# Patient Record
Sex: FEMALE
[De-identification: names, ages, dates, MRNs, and addresses within clinical notes are randomized per-mention and may not be internally consistent; named-entity substitution may affect disease eponyms.]

---

## 2018-04-08 ENCOUNTER — HOSPITAL ENCOUNTER (INPATIENT)
Dept: HOSPITAL 42 - ED | Age: 63
LOS: 2 days | Discharge: HOME | DRG: 738 | End: 2018-04-10
Attending: INTERNAL MEDICINE | Admitting: INTERNAL MEDICINE
Payer: MEDICARE

## 2018-04-08 DIAGNOSIS — E66.9: ICD-10-CM

## 2018-04-08 DIAGNOSIS — C56.2: Primary | ICD-10-CM

## 2018-04-08 DIAGNOSIS — E11.65: ICD-10-CM

## 2018-04-08 DIAGNOSIS — K80.20: ICD-10-CM

## 2018-04-08 DIAGNOSIS — H54.62: ICD-10-CM

## 2018-04-08 DIAGNOSIS — H81.09: ICD-10-CM

## 2018-04-08 DIAGNOSIS — Z88.0: ICD-10-CM

## 2018-04-08 DIAGNOSIS — I10: ICD-10-CM

## 2018-04-08 LAB
ALBUMIN SERPL-MCNC: 4.2 G/DL (ref 3–4.8)
ALBUMIN/GLOB SERPL: 1.1 {RATIO} (ref 1.1–1.8)
ALT SERPL-CCNC: 32 U/L (ref 7–56)
AMORPH SED URNS QL MICRO: (no result)
AMYLASE SERPL-CCNC: 60 U/L (ref 35–125)
APPEARANCE UR: CLEAR
APTT BLD: 26.9 SECONDS (ref 25.1–36.5)
AST SERPL-CCNC: 33 U/L (ref 14–36)
BACTERIA #/AREA URNS HPF: (no result) /[HPF]
BASE EXCESS BLDV CALC-SCNC: 0.6 MMOL/L (ref 0–2)
BASOPHILS # BLD AUTO: 0.02 K/MM3 (ref 0–2)
BASOPHILS NFR BLD: 0.1 % (ref 0–3)
BILIRUB UR-MCNC: NEGATIVE MG/DL
BUN SERPL-MCNC: 17 MG/DL (ref 7–21)
CALCIUM SERPL-MCNC: 10 MG/DL (ref 8.4–10.5)
COLOR UR: YELLOW
EOSINOPHIL # BLD: 0 10*3/UL (ref 0–0.7)
EOSINOPHIL NFR BLD: 0.2 % (ref 1.5–5)
ERYTHROCYTE [DISTWIDTH] IN BLOOD BY AUTOMATED COUNT: 13.3 % (ref 11.5–14.5)
GFR NON-AFRICAN AMERICAN: > 60
GLUCOSE UR STRIP-MCNC: NEGATIVE MG/DL
GRANULOCYTES # BLD: 17.48 10*3/UL (ref 1.4–6.5)
GRANULOCYTES NFR BLD: 90.9 % (ref 50–68)
HGB BLD-MCNC: 12.6 G/DL (ref 12–16)
INR PPP: 1.09 (ref 0.93–1.08)
LEUKOCYTE ESTERASE UR-ACNC: NEGATIVE LEU/UL
LIPASE SERPL-CCNC: 57 U/L (ref 23–300)
LYMPHOCYTE: 4 % (ref 22–35)
LYMPHOCYTES # BLD: 0.9 10*3/UL (ref 1.2–3.4)
LYMPHOCYTES NFR BLD AUTO: 4.5 % (ref 22–35)
MCH RBC QN AUTO: 30.7 PG (ref 25–35)
MCHC RBC AUTO-ENTMCNC: 33.9 G/DL (ref 31–37)
MCV RBC AUTO: 90.7 FL (ref 80–105)
MONOCYTE: 1 % (ref 1–6)
MONOCYTES # BLD AUTO: 0.8 10*3/UL (ref 0.1–0.6)
MONOCYTES NFR BLD: 4.3 % (ref 1–6)
NEUTROPHILS NFR BLD AUTO: 92 % (ref 50–70)
NEUTS BAND NFR BLD: 3 % (ref 0–2)
PH BLDV: 7.36 [PH] (ref 7.32–7.43)
PH UR STRIP: 5.5 [PH] (ref 4.7–8)
PLATELET # BLD EST: (no result) 10*3/UL
PLATELET # BLD: 450 10^3/UL (ref 120–450)
PMV BLD AUTO: 9.6 FL (ref 7–11)
PROT UR STRIP-MCNC: (no result) MG/DL
PROTHROMBIN TIME: 12.6 SECONDS (ref 9.4–12.5)
RBC # BLD AUTO: 4.1 10^6/UL (ref 3.5–6.1)
RBC # UR STRIP: NEGATIVE /UL
RBC #/AREA URNS HPF: (no result) /HPF (ref 0–2)
SP GR UR STRIP: >= 1.03 (ref 1–1.03)
TROPONIN I SERPL-MCNC: < 0.01 NG/ML
UROBILINOGEN UR STRIP-ACNC: 0.2 E.U./DL
VENOUS BLOOD FIO2: 21 %
VENOUS BLOOD GAS PCO2: 47 (ref 40–60)
VENOUS BLOOD GAS PO2: 23 MM/HG (ref 30–55)
WBC # BLD AUTO: 19.2 10^3/UL (ref 4.5–11)

## 2018-04-08 RX ADMIN — METRONIDAZOLE SCH MLS/HR: 500 INJECTION, SOLUTION INTRAVENOUS at 21:42

## 2018-04-08 NOTE — HP
DATE OF EXAM:  04/08/2018



HISTORY OF PRESENT ILLNESS:  Ms. Adams is a 62-year-old female presented

to the ED with left lower quadrant pain and lower abdominal pain.  She also

had nausea and vomiting.  CAT scan of the abdomen and pelvis done showed

large pelvic mass around 11 cm with cystic and solid component concerning

for ovarian cancer.  Denies any bleeding from any site.  No chest pain.  No

shortness of breath.  No weight loss.



PAST MEDICAL HISTORY:  Hypertension, Meniere disease, diabetes mellitus

type 2, history of peritonitis.



PAST SURGICAL HISTORY:  Rotator cuff surgery.



FAMILY HISTORY:  Noncontributory.



PERSONAL HISTORY:  Never smoked.  No history of alcohol abuse.



ALLERGIES:  PENICILLIN, TYLENOL, ASPIRIN, OXYCODONE.



REVIEW OF SYSTEMS:  As per HPI.  Rest of 12-point systems reviewed and

negative.



PHYSICAL EXAMINATION:

GENERAL:  Mild distress due to abdominal pain.

VITAL SIGNS:  Temperature 98, heart rate 94 per minute, respiratory rate 18

per minute, blood pressure 166/90, pulse ox is 98% on room air.

HEENT:  Normal.

NECK:  No lymphadenopathy.

CHEST:  Air entry present and equal bilateral.  No added sound.

CARDIOVASCULAR:  S1 and S2 normal.  No murmur.  No gallop.

ABDOMEN:  Soft, nontender.  No hepatosplenomegaly.

EXTREMITIES:  No edema.

ABDOMEN:  No rebound tenderness in lower abdomen.

SPINE:  Nontender.

SKIN:  No petechiae.  No rash.



LABORATORY DATA:  White count 19.2, hemoglobin 12.6, hematocrit 37.2,

platelet 450.  Sodium 138, potassium 4.1, BUN 17, creatinine 0.9, glucose

184.



ASSESSMENT:

1.  Pelvic mass.

     Concerning for ovarian cancer.

3.  Hypertension.

4.  Diabetes mellitus type 2.



PLAN:  Tumor marker C-125, CA 19-9 , CEA will be sent.  Ultrasound of the

abdomen.  If possible biopsy of the solid component for diagnosis.  If biopsy

 not possible because of  cystic components, Diagnostic ascitic tap can be

done for cytology for diagnosis.  We will consult Dr. Arya Plok.  Surgery

consultation with Dr. Mcgrath requested.  No evidence of acute abdomen on

CAT scan.  UA and urine culture pending.  Levaquin and IV Flagyl was

started.  IV fluid normal saline at 80 mL an hour.  Pain control with

morphine 1 mg q.4 hour p.r.n.

Gynec Oncology consultation will be arranged upon discharge form hospital. 





__________________________________________

Dang Matthews MD



DD:  04/08/2018 17:49:08

DT:  04/08/2018 17:54:31

[facility] # [id number]

TAHIR

## 2018-04-08 NOTE — CP.PCM.PN
Subjective





- Date & Time of Evaluation


Date of Evaluation: 04/08/18


Time of Evaluation: 21:50





- Subjective


Subjective: 


Patient was seen at bedside.


 She received morphine 2 mg IV which I ordered earlier for pain.


 She still has abdominal pain, lower abdominal pain , sharp pain, no radiation 

of pain.(21:50)


 Has no other complaints.


 Medical record was reviewed.


 This 62 year old white woman is admitted with LLQ abdominal pain of i week 

duration, nausea, vomiting, fever, chills,leukocytosis, free fluid


 and complex ovarian mass on CT scan.


 Has PMH of HTN, DM, obesity, peritonitis,left eye blindness, Meniere's disease

, right rotator cuff surgery, allergy to PCN, ASA, acetaminophen, 


 oxycodone.





Objective





- Vital Signs/Intake and Output


Vital Signs (last 24 hours): 


 











Temp Pulse Resp BP Pulse Ox


 


 98.0 F   90   18   120/83   97 


 


 04/08/18 15:04  04/08/18 16:12  04/08/18 16:12  04/08/18 16:12  04/08/18 16:12











- Medications


Medications: 


 Current Medications





Hydromorphone HCl (Dilaudid)  2 mg IVP STAT STA


   Stop: 04/08/18 21:50


Metronidazole (Flagyl)  500 mg in 100 mls @ 100 mls/hr IVPB Q8 SANDY


   PRN Reason: Protocol


   Last Admin: 04/08/18 21:42 Dose:  100 mls/hr


Sodium Chloride (Sodium Chloride 0.9%)  1,000 mls @ 80 mls/hr IV .[id number] SANDY


   Last Admin: 04/08/18 18:03 Dose:  80 mls/hr


Levofloxacin/Dextrose (Levaquin 750mg)  750 mg IVPB DAILY SANDY


   PRN Reason: Protocol


Morphine Sulfate (Morphine)  1 mg IVP Q4H PRN


   PRN Reason: Pain, moderate (4-7)


   Last Admin: 04/08/18 18:16 Dose:  1 mg











- Labs


Labs: 


 











PT  12.6 SECONDS (9.4-12.5)  H  04/08/18  12:20    


 


INR  1.09  (0.93-1.08)  H  04/08/18  12:20    


 


APTT  26.9 Seconds (25.1-36.5)   04/08/18  12:20    








 Most Recent Lab Values











WBC  19.2 10^3/ul (4.5-11.0)  H  04/08/18  12:20    


 


RBC  4.10 10^6/uL (3.5-6.1)   04/08/18  12:20    


 


Hgb  12.6 g/dL (12.0-16.0)   04/08/18  12:20    


 


Hct  37.2 % (36.0-48.0)   04/08/18  12:20    


 


MCV  90.7 fl (80.0-105.0)   04/08/18  12:20    


 


MCH  30.7 pg (25.0-35.0)   04/08/18  12:20    


 


MCHC  33.9 g/dl (31.0-37.0)   04/08/18  12:20    


 


RDW  13.3 % (11.5-14.5)   04/08/18  12:20    


 


Plt Count  450 10^3/uL (120.0-450.0)   04/08/18  12:20    


 


MPV  9.6 fl (7.0-11.0)   04/08/18  12:20    


 


Gran %  90.9 % (50.0-68.0)  H  04/08/18  12:20    


 


Lymph % (Auto)  4.5 % (22.0-35.0)  L  04/08/18  12:20    


 


Mono % (Auto)  4.3 % (1.0-6.0)   04/08/18  12:20    


 


Eos % (Auto)  0.2 % (1.5-5.0)  L  04/08/18  12:20    


 


Baso % (Auto)  0.1 % (0.0-3.0)   04/08/18  12:20    


 


Gran #  17.48  (1.4-6.5)  H  04/08/18  12:20    


 


Lymph # (Auto)  0.9  (1.2-3.4)  L  04/08/18  12:20    


 


Mono # (Auto)  0.8  (0.1-0.6)  H  04/08/18  12:20    


 


Eos # (Auto)  0.0  (0.0-0.7)   04/08/18  12:20    


 


Baso # (Auto)  0.02 K/mm3 (0.0-2.0)   04/08/18  12:20    


 


Neutrophils % (Manual)  92 % (50.0-70.0)  H  04/08/18  12:20    


 


Band Neutrophils %  3 % (0-2)  H  04/08/18  12:20    


 


Lymphocytes % (Manual)  4 % (22.0-35.0)  L  04/08/18  12:20    


 


Monocytes % (Manual)  1 % (1.0-6.0)   04/08/18  12:20    


 


Platelet Evaluation  High  (NORMAL)   04/08/18  12:20    


 


PT  12.6 SECONDS (9.4-12.5)  H  04/08/18  12:20    


 


INR  1.09  (0.93-1.08)  H  04/08/18  12:20    


 


APTT  26.9 Seconds (25.1-36.5)   04/08/18  12:20    


 


pO2  23 mm/Hg (30-55)  L  04/08/18  12:20    


 


VBG pH  7.36  (7.32-7.43)   04/08/18  12:20    


 


VBG pCO2  47.0  (40-60)   04/08/18  12:20    


 


VBG HCO3  26.6 mmol/l (21-28)   04/08/18  12:20    


 


VBG Total CO2  28.0 mmol.L (22-28)   04/08/18  12:20    


 


VBG O2 Sat (Calc)  45.1 % (40-65)   04/08/18  12:20    


 


VBG Base Excess  0.6 mmol/L (0.0-2.0)   04/08/18  12:20    


 


VBG Potassium  4.4 mmol/L (3.6-5.2)   04/08/18  12:20    


 


Sodium  135.0 mmol/L (132-148)   04/08/18  12:20    


 


Chloride  101.0 mmol/L ()   04/08/18  12:20    


 


Glucose  179 mg/dl ()  H  04/08/18  12:20    


 


Lactate  1.3 mmol/L (0.7-2.1)   04/08/18  12:20    


 


FiO2  21.0 %  04/08/18  12:20    


 


Sodium  138 mmol/L (132-148)   04/08/18  12:20    


 


Potassium  4.1 mmol/L (3.6-5.0)   04/08/18  12:20    


 


Chloride  101 mmol/L ()   04/08/18  12:20    


 


Carbon Dioxide  25 mmol/L (21-33)   04/08/18  12:20    


 


Anion Gap  16  (10-20)   04/08/18  12:20    


 


BUN  17 mg/dL (7-21)   04/08/18  12:20    


 


Creatinine  0.9 mg/dl (0.7-1.2)   04/08/18  12:20    


 


Est GFR ( Amer)  > 60   04/08/18  12:20    


 


Est GFR (Non-Af Amer)  > 60   04/08/18  12:20    


 


POC Glucose (mg/dL)  152 mg/dL ()  H  04/08/18  14:45    


 


Random Glucose  184 mg/dL ()  H  04/08/18  12:20    


 


Calcium  10.0 mg/dL (8.4-10.5)   04/08/18  12:20    


 


Phosphorus  3.4 mg/dL (2.5-4.5)   04/08/18  12:20    


 


Magnesium  1.8 mg/dL (1.7-2.2)   04/08/18  12:20    


 


Total Bilirubin  0.5 mg/dL (0.2-1.3)   04/08/18  12:20    


 


AST  33 U/L (14-36)   04/08/18  12:20    


 


ALT  32 U/L (7-56)   04/08/18  12:20    


 


Alkaline Phosphatase  85 U/L ()   04/08/18  12:20    


 


Lactate Dehydrogenase  799 U/L (333-699)  H  04/08/18  12:20    


 


Total Creatine Kinase  36 U/L ()   04/08/18  12:20    


 


Troponin I  < 0.01 ng/mL  04/08/18  12:20    


 


Total Protein  8.0 g/dL (5.8-8.3)   04/08/18  12:20    


 


Albumin  4.2 g/dL (3.0-4.8)   04/08/18  12:20    


 


Globulin  3.8 gm/dL  04/08/18  12:20    


 


Albumin/Globulin Ratio  1.1  (1.1-1.8)   04/08/18  12:20    


 


Amylase  60 U/L ()   04/08/18  12:20    


 


Lipase  57 U/L ()   04/08/18  12:20    


 


Venous Blood Potassium  4.4 mmol/L (3.6-5.2)   04/08/18  12:20    


 


Urine Color  Yellow  (YELLOW)   04/08/18  15:35    


 


Urine Appearance  Clear  (CLEAR)   04/08/18  15:35    


 


Urine pH  5.5  (4.7-8.0)   04/08/18  15:35    


 


Ur Specific Gravity  >= 1.030  (1.005-1.035)   04/08/18  15:35    


 


Urine Protein  Trace mg/dL (<30 mg/dL)  H  04/08/18  15:35    


 


Urine Glucose (UA)  Negative mg/dL (NEGATIVE)   04/08/18  15:35    


 


Urine Ketones  15 mg/dL (NEGATIVE)  H  04/08/18  15:35    


 


Urine Blood  Negative  (NEGATIVE)   04/08/18  15:35    


 


Urine Nitrate  Negative  (NEGATIVE)   04/08/18  15:35    


 


Urine Bilirubin  Negative  (NEGATIVE)   04/08/18  15:35    


 


Urine Urobilinogen  0.2 E.U./dL (<1 E.U./dL)   04/08/18  15:35    


 


Ur Leukocyte Esterase  Negative Dennis/uL (NEGATIVE)   04/08/18  15:35    


 


Urine RBC  0 - 2 /hpf (0-2)   04/08/18  15:35    


 


Urine WBC  1 - 3 /hpf (0-6)   04/08/18  15:35    


 


Ur Epithelial Cells  4 - 5 /hpf (0-5)   04/08/18  15:35    


 


Amorphous Sediment  Few   04/08/18  15:35    


 


Urine Bacteria  Many  (NEG)   04/08/18  15:35    


 


Urine Other  Uyeast   04/08/18  15:35    


 


Blood Type  AB POSITIVE   04/08/18  12:20    


 


Blood Type Confirm  AB POSITIVE   04/08/18  13:00    


 


Antibody Screen  Negative   04/08/18  12:20    


 


BBK History Checked  No verified bt   04/08/18  12:20    














- Constitutional


Appears: Well, No Acute Distress





- Head Exam


Head Exam: ATRAUMATIC, NORMAL INSPECTION, NORMOCEPHALIC





- Eye Exam


Eye Exam: Normal appearance





- ENT Exam


ENT Exam: Normal External Ear Exam





- Neck Exam


Neck Exam: Normal Inspection





- Respiratory Exam


Respiratory Exam: NORMAL BREATHING PATTERN





- Cardiovascular Exam


Cardiovascular Exam: absent: JVD





- GI/Abdominal Exam


GI & Abdominal Exam: Soft, Tenderness (Mild suprapubic and lower abdominal 

tenderness present.).  absent: Distended, Firm, Guarding, Rigid





- Rectal Exam


Rectal Exam: Deferred





-  Exam


Additional comments: 





Deferred.





- Extremities Exam


Extremities Exam: Normal Inspection





- Back Exam


Back Exam: NORMAL INSPECTION





- Neurological Exam


Neurological Exam: Alert, Awake, Oriented x3





- Psychiatric Exam


Psychiatric exam: Normal Affect, Normal Mood





- Skin


Skin Exam: Normal Color





Assessment and Plan





- Assessment and Plan (Free Text)


Assessment: 





Lower abdominal pain.


Ovarian mass.


Nausea/Vomiting.


Leukocytosis.


Hyperglycemia.


Fever/Chills.


Obesity.


DM II. 


HTN.





Plan: 





Morphine 2 mg IV given.


Dilaudid 2 mg IV given.


Continue present management.

## 2018-04-08 NOTE — CP.PCM.CON
History of Present Illness





- History of Present Illness


History of Present Illness: 





General Surgery:  Dr Mcgrath





Pt is a 62F with PMH of HTN and DM.  Pt presents to ED with 1 week of LLQ 

abdominal pain, which has acutely worsened over the past 24 hours.  Pt states 

it was a dull ache for most of the week until last night when it became a sharp 

pain, with associated nausea and vomiting.  Pt states she has been feeling 

feverish and has been having chills for the past 3 days.  Admits to having 

normal bowel movements throughout the past week.  Denies any sob, chest pain, 

hematemsis or hematochezia.  Pt has never had this pain before, though states 

30 years ago she was told she had "peritonitis".  





PMH: HTN. DM


PSH: denies


ALL: penicillins, aspirin





Review of Systems





- Review of Systems


All systems: reviewed and no additional remarkable complaints except (as per hpi

)





Past Patient History





- Past Social History


Smoking Status: Never Smoked





- CARDIAC


Hx Cardiac Disorders: Yes


Hx Hypertension: Yes





- PULMONARY


Hx Respiratory Disorders: No





- NEUROLOGICAL


Hx Neurological Disorder: No





- HEENT


Hx HEENT Problems: Yes


Hx Blind: Yes (left eye)


Other/Comment: Meniere's Disease





- RENAL


Hx Chronic Kidney Disease: No





- ENDOCRINE/METABOLIC


Hx Endocrine Disorders: Yes


Hx Diabetes Mellitus Type 2: Yes





- HEMATOLOGICAL/ONCOLOGICAL


Hx Blood Disorders: No





- INTEGUMENTARY


Hx Dermatological Problems: No





- MUSCULOSKELETAL/RHEUMATOLOGICAL


Hx Musculoskeletal Disorders: No





- GASTROINTESTINAL


Hx Gastrointestinal Disorders: Yes


Other/Comment: Peritonitis





- GENITOURINARY/GYNECOLOGICAL


Hx Genitourinary Disorders: No





- PSYCHIATRIC


Hx Psychophysiologic Disorder: No


Hx Substance Use: No





- SURGICAL HISTORY


Hx Orthopedic Surgery: Yes (right rotator cuff)





Meds


Allergies/Adverse Reactions: 


 Allergies











Allergy/AdvReac Type Severity Reaction Status Date / Time


 


Penicillins Allergy  URTICARIA Verified 04/08/18 12:44


 


acetaminophen [From Percocet] AdvReac  DIZZINESS Verified 04/08/18 12:45


 


aspirin AdvReac  PAIN Verified 04/08/18 12:45


 


oxycodone AdvReac  DIZZINESS Verified 04/08/18 12:45














Physical Exam





- Constitutional


Appears: In Acute Distress





- Head Exam


Head Exam: NORMOCEPHALIC





- Eye Exam


Eye Exam: Normal appearance





- ENT Exam


ENT Exam: Mucous Membranes Dry





- Respiratory Exam


Respiratory Exam: absent: Accessory Muscle Use, Respiratory Distress





- Cardiovascular Exam


Cardiovascular Exam: Tachycardia, REGULAR RHYTHM





- GI/Abdominal Exam


GI & Abdominal Exam: Firm, Guarding (voluntary), Tenderness (worse in LLQ).  

absent: Distended, Hernia, Mass, Pulsatile Mass, Rebound, Rigid





- Extremities Exam


Extremities exam: Negative for: pedal edema





- Neurological Exam


Neurological exam: Alert, Oriented x3





- Psychiatric Exam


Psychiatric exam: Anxious, Normal Affect





- Skin


Skin Exam: Normal Color, Warm





Results





- Vital Signs


Recent Vital Signs: 


 Last Vital Signs











Temp  98.0 F   04/08/18 14:41


 


Pulse  94 H  04/08/18 14:41


 


Resp  18   04/08/18 14:41


 


BP  166/91 H  04/08/18 14:41


 


Pulse Ox  95   04/08/18 14:41














- Labs


Result Diagrams: 


 04/08/18 12:20





 04/08/18 12:20


Labs: 


 Laboratory Results - last 24 hr











  04/08/18





  14:45


 


POC Glucose (mg/dL)  152 H














Assessment & Plan





- Assessment and Plan (Free Text)


Assessment: 





62F with 1 week of abdominal pain; CT shows free fluid and complex ovarian mass


Plan: 





symptoms likely related to ovarian pathology and not diverticulitis


would appropriately resuscitate and initiate IV abx


consider Gyn consult/work-up








will d/w Dr Mcgrath


further recs to follow





Maryam, PGY3

## 2018-04-08 NOTE — ED PDOC
Arrival/HPI





- General


Chief Complaint: GI Problem


Time Seen by Provider: 04/08/18 12:07


Historian: Patient





- History of Present Illness


Narrative History of Present Illness (Text): 


04/08/18 12:28


62 year old female, whose past medical history includes diabetes and 

hypertension, who presents to the emergency department complaining of abdominal 

pain for approximately one week. Patient states pain started gradually in left 

lower abdomen and has been constant and progressively worse. Also reports 

sensation of icnreased distension.  States she has pain with urination, but 

denies discoloration of urine, denies retension. Patient notes associated 

vomiting and nausea. Reports normal bowel movements, denies bloody or dark 

stool. States she has chills in the middle of the night for the "past few days".


04/08/18 12:54





Symptom Onset: Sudden


Symptom Course: Unchanged


Activities at Onset: Light


Context: Home





Past Medical History





- Provider Review


Nursing Documentation Reviewed: Yes





- Cardiac


Hx Cardiac Disorders: Yes


Hx Hypertension: Yes





- Pulmonary


Hx Respiratory Disorders: No





- Neurological


Hx Neurological Disorder: No





- HEENT


Hx HEENT Disorder: Yes


Hx Blind: Yes (left eye)


Other/Comment: Meniere's Disease





- Renal


Hx Renal Disorder: No





- Endocrine/Metabolic


Hx Endocrine Disorders: Yes


Hx Diabetes Mellitus Type 2: Yes





- Hematological/Oncological


Hx Blood Disorders: No





- Integumentary


Hx Dermatological Disorder: No





- Musculoskeletal/Rheumatological


Hx Musculoskeletal Disorders: No





- Gastrointestinal


Hx Gastrointestinal Disorders: Yes


Other/Comment: Peritonitis





- Genitourinary/Gynecological


Hx Genitourinary Disorders: No





- Psychiatric


Hx Psychophysiologic Disorder: No


Hx Substance Use: No





- Surgical History


Hx Orthopedic Surgery: Yes (right rotator cuff)





Family/Social History





- Physician Review


Nursing Documentation Reviewed: Yes


Family/Social History: Unknown Family HX


Smoking Status: Never Smoked


Hx Alcohol Use: No


Hx Substance Use: No





Allergies/Home Meds


Allergies/Adverse Reactions: 


Allergies





Penicillins Allergy (Verified 04/08/18 12:44)


 URTICARIA


acetaminophen [From Percocet] Adverse Reaction (Verified 04/08/18 12:45)


 DIZZINESS


aspirin Adverse Reaction (Verified 04/08/18 12:45)


 PAIN


oxycodone Adverse Reaction (Verified 04/08/18 12:45)


 DIZZINESS











Review of Systems





- Review of Systems


Constitutional: Fatigue, Fevers, Night Sweats


Eyes: absent: Vision Changes


ENT: absent: Hearing Changes, Sore Throat


Respiratory: SOB.  absent: Cough


Cardiovascular: absent: Chest Pain, Palpitations, Edema, Calf Pain, RIGGINS


Gastrointestinal: Abdominal Pain, Nausea, Vomiting, Appetite Changes, Anorexia


Genitourinary Female: Dysuria.  absent: Frequency, Vaginal Bleeding, Vaginal 

Discharge


Musculoskeletal: absent: Back Pain, Neck Pain


Skin: absent: Rash


Neurological: absent: Headache, Dizziness, Focal Weakness


Endocrine: absent: Polyuria


Hemo/Lymphatic: absent: Easy Bleeding


Psychiatric: absent: Depression





Physical Exam





- Physical Exam


Narrative Physical Exam (Text): 


04/08/18 12:32


Head:  Atraumatic.  Normocephalic. 


Eyes:  Left eye at baseline from prior disease. No pain with eye movements.


ENT:  Mucous membranes dry.  Oropharynx is clear and symmetric. 


Neck:  Supple.  Full ROM.  No JVD.  No lymphadenopathy. No meningeal signs.


Cardiovascular:  Tachycardiac.  No murmurs, rubs, or gallops.  Distal pulses 

are 2+ and symmetric.


Pulmonary/Chest:  Slightly tachypneic. Clear to auscultation bilaterally.  No 

wheezing, rales or rhonchi.


Abdominal:  Distended.  Rebound and guarding to the left lower quadrant.  No 

pulsatile masses. Strong femoral pulses. No incarcerated hernias palpated.


Back:  No CVA tenderness.


Extremities:  No edema.   No cyanosis.  No clubbing.  Full range of motion in 

all extremities.  No calf tenderness.


Skin:  Skin is warm and dry.  No petechiae.  No purpura. 


Neurological:  Alert, awake, and oriented to person, place, time, and 

situation.  Normal speech. Motor and sensory exam intact.


Psychiatric:  Good eye contact.  Normal interaction, affect, and behavior.











Vital Signs Reviewed: Yes


Vital Signs











  Temp Pulse Resp BP Pulse Ox


 


 04/08/18 16:12   90  18  120/83  97


 


 04/08/18 15:04  98.0 F  94 H  18  166/91 H 


 


 04/08/18 14:41  98.0 F  94 H  18  166/91 H  95


 


 04/08/18 13:58   83  18  144/78  99


 


 04/08/18 13:25   96 H  18  158/90 H  98


 


 04/08/18 13:18   92 H  18  167/92 H  99


 


 04/08/18 12:55   78  18  170/90 H  99


 


 04/08/18 12:40  99 F  90  18  165/92 H  99


 


 04/08/18 12:19   126 H  18  160/122 H  100











Temperature: Afebrile


Blood Pressure: Hypertensive


Pulse: Tachycardic


Respiratory Rate: Tachypneic


Appearance: Positive for: Ill-Appearing, Uncomfortable


Pain Distress: Severe


Mental Status: Positive for: Alert and Oriented X 3





Medical Decision Making


ED Course and Treatment: 


04/08/18 12:34


Impression:


62 year old female who presents to the emergency department complaining of left 

lower abdominal pain for 1 week. 





Differential Diagnosis included but are not limited to:  Diverticulitis vs. 

perforation vs. peritonitis vs. bowel obstruction vs. renal colic vs. 

pyleonephritis vs. uti vs. sepsis





Plan:


-- IV Hydration


-- CT Abdomen


-- IV Abx


-- Surgery Consult


-- Reassess and disposition





Progress Notes:


04/08/18 12:20


Case discussed with surgical resident, who is aware and agrees with plan. 





04/08/18 12:39


After lengthy discussion with patient, pt states percocet and oxycodon give her 

"stroke like symptoms". Pt also allergic to Penicillin and aspirin and tylenol. 

Due to these allergies, pt will be started on Levaquin and flagyl for suspicion 

of intraabdominal infection, and will have lengthy discussion regarding pain 

medication alternatives for patient with risks/benefits. 





Surgical resident has performed initial evaluation in the Emergency Department.


Dr. Matthews, covering for PMD, has also performed initial assessment and requests 

Dr. Mcgrath for surgical consult, who I have communicated exam and history with.


Labs and imaging studies pending.





04/08/18 13:01


Based on CT findings, consult was placed with on call gynecologist, Dr. Mary. 








04/08/18 15:08


Patient DENIES allergy to acetaminophen. Tylenol ordered. She reports allergy 

to Percocet by has taken tylenol without adverse effect.


I reviewed CT findings with patient.


CT suggests possible pelvic mass with ascites. Given leukocytosis I cannot 

exclude infectious component as well although no free air or abscess currently 

noted. She is afebrile. NOT hypotensive. Tachycardia improved.


04/08/18 15:13


Dr Matthews states she will consult gynecolgy oncolgist, requests cancelling 

current gynecology consult until further assessment.


Pain management and allergies reviewed with patient and admitting team.








- Lab Interpretations


Lab Results: 








 04/08/18 12:20 





 04/08/18 12:20 





 Lab Results





04/08/18 13:00: Blood Type Confirm AB POSITIVE


04/08/18 12:20: Blood Type AB POSITIVE, Antibody Screen Negative, BBK History 

Checked No verified bt


04/08/18 12:20: Sodium 138, Chloride 101, Potassium 4.1, Carbon Dioxide 25, 

Anion Gap 16, BUN 17, Creatinine 0.9, Est GFR (African Amer) > 60, Est GFR (Non-

Af Amer) > 60, Random Glucose 184 H, Calcium 10.0, Phosphorus 3.4, Magnesium 1.8

, Total Bilirubin 0.5, AST 33, ALT 32, Alkaline Phosphatase 85, Lactate 

Dehydrogenase 799 H, Total Creatine Kinase 36, Troponin I < 0.01, Total Protein 

8.0, Albumin 4.2, Globulin 3.8, Albumin/Globulin Ratio 1.1, Amylase 60, Lipase 

57


04/08/18 12:20: pO2 23 L, VBG pH 7.36, VBG pCO2 47.0, VBG HCO3 26.6, VBG Total 

CO2 28.0, VBG O2 Sat (Calc) 45.1, VBG Base Excess 0.6, VBG Potassium 4.4, 

Sodium 135.0, Chloride 101.0, Glucose 179 H, Lactate 1.3, FiO2 21.0, Venous 

Blood Potassium 4.4


04/08/18 12:20: PT 12.6 H, INR 1.09 H, APTT 26.9


04/08/18 12:20: WBC 19.2 H, RBC 4.10, Hgb 12.6, Hct 37.2, MCV 90.7, MCH 30.7, 

MCHC 33.9, RDW 13.3, Plt Count 450, MPV 9.6, Gran % 90.9 H, Lymph % (Auto) 4.5 L

, Mono % (Auto) 4.3, Eos % (Auto) 0.2 L, Baso % (Auto) 0.1, Gran # 17.48 H, 

Lymph # (Auto) 0.9 L, Mono # (Auto) 0.8 H, Eos # (Auto) 0.0, Baso # (Auto) 0.02

, Neutrophils % (Manual) 92 H, Band Neutrophils % 3 H, Lymphocytes % (Manual) 4 

L, Monocytes % (Manual) 1, Platelet Evaluation High











- RAD Interpretation


Radiology Orders: 








04/08/18 12:25


ABD & PELVIS W/O PO OR IV CONT [CT] Stat 





04/08/18 12:26


CHEST PORTABLE [RAD] Stat 





04/08/18 12:31


ABDOMEN PORTABLE 1 VIEW [RAD] Stat 











: Radiologist





- EKG Interpretation


Interpreted by ED Physician: Yes


Type: 12 lead EKG





- Medication Orders


Current Medication Orders: 








Metronidazole (Flagyl)  500 mg in 100 mls @ 100 mls/hr IVPB Q8 SANDY


   PRN Reason: Protocol


   Last Admin: 04/08/18 21:42  Dose: 100 mls/hr





eMAR Start Stop


 Document     04/08/18 21:42  GIOVANNA  (Rec: 04/08/18 21:43  GIOVANNA SOSAKOSTENDORFLP)


     Intravenous Solution


      Start Date                                 04/08/18


      Start Time                                 21:42


      End Date                                   04/08/18


      End time                                   22:42


      Total Infusion Time                        60





Sodium Chloride (Sodium Chloride 0.9%)  1,000 mls @ 80 mls/hr IV .[id number] [facility]


   Last Admin: 04/08/18 18:03  Dose: 80 mls/hr





eMAR Start Stop


 Document     04/08/18 18:03  SOPHIE  (Rec: 04/08/18 18:12  SOPHIE MIRANDASTENDORFLP)


     Intravenous Solution


      Start Date                                 04/08/18


      Start Time                                 18:12





Levofloxacin/Dextrose (Levaquin 750mg)  750 mg IVPB DAILY SANDY


   PRN Reason: Protocol


Morphine Sulfate (Morphine)  1 mg IVP Q4H PRN


   PRN Reason: Pain, moderate (4-7)


   Last Admin: 04/08/18 18:16  Dose: 1 mg





MAR Pain Assessment


 Document     04/08/18 18:16  SOPHIE  (Rec: 04/08/18 18:16  SOPHIE SOSAKOSTENDORFLP)


     Pain Reassessment


      Is this a pain reassessment?               Yes


     Sleep


      Is patient sleeping during reassessment?   No


     Presence of Pain


      Presence of Pain                           Yes


     Pain Scale Used


      Pain Scale Used                            Numeric


     Location


      Left, Right or Bilateral                   Left


      Upper or Lower                             Lower


      Pain Location Body Site                    Abdomen


     Description


      Description                                Intermittent


      Intensity of Pain at present               10


      Pain Behavior                              Moaning


      Aggravating Factors                        ADL's


                                                 Changing Position


      Alleviating Factors/Management             Medication


       Techniques                                


      Alleviating Factors                        Medication


IVP Administration


 Document     04/08/18 18:16  SOPHIE  (Rec: 04/08/18 18:16  SOPHIE SOSAKOSTENDORFLP)


     Charges for Administration


      # of IVP Administrations                   1


Re-Assess: MAR Pain Assessment


 Document     04/08/18 19:16  SOPHIE  (Rec: 04/08/18 19:44  SOPHIE  [id number])


     Pain Reassessment


      Is this a pain reassessment?               Yes


     Sleep


      Is patient sleeping during reassessment?   No


     Presence of Pain


      Presence of Pain                           No





Pneumococcal Polyvalent Vaccine (Pneumovax 23 Vaccine)  0.5 ml IM .ONCE ONE


   Stop: 04/08/18 23:19





Discontinued Medications





Acetaminophen (Tylenol 325mg Tab)  650 mg PO ONCE STA


   Stop: 04/08/18 14:51


   Last Admin: 04/08/18 15:26  Dose: 650 mg





MAR Pain/Vitals


 Document     04/08/18 15:26  FRANCIS  (Rec: 04/08/18 15:26  [id number]


     Pain Reassessment


      Is This A Pain ReAssessment?               No


     Sleep


      Is patient sleeping during reassessment?   No


     Presence of Pain


      Presence of Pain                           Yes





Hydromorphone HCl (Dilaudid)  0.5 mg IVP Q3H PRN


   PRN Reason: Pain, moderate (4-7)


Hydromorphone HCl (Dilaudid)  2 mg IVP STAT STA


   Stop: 04/08/18 21:50


   Last Admin: 04/08/18 22:10  Dose: 2 mg





MAR Pain Assessment


 Document     04/08/18 22:10  GIOVANNA  (Rec: 04/08/18 22:10  GIOVANNA  

[facility]


     Pain Reassessment


      Is this a pain reassessment?               Yes


     Presence of Pain


      Presence of Pain                           Yes


     Pain Scale Used


      Pain Scale Used                            Numeric


     Location


      Pain Location Body Site                    Abdomen


     Description


      Description                                Constant


      Intensity of Pain at present               8


IVP Administration


 Document     04/08/18 22:10  GIOVANNA  (Rec: 04/08/18 22:10  GIOVANNA  

[facility]


     Charges for Administration


      # of IVP Administrations                   1


Re-Assess: MAR Pain Assessment


 Document     04/08/18 23:10  GIOVANNA  (Rec: 04/08/18 23:11  GIOVANNA  [id number])


     Pain Reassessment


      Is this a pain reassessment?               Yes


     Sleep


      Is patient sleeping during reassessment?   Yes





Lactated Ringer's 3,030 ml/ IV (SUPPLIES)  3,030 mls @ 6,069.06 mls/hr IV ONCE 

ONE


   PRN Reason: 60 ML/KG/HR


   Stop: 04/08/18 12:29


   Last Admin: 04/08/18 12:59  Dose: 6,069.06 mls/hr





eMAR Start Stop


 Document     04/08/18 12:59  FRANCIS  (Rec: 04/08/18 13:00  FRANCIS  [id number])


     Intravenous Solution


      Start Date                                 04/08/18


      Start Time                                 13:00


      End Date                                   04/08/18


      End time                                   13:30


      Total Infusion Time                        30





Metronidazole (Flagyl)  500 mg in 100 mls @ 100 mls/hr IVPB STAT STA


   PRN Reason: Protocol


   Stop: 04/08/18 13:36


   Last Admin: 04/08/18 13:00  Dose: 100 mls/hr





eMAR Start Stop


 Document     04/08/18 13:00  FRANCIS  (Rec: 04/08/18 13:01  FRANCIS  [id number])


     Intravenous Solution


      Start Date                                 04/08/18


      Start Time                                 13:01


      End Date                                   04/08/18


      End time                                   13:31


      Total Infusion Time                        30





Levofloxacin/Dextrose (Levaquin 750mg)  750 mg in 150 mls @ 100 mls/hr IVPB 

STAT STA


   PRN Reason: Protocol


   Stop: 04/08/18 14:06


   Last Admin: 04/08/18 13:31  Dose: 100 mls/hr





eMAR Start Stop


 Document     04/08/18 13:31  FRANCIS  (Rec: 04/08/18 13:31  FRANCIS  [id number])


     Intravenous Solution


      Start Date                                 04/08/18


      Start Time                                 13:31


      End Date                                   04/08/18


      End time                                   15:01


      Total Infusion Time                        90





Lactated Ringer's (Lactated Ringer's)  1,000 mls @ 150 mls/hr IV .[id number] [facility]


   Last Admin: 04/08/18 15:26  Dose: 150 mls/hr





eMAR Start Stop


 Document     04/08/18 15:26  FRANCIS  (Rec: 04/08/18 15:26  FRANCIS  [id number])


     Intravenous Solution


      Start Date                                 04/08/18


      Start Time                                 15:26





Morphine Sulfate (Morphine)  2 mg IVP STAT STA


   Stop: 04/08/18 20:28


   Last Admin: 04/08/18 20:36  Dose: 2 mg





MAR Pain Assessment


 Document     04/08/18 20:36  OLIVD  (Rec: 04/08/18 20:36  OLIVD  

BMCKOSTENDORFLP)


     Pain Reassessment


      Is this a pain reassessment?               No


     Presence of Pain


      Presence of Pain                           Yes


     Pain Scale Used


      Pain Scale Used                            Numeric


     Location


      Pain Location Body Site                    Abdomen


     Description


      Description                                Constant


      Intensity of Pain at present               9


      Pain Behavior                              Moaning


                                                 Restlessness


IVP Administration


 Document     04/08/18 20:36  OLIVD  (Rec: 04/08/18 20:36  OLIVD  

BMCKOSTENDORFLP)


     Charges for Administration


      # of IVP Administrations                   1


Re-Assess: MAR Pain Assessment


 Document     04/08/18 21:36  OLIVD  (Rec: 04/08/18 21:42  OLIVD  

BMCKOSTENDORFLP)


     Pain Reassessment


      Is this a pain reassessment?               Yes


     Sleep


      Is patient sleeping during reassessment?   Yes














- Scribe Statement


The provider has reviewed the documentation as recorded by the Scribe


Karie Montoya





All medical record entries made by the Scribe were at my direction and 

personally dictated by me. I have reviewed the chart and agree that the record 

accurately reflects my personal performance of the history, physical exam, 

medical decision making, and the department course for this patient. I have 

also personally directed, reviewed, and agree with the discharge instructions 

and disposition.








Disposition/Present on Arrival





- Present on Arrival


Any Indicators Present on Arrival: No


History of DVT/PE: No


History of Uncontrolled Diabetes: No


Urinary Catheter: No


History of Decub. Ulcer: No


History Surgical Site Infection Following: None





- Disposition


Have Diagnosis and Disposition been Completed?: Yes


Diagnosis: 


 Abdominal pain, Abdominal mass





Disposition: HOSPITALIZED


Disposition Time: 14:45


Patient Plan: Admission, Telemetry


Patient Problems: 


 Current Active Problems











Problem Status Onset


 


Abdominal mass Acute  


 


Abdominal pain Acute  











Condition: SERIOUS

## 2018-04-08 NOTE — CT
PROCEDURE:  CT Abdomen and Pelvis without intravenous contrast



HISTORY:

llq abdominal pain, eval for free air



COMPARISON:

None.



TECHNIQUE:

Without contrast. 



Contrast Dose: 



Radiation dose: 



Total exam DLP = 



Total exam DLP = 1009 mGy-cm.



This CT exam was performed using one or more of the following dose 

reduction techniques: Automated exposure control, adjustment of the 

mA and/or kV according to patient size, and/or use of iterative 

reconstruction technique.



FINDINGS:



LOWER THORAX:

Unremarkable. 



LIVER:

Unremarkable. No gross lesion or ductal dilatation.  



GALLBLADDER AND BILE DUCTS:

Large gallstones 



PANCREAS:

Unremarkable. No gross lesion or ductal dilatation.



SPLEEN:

Unremarkable. 



ADRENALS:

Unremarkable. No mass. 



KIDNEYS AND URETERS:

Unremarkable. No hydronephrosis. No solid mass. 



VASCULATURE:

Unremarkable. No aortic aneurysm. 



BOWEL:

There is mild diverticulosis of the descending and sigmoid colon. 

There is no definite evidence of diverticulitis. There is no free air 



APPENDIX:

Unremarkable. Normal appendix. 



PERITONEUM:

There is a small amount of ascites around the liver and spleen and in 

the left pericolic gutter 



LYMPH NODES:

Unremarkable. No enlarged lymph nodes. 



BLADDER:

Unremarkable. 



REPRODUCTIVE:

There is a complex solid and cystic mass in the pelvis that is 

suspicious for an ovarian malignancy. This measures 11.7 cm AP x 11.3 

cm in height by 8.6 cm wide. There is also a small amount of ascites 



BONES:

No acute fracture. 



OTHER FINDINGS:

Findings were discussed with Dr. Kimbrough at 2 p.m.



IMPRESSION:

Complex solid and cystic mass in the pelvis with associated ascites. 

The findings are suspicious for an ovarian malignancy.



Large gallstones.



No evidence of abscess or free air.

## 2018-04-09 LAB
ALBUMIN SERPL-MCNC: 3.5 G/DL (ref 3–4.8)
ALBUMIN/GLOB SERPL: 1 {RATIO} (ref 1.1–1.8)
ALT SERPL-CCNC: 21 U/L (ref 7–56)
AST SERPL-CCNC: 37 U/L (ref 14–36)
BASOPHILS # BLD AUTO: 0.01 K/MM3 (ref 0–2)
BASOPHILS NFR BLD: 0.1 % (ref 0–3)
BUN SERPL-MCNC: 23 MG/DL (ref 7–21)
CALCIUM SERPL-MCNC: 9.5 MG/DL (ref 8.4–10.5)
EOSINOPHIL # BLD: 0 10*3/UL (ref 0–0.7)
EOSINOPHIL NFR BLD: 0 % (ref 1.5–5)
ERYTHROCYTE [DISTWIDTH] IN BLOOD BY AUTOMATED COUNT: 13.6 % (ref 11.5–14.5)
GFR NON-AFRICAN AMERICAN: > 60
GRANULOCYTES # BLD: 13.12 10*3/UL (ref 1.4–6.5)
GRANULOCYTES NFR BLD: 90.9 % (ref 50–68)
HGB BLD-MCNC: 10.3 G/DL (ref 12–16)
LYMPHOCYTES # BLD: 0.6 10*3/UL (ref 1.2–3.4)
LYMPHOCYTES NFR BLD AUTO: 3.8 % (ref 22–35)
MCH RBC QN AUTO: 29.4 PG (ref 25–35)
MCHC RBC AUTO-ENTMCNC: 32.3 G/DL (ref 31–37)
MCV RBC AUTO: 91.1 FL (ref 80–105)
MONOCYTES # BLD AUTO: 0.8 10*3/UL (ref 0.1–0.6)
MONOCYTES NFR BLD: 5.2 % (ref 1–6)
PLATELET # BLD: 367 10^3/UL (ref 120–450)
PMV BLD AUTO: 9.5 FL (ref 7–11)
RBC # BLD AUTO: 3.5 10^6/UL (ref 3.5–6.1)
WBC # BLD AUTO: 14.4 10^3/UL (ref 4.5–11)

## 2018-04-09 PROCEDURE — 0UB13ZX EXCISION OF LEFT OVARY, PERCUTANEOUS APPROACH, DIAGNOSTIC: ICD-10-PCS | Performed by: RADIOLOGY

## 2018-04-09 RX ADMIN — LEVOFLOXACIN SCH MG: 5 INJECTION, SOLUTION INTRAVENOUS at 10:13

## 2018-04-09 RX ADMIN — HYDROMORPHONE HYDROCHLORIDE PRN MG: 1 INJECTION, SOLUTION INTRAMUSCULAR; INTRAVENOUS; SUBCUTANEOUS at 12:02

## 2018-04-09 RX ADMIN — INSULIN HUMAN SCH: 100 INJECTION, SOLUTION PARENTERAL at 12:05

## 2018-04-09 RX ADMIN — INSULIN HUMAN SCH UNITS: 100 INJECTION, SOLUTION PARENTERAL at 21:44

## 2018-04-09 RX ADMIN — METRONIDAZOLE SCH MLS/HR: 500 INJECTION, SOLUTION INTRAVENOUS at 05:08

## 2018-04-09 RX ADMIN — INSULIN HUMAN SCH UNITS: 100 INJECTION, SOLUTION PARENTERAL at 17:42

## 2018-04-09 RX ADMIN — METRONIDAZOLE SCH MLS/HR: 500 INJECTION, SOLUTION INTRAVENOUS at 21:44

## 2018-04-09 RX ADMIN — METRONIDAZOLE SCH: 500 INJECTION, SOLUTION INTRAVENOUS at 14:00

## 2018-04-09 NOTE — RAD
HISTORY:

eval for free air  



COMPARISON:

No prior.



FINDINGS:



BOWEL:

Normal. No obstruction. No free air. 



BONES:

Normal.



OTHER FINDINGS:

None.



IMPRESSION:

No active disease.

## 2018-04-09 NOTE — CARD
--------------- APPROVED REPORT --------------





EKG Measurement

Heart [id number]

IA [id number]

[id number]

[id number]

[id number]



<Conclusion>

Sinus tachycardia

Moderate voltage criteria for LVH, may be normal variant

Borderline ECG

## 2018-04-09 NOTE — CP.PCM.PN
Subjective





- Date & Time of Evaluation


Date of Evaluation: 04/09/18


Time of Evaluation: 07:35





- Subjective


Subjective: 





Surgery: Dr. Mcgrath 





Patient complains of pain in the abdomen. She reports nausea throughout the 

night. She denies bowel movement. 





Objective





- Vital Signs/Intake and Output


Vital Signs (last 24 hours): 


 











Temp Pulse Resp BP Pulse Ox


 


 98.0 F   84   18   120/83   97 


 


 04/08/18 15:04  04/09/18 05:41  04/08/18 16:12  04/08/18 16:12  04/08/18 16:12








Intake and Output: 


 











 04/09/18 04/09/18





 06:59 18:59


 


Intake Total 1920 


 


Output Total 200 


 


Balance 1720 














- Medications


Medications: 


 Current Medications





Metronidazole (Flagyl)  500 mg in 100 mls @ 100 mls/hr IVPB Q8 SANDY


   PRN Reason: Protocol


   Last Admin: 04/09/18 05:08 Dose:  100 mls/hr


Sodium Chloride (Sodium Chloride 0.9%)  1,000 mls @ 80 mls/hr IV .[id number] [facility]


   Last Admin: 04/08/18 18:03 Dose:  80 mls/hr


Levofloxacin/Dextrose (Levaquin 750mg)  750 mg IVPB DAILY SANDY


   PRN Reason: Protocol


Morphine Sulfate (Morphine)  1 mg IVP Q4H PRN


   PRN Reason: Pain, moderate (4-7)


   Last Admin: 04/08/18 18:16 Dose:  1 mg











- Labs


Labs: 


 





 04/09/18 05:45 





 











PT  12.6 SECONDS (9.4-12.5)  H  04/08/18  12:20    


 


INR  1.09  (0.93-1.08)  H  04/08/18  12:20    


 


APTT  26.9 Seconds (25.1-36.5)   04/08/18  12:20    














- Constitutional


Appears: Non-toxic, No Acute Distress





- Head Exam


Head Exam: ATRAUMATIC, NORMOCEPHALIC





- Eye Exam


Eye Exam: EOMI, Normal appearance





- ENT Exam


ENT Exam: Mucous Membranes Moist





- Respiratory Exam


Respiratory Exam: NORMAL BREATHING PATTERN.  absent: Respiratory Distress





- Cardiovascular Exam


Cardiovascular Exam: REGULAR RHYTHM.  absent: Tachycardia





- GI/Abdominal Exam


GI & Abdominal Exam: Distended, Soft, Tenderness (Right and left lower quadrant 

), Mass.  absent: Guarding





- Extremities Exam


Extremities Exam: Normal Inspection.  absent: Calf Tenderness





- Neurological Exam


Neurological Exam: Alert, Awake





- Skin


Skin Exam: Dry, Warm





Assessment and Plan





- Assessment and Plan (Free Text)


Assessment: 





61 y/o female w/ abdominal pain most likely 2/2 pelvic mass 








Plan: 





-f/u 


-fu IR evaluation 


-pain control 


-nausea control 


-no acute general surgical intervention at this time 





AKWhite PGY3

## 2018-04-09 NOTE — CP.PCM.PN
Subjective





- Date & Time of Evaluation


Date of Evaluation: 04/09/18


Time of Evaluation: 18:00





- Subjective


Subjective: 





Complaining of abdominal pain at biopsy site. She underwent CT guided biopsy of 

pelvic mass. 


left lower quadrant abdominal pain resolved. 


c/o nausea. No fever.  slightly elevated. 





Objective





- Vital Signs/Intake and Output


Vital Signs (last 24 hours): 


 











Temp Pulse Resp BP Pulse Ox


 


 98.5 F   95 H  19   118/73   92 L


 


 04/09/18 16:01  04/09/18 16:01  04/09/18 16:01  04/09/18 16:01  04/09/18 16:01








Intake and Output: 


 











 04/09/18 04/10/18





 18:59 06:59


 


Intake Total 75 


 


Balance 75 














- Medications


Medications: 


 Current Medications





Hydromorphone HCl (Dilaudid)  1 mg IVP Q4H PRN


   PRN Reason: Pain, severe (8-10)


   Last Admin: 04/09/18 12:02 Dose:  1 mg


Sodium Chloride (Sodium Chloride 0.9%)  1,000 mls @ 80 mls/hr IV .[id number] [facility]


   Last Admin: 04/08/18 18:03 Dose:  80 mls/hr


Metronidazole (Flagyl)  500 mg in 100 mls @ 100 mls/hr IVPB Q8 SANDY


   PRN Reason: Protocol


   Last Admin: 04/09/18 21:44 Dose:  100 mls/hr


Insulin Human Regular (Humulin R Low)  0 units SC ACHS SANDY


   PRN Reason: Protocol


   Last Admin: 04/09/18 21:44 Dose:  2 units


Levofloxacin/Dextrose (Levaquin 750mg)  750 mg IVPB DAILY [facility]


   PRN Reason: Protocol


   Last Admin: 04/09/18 10:13 Dose:  750 mg


Morphine Sulfate (Morphine)  1 mg IVP Q4H PRN


   PRN Reason: Pain, moderate (4-7)


Ondansetron HCl (Zofran Inj)  4 mg IVP Q8H [facility]


   Last Admin: 04/09/18 21:45 Dose:  4 mg


Ondansetron HCl (Zofran Inj)  4 mg IVP Q8H PRN


   PRN Reason: Nausea/Vomiting











- Labs


Labs: 


 





 04/09/18 05:45 





 04/09/18 05:45 





 











PT  12.6 SECONDS (9.4-12.5)  H  04/08/18  12:20    


 


INR  1.09  (0.93-1.08)  H  04/08/18  12:20    


 


APTT  26.9 Seconds (25.1-36.5)   04/08/18  12:20    














- Constitutional


Appears: Non-toxic





- Head Exam


Head Exam: ATRAUMATIC, NORMAL INSPECTION, NORMOCEPHALIC





- Eye Exam


Eye Exam: Normal appearance


Pupil Exam: NORMAL ACCOMODATION





- Neck Exam


Neck Exam: Full ROM, Normal Inspection





- Respiratory Exam


Respiratory Exam: Clear to Ausculation Bilateral, NORMAL BREATHING PATTERN





- Cardiovascular Exam


Cardiovascular Exam: REGULAR RHYTHM, +S1, +S2





- GI/Abdominal Exam


GI & Abdominal Exam: Soft, Normal Bowel Sounds





- Extremities Exam


Extremities Exam: Normal Capillary Refill, Normal Inspection





- Back Exam


Back Exam: NORMAL INSPECTION





- Neurological Exam


Neurological Exam: Alert, Oriented x3





- Psychiatric Exam


Psychiatric exam: Normal Affect





- Skin


Skin Exam: Normal Color, Warm





Assessment and Plan





- Assessment and Plan (Free Text)


Assessment: 





1. pelvic mass : CT guided biopsy of pelvic mass done today. tolerated 

procedure well. 


 slightly elevated. 


Will await biopsy results.


Discussed at length with the patient. 


Surgery consulted. No surgical intervention required now. 


She will need gynec onc evaluation after biopsy results.  





2. Pain control with current meds. 





3. Nausea : zofran 4 mg tid, prn if needed. 





4. Poor PO intake. 


5. DM II ; uncontrolled. 





Thank you Dr. Goldsmith for allowing us to participate in her care.

## 2018-04-09 NOTE — PN
SUBJECTIVE:  The patient has no complaints of any chest pain, no shortness

of breath, no headaches.  She says she does have abdominal pain.  She says

the pain medication has helped.



PHYSICAL EXAMINATION:

VITAL SIGNS:  Temperature is 98.2, pulse of 82, blood pressure 137/79,

respirations 19.

GENERAL:  The patient is lying in bed, flat, comfortable.

HEENT:  No oral lesion.  Anicteric sclerae.  Moist mucosa.

NECK:  No JVD, adenopathy, or thyromegaly.

CARDIOVASCULAR:  S1 and S2, regular.  No murmurs, rubs, or gallops.

LUNGS:  Clear to auscultation bilaterally.  No wheeze, rales, or rhonchi.

ABDOMEN:  Bowel sounds are positive, soft.  There is tenderness in the

suprapubic area.  No rebound, no guarding.

EXTREMITIES:  No cyanosis, clubbing, or edema.



LABORATORY DATA:  White count of 14.9, hemoglobin 7.8.



ASSESSMENT:

1.  Complex mass in the pelvis measuring 11.7 x 11.3 x 8.6 cm.

2.  Gallstones.

3.  Hypertension.

4.  Diabetes type 2.

5.  Left eye blindness.



PLAN:  The patient is currently admitted to the hospital.  She is going to

be seen by Oncology with Dr. Matthews to see the patient.  The patient is on

Flagyl.  She is on morphine for pain.  She is on IV fluids.  I will place

her on Dilaudid for pain control.  She has a pelvic and transvaginal

ultrasound that is pending.  The patient is being followed by Dr. Mcgrath. 

We will also get physical therapy to evaluate the patient.  We will wait

further input from the consultants.  We will place her on fingersticks to

check her insulin.









__________________________________________

Javan Goldsmith MD





DD:  04/09/2018 8:30:15

DT:  04/09/2018 10:22:23

Job # [id number]

## 2018-04-09 NOTE — US
HISTORY:

Abdominal pain, ovarian mass suspected 



COMPARISON:

None available.



TECHNIQUE:

Negative study for primary or reactivation tuberculosis



FINDINGS:



UTERUS:

Measures 4.3 x 6 x 10.9 cm. Enlarged, heterogeneous uterus pelvic 

mass inseparable from the uterus.  This has both solid and cystic 

components measures 10.1 x 11.5 x 14.9 cm. There are cystic and solid 

areas. This is not typical for uterine fibroid.



ENDOMETRIUM:

Measures 13.0 mm in diameter. Heterogeneous echo characteristics.  

Cystic areas within the endometrium consistent with cystic 

endometrial hyperplasia. 



CERVIX:

No cervical abnormality identified.



RIGHT OVARY:

Not visualized.



LEFT OVARY:

Not visualized.



FREE FLUID:

Trace free fluid identified in the pelvis/cul de sac.



OTHER FINDINGS:

None. 



IMPRESSION:

Enlarged mass inseparable from the uterus but not typical appearance 

for uterine fibroid. Cystic neoplasm perhaps adnexal in origin likely 

in should be considered in further evaluation is recommended.



Thickened abnormal endometrium in a postmenopausal individual.  

Follow-up recommended.



Cross-sectional imaging recommendations for noninvasive follow-up: 

Pelvic MRI.

## 2018-04-09 NOTE — CT
PROCEDURE:  CT guided left pelvic biopsy.



HISTORY:

Large complex cystic left adnexal mass.  Evaluate for malignancy 



PHYSICIAN(S):  Arya Polk MD.



TECHNIQUE:

The relative risks and indications of the procedure were explained to 

the patient and consent obtained. The patient was placed supine on 

the CT scanner and preliminary images through the pelvis obtained. 

Conscious sedation and monitoring were provided throughout the 

procedure by a nurse.



There is a lobulated 7.5 x 14 cm complex cystic mass in the left 

adnexa.. A left anterior approach was selected and the area prepped 

and draped in the usual sterile fashion. 1% Xylocaine was used to 

anesthetize the skin and soft tissues. A 17-gauge guiding needle was 

advanced into the solid component of the left adnexal mass. Its 

position was confirmed with CT. Using coaxial technique, multiple 

core biopsies were obtained. The postprocedure images show no 

evidence of significant hemorrhage.



IMPRESSION:

1. CT-guided left pelvic biopsy as described above.

## 2018-04-09 NOTE — US
PROCEDURE:  Pelvic ultrasound



HISTORY:

ovarian cyst







COMPARISON:

04/08/2018..



TECHNIQUE:

Transabdominal, transvaginal. Real -time technique with 2D, duplex 

and color Doppler.



FINDINGS:

Normal adnexa not visualize.  Cystic pelvic mass with solid 

components identified, finding confirmed on recent CT scan. The mass 

is inseparable from the uterus. The adnexa are not visualized on the 

current study.



Endometrial hypertrophy, thickened irregular endometrium perhaps 

cystic endometrial hyperplasia.



IMPRESSION:

Complex pelvic mass.  Pelvic MRI recommended for further evaluation. 

In addition, pelvic MRI should be directed at the abnormal 

endometrium.

## 2018-04-09 NOTE — RAD
HISTORY:

abdominal pain, eval for free air  



COMPARISON:

No prior. 



FINDINGS:



LUNGS:

No active pulmonary disease.



PLEURA:

No significant pleural effusion identified, no pneumothorax apparent.



CARDIOVASCULAR:

Normal.



OSSEOUS STRUCTURES:

No significant abnormalities.



VISUALIZED UPPER ABDOMEN:

Normal.



OTHER FINDINGS:

None.



IMPRESSION:

No active disease.

## 2018-04-10 VITALS
DIASTOLIC BLOOD PRESSURE: 78 MMHG | HEART RATE: 88 BPM | SYSTOLIC BLOOD PRESSURE: 140 MMHG | TEMPERATURE: 97.7 F | OXYGEN SATURATION: 97 %

## 2018-04-10 VITALS — RESPIRATION RATE: 20 BRPM

## 2018-04-10 LAB
ALBUMIN SERPL-MCNC: 3.2 G/DL (ref 3–4.8)
ALBUMIN/GLOB SERPL: 1 {RATIO} (ref 1.1–1.8)
ALT SERPL-CCNC: 20 U/L (ref 7–56)
AST SERPL-CCNC: 37 U/L (ref 14–36)
BUN SERPL-MCNC: 17 MG/DL (ref 7–21)
CALCIUM SERPL-MCNC: 9.1 MG/DL (ref 8.4–10.5)
ERYTHROCYTE [DISTWIDTH] IN BLOOD BY AUTOMATED COUNT: 13.5 % (ref 11.5–14.5)
GFR NON-AFRICAN AMERICAN: > 60
HGB BLD-MCNC: 9.4 G/DL (ref 12–16)
MCH RBC QN AUTO: 29.7 PG (ref 25–35)
MCHC RBC AUTO-ENTMCNC: 32.4 G/DL (ref 31–37)
MCV RBC AUTO: 91.5 FL (ref 80–105)
PLATELET # BLD: 316 10^3/UL (ref 120–450)
PMV BLD AUTO: 9.3 FL (ref 7–11)
RBC # BLD AUTO: 3.17 10^6/UL (ref 3.5–6.1)
WBC # BLD AUTO: 10.2 10^3/UL (ref 4.5–11)

## 2018-04-10 RX ADMIN — METRONIDAZOLE SCH MLS/HR: 500 INJECTION, SOLUTION INTRAVENOUS at 05:40

## 2018-04-10 RX ADMIN — HYDROMORPHONE HYDROCHLORIDE PRN MG: 1 INJECTION, SOLUTION INTRAMUSCULAR; INTRAVENOUS; SUBCUTANEOUS at 10:24

## 2018-04-10 RX ADMIN — LEVOFLOXACIN SCH MG: 5 INJECTION, SOLUTION INTRAVENOUS at 09:59

## 2018-04-10 RX ADMIN — INSULIN HUMAN SCH UNITS: 100 INJECTION, SOLUTION PARENTERAL at 12:24

## 2018-04-10 RX ADMIN — INSULIN HUMAN SCH: 100 INJECTION, SOLUTION PARENTERAL at 18:28

## 2018-04-10 RX ADMIN — INSULIN HUMAN SCH: 100 INJECTION, SOLUTION PARENTERAL at 08:00

## 2018-04-10 RX ADMIN — METRONIDAZOLE SCH MLS/HR: 500 INJECTION, SOLUTION INTRAVENOUS at 14:04

## 2018-04-10 NOTE — CP.PCM.PN
Subjective





- Date & Time of Evaluation


Date of Evaluation: 04/10/18


Time of Evaluation: 07:10





- Subjective


Subjective: 





Surgery 





Patient seen and examined at bedside. No acute events. C/O nausea. pain 

controlled. 





Objective





- Vital Signs/Intake and Output


Vital Signs (last 24 hours): 


 











Temp Pulse Resp BP Pulse Ox


 


 98.5 F   95 H  19   118/73   92 L


 


 04/09/18 16:01  04/09/18 16:01  04/09/18 16:01  04/09/18 16:01  04/09/18 16:01











- Medications


Medications: 


 Current Medications





Hydromorphone HCl (Dilaudid)  1 mg IVP Q4H PRN


   PRN Reason: Pain, severe (8-10)


   Last Admin: 04/09/18 12:02 Dose:  1 mg


Sodium Chloride (Sodium Chloride 0.9%)  1,000 mls @ 80 mls/hr IV .[id number] [facility]


   Last Admin: 04/08/18 18:03 Dose:  80 mls/hr


Metronidazole (Flagyl)  500 mg in 100 mls @ 100 mls/hr IVPB Q8 SANDY


   PRN Reason: Protocol


   Last Admin: 04/10/18 05:40 Dose:  100 mls/hr


Insulin Human Regular (Humulin R Low)  0 units SC ACHS SANDY


   PRN Reason: Protocol


   Last Admin: 04/09/18 21:44 Dose:  2 units


Levofloxacin/Dextrose (Levaquin 750mg)  750 mg IVPB DAILY [facility]


   PRN Reason: Protocol


   Last Admin: 04/09/18 10:13 Dose:  750 mg


Morphine Sulfate (Morphine)  1 mg IVP Q4H PRN


   PRN Reason: Pain, moderate (4-7)


Ondansetron HCl (Zofran Inj)  4 mg IVP Q8H [facility]


   Last Admin: 04/10/18 05:40 Dose:  Not Given


Ondansetron HCl (Zofran Inj)  4 mg IVP Q8H PRN


   PRN Reason: Nausea/Vomiting











- Labs


Labs: 


 





 04/09/18 05:45 





 04/10/18 06:00 





 











PT  12.6 SECONDS (9.4-12.5)  H  04/08/18  12:20    


 


INR  1.09  (0.93-1.08)  H  04/08/18  12:20    


 


APTT  26.9 Seconds (25.1-36.5)   04/08/18  12:20    














- Constitutional


Appears: No Acute Distress





- Head Exam


Head Exam: ATRAUMATIC, NORMAL INSPECTION, NORMOCEPHALIC





- Eye Exam


Eye Exam: EOMI, Normal appearance, PERRL


Pupil Exam: NORMAL ACCOMODATION, PERRL





- ENT Exam


ENT Exam: Mucous Membranes Moist, Normal Exam





- Neck Exam


Neck Exam: Full ROM, Normal Inspection.  absent: Lymphadenopathy





- Respiratory Exam


Respiratory Exam: Clear to Ausculation Bilateral, NORMAL BREATHING PATTERN





- Cardiovascular Exam


Cardiovascular Exam: REGULAR RHYTHM, +S1, +S2.  absent: Murmur





- GI/Abdominal Exam


GI & Abdominal Exam: Soft, Tenderness, Normal Bowel Sounds.  absent: Distended, 

Firm, Guarding, Rigid





- Rectal Exam


Additional comments: 





Obese 





- Extremities Exam


Extremities Exam: Full ROM, Normal Capillary Refill, Normal Inspection.  absent

: Joint Swelling, Pedal Edema





- Back Exam


Back Exam: NORMAL INSPECTION





- Neurological Exam


Neurological Exam: Alert, Awake, CN II-XII Intact, Normal Gait, Oriented x3





- Psychiatric Exam


Psychiatric exam: Normal Affect, Normal Mood





- Skin


Skin Exam: Dry, Intact, Normal Color, Warm





Assessment and Plan





- Assessment and Plan (Free Text)


Assessment: 





61 y/o female w/ abdominal pain most likely 2/2 pelvic mass 


US uterine mass, adnexal complex cystic mass, 1.5 cm endometrium


 : 37





Plan: 





-pain control 


-nausea control 


-no acute general surgical intervention at this time 


-Recommend Gyn Onc 


-f/u Arnulfo Mcgrath

## 2018-04-10 NOTE — PN
DATE:



SUBJECTIVE:  The patient has no complaints of any chest pain.  She states

her abdominal pain is better with pain medications.  No headaches.  No

dizziness.



PHYSICAL EXAMINATION

VITAL SIGNS:  Temperature is 98.5, pulse of 95, blood pressure is 118/73,

respirations 19.

GENERAL:  The patient is lying in bed, flat, comfortable.

HEENT:  No oral lesion.  Anicteric sclerae.  Moist mucosa.

NECK:  No JVD, adenopathy, or thyromegaly.

CARDIOVASCULAR:  S1 and S2, regular.  No murmurs, rubs, or gallops.

LUNGS:  Clear to auscultation bilaterally.  No wheeze, rales, or rhonchi.

ABDOMEN:  Bowel sounds are positive.  Soft, nontender and nondistended.

EXTREMITIES:  No cyanosis, clubbing or edema.



LABS:  White count is 14.4.



ASSESSMENT

1.  Complex mass in the pelvis, 11.7 x 11.3 x 8.6 cm, status post biopsy.

2.  Gallstones.

3.  Hypertension.

4.  Diabetes type 2.

5.  Left eye blindness.



PLAN:  The patient is currently comfortable.  She is on Flagyl.  White

count is improving.  I do not believe that this is sepsis as blood cultures

and urine cultures have been negative.  The patient is on antibiotics and

the white count did improve.  She is also on Flagyl, we will continue.  At

this point, the patient is on Zofran as needed.  She is on a regular diet. 

She is on insulin sliding scale.  She is going to be seen by Physical

Therapy.  I will see if she qualifies for the Transitional Care Unit.





__________________________________________

Javan Goldsmith MD



DD:  04/10/2018 5:57:46

DT:  04/10/2018 6:46:19

Job # [id number]

## 2018-04-23 NOTE — PQF GENQUE
***** This form is a permanent part of the medical record*****

DR FRANKLIN,



Please verify and document the path report diagnosis in the chart so i can code 
it properly.



         

Clarification of your documentation is requested to better reflect the severity 
of illness and intensity of treatment of your patient.  



Indicators present      



[]  Specify: []

         



[]  Specify: []         

 



[]  Specify: []         





[]  Specify: []         





Location in the medical record that reflects the above clinical findings:  []

 



Treatment Provided:  []

  

PHYSICIAN'S RESPONSE





Malignant Neoplasm of Left Ovary



Based on your medical judgment of the clinical indicators outlined above please 
clarify the following:



[]  Practitioner response 



[]  If unable to determine, please check the box, sign and date.  





Present On Admission (POA) Indicator:





[] Present at the time of admission 



[] Not present at the time of admission



[] Clinically Undetermined









In responding to this query, please exercise your independent professional 
judgment.  The fact that a question is asked does not imply that any particular 
answer is desired or expected.  Thank you for your clarification on this 
documentation.



If you have any questions please call:[ ]

* Thank you,

   [X ] YASEMIN

   HIM Coder
TAHIR

## 2018-04-24 ENCOUNTER — HOSPITAL ENCOUNTER (INPATIENT)
Dept: HOSPITAL 42 - ED | Age: 63
LOS: 6 days | Discharge: HOME | DRG: 755 | End: 2018-04-30
Attending: INTERNAL MEDICINE | Admitting: INTERNAL MEDICINE
Payer: MEDICARE

## 2018-04-24 VITALS — BODY MASS INDEX: 37 KG/M2

## 2018-04-24 DIAGNOSIS — R18.8: ICD-10-CM

## 2018-04-24 DIAGNOSIS — R65.10: ICD-10-CM

## 2018-04-24 DIAGNOSIS — I10: ICD-10-CM

## 2018-04-24 DIAGNOSIS — E11.9: ICD-10-CM

## 2018-04-24 DIAGNOSIS — K59.00: ICD-10-CM

## 2018-04-24 DIAGNOSIS — G89.3: ICD-10-CM

## 2018-04-24 DIAGNOSIS — E66.9: ICD-10-CM

## 2018-04-24 DIAGNOSIS — K80.20: ICD-10-CM

## 2018-04-24 DIAGNOSIS — Z88.0: ICD-10-CM

## 2018-04-24 DIAGNOSIS — C56.2: Primary | ICD-10-CM

## 2018-04-24 DIAGNOSIS — H54.62: ICD-10-CM

## 2018-04-24 DIAGNOSIS — K56.7: ICD-10-CM

## 2018-04-24 DIAGNOSIS — Z79.84: ICD-10-CM

## 2018-04-24 DIAGNOSIS — K56.609: ICD-10-CM

## 2018-04-24 LAB
ALBUMIN SERPL-MCNC: 4.3 G/DL (ref 3–4.8)
ALBUMIN/GLOB SERPL: 1.2 {RATIO} (ref 1.1–1.8)
ALT SERPL-CCNC: 23 U/L (ref 7–56)
AMYLASE SERPL-CCNC: 55 U/L (ref 35–125)
ANISOCYTOSIS BLD QL SMEAR: (no result)
APPEARANCE UR: CLEAR
APTT BLD: 25.8 SECONDS (ref 25.1–36.5)
AST SERPL-CCNC: 34 U/L (ref 14–36)
BACTERIA #/AREA URNS HPF: (no result) /[HPF]
BASOPHILS # BLD AUTO: 0.02 K/MM3 (ref 0–2)
BASOPHILS NFR BLD: 0.1 % (ref 0–3)
BILIRUB UR-MCNC: NEGATIVE MG/DL
BUN SERPL-MCNC: 23 MG/DL (ref 7–21)
CALCIUM SERPL-MCNC: 9.6 MG/DL (ref 8.4–10.5)
COLOR UR: YELLOW
EOSINOPHIL # BLD: 0 10*3/UL (ref 0–0.7)
EOSINOPHIL NFR BLD: 0 % (ref 1.5–5)
ERYTHROCYTE [DISTWIDTH] IN BLOOD BY AUTOMATED COUNT: 13.4 % (ref 11.5–14.5)
GFR NON-AFRICAN AMERICAN: > 60
GLUCOSE UR STRIP-MCNC: 250 MG/DL
GRANULOCYTES # BLD: 19.61 10*3/UL (ref 1.4–6.5)
GRANULOCYTES NFR BLD: 94.6 % (ref 50–68)
HGB BLD-MCNC: 11.9 G/DL (ref 12–16)
HYPOCHROMIA BLD QL SMEAR: SLIGHT
INR PPP: 1.15 (ref 0.93–1.08)
LIPASE SERPL-CCNC: 47 U/L (ref 23–300)
LYMPHOCYTE: 3 % (ref 22–35)
LYMPHOCYTES # BLD: 0.6 10*3/UL (ref 1.2–3.4)
LYMPHOCYTES NFR BLD AUTO: 2.8 % (ref 22–35)
MCH RBC QN AUTO: 29.8 PG (ref 25–35)
MCHC RBC AUTO-ENTMCNC: 33.1 G/DL (ref 31–37)
MCV RBC AUTO: 90.2 FL (ref 80–105)
MONOCYTE: 1 % (ref 1–6)
MONOCYTES # BLD AUTO: 0.5 10*3/UL (ref 0.1–0.6)
MONOCYTES NFR BLD: 2.5 % (ref 1–6)
NEUTROPHILS NFR BLD AUTO: 90 % (ref 50–70)
NEUTS BAND NFR BLD: 5 % (ref 0–2)
PH UR STRIP: 5.5 [PH] (ref 4.7–8)
PLATELET # BLD EST: (no result) 10*3/UL
PLATELET # BLD: 528 10^3/UL (ref 120–450)
PMV BLD AUTO: 9.7 FL (ref 7–11)
PROT UR STRIP-MCNC: 30 MG/DL
PROTHROMBIN TIME: 13.2 SECONDS (ref 9.4–12.5)
RBC # BLD AUTO: 3.99 10^6/UL (ref 3.5–6.1)
RBC # UR STRIP: NEGATIVE /UL
RBC #/AREA URNS HPF: NEGATIVE /HPF (ref 0–2)
SP GR UR STRIP: > 1.03 (ref 1–1.03)
TOXIC GRANULES BLD QL SMEAR: (no result)
TROPONIN I SERPL-MCNC: < 0.01 NG/ML
UROBILINOGEN UR STRIP-ACNC: 0.2 E.U./DL
VARIANT LYMPHS NFR BLD MANUAL: 1 % (ref 0–0)
WBC # BLD AUTO: 20.8 10^3/UL (ref 4.5–11)

## 2018-04-24 RX ADMIN — VANCOMYCIN HYDROCHLORIDE SCH MLS/HR: 1 INJECTION, POWDER, LYOPHILIZED, FOR SOLUTION INTRAVENOUS at 23:33

## 2018-04-24 RX ADMIN — HYDROMORPHONE HYDROCHLORIDE PRN MG: 1 INJECTION, SOLUTION INTRAMUSCULAR; INTRAVENOUS; SUBCUTANEOUS at 22:02

## 2018-04-24 RX ADMIN — MORPHINE SULFATE STA MG: 4 INJECTION, SOLUTION INTRAMUSCULAR; INTRAVENOUS at 15:54

## 2018-04-24 RX ADMIN — MORPHINE SULFATE STA MG: 4 INJECTION, SOLUTION INTRAMUSCULAR; INTRAVENOUS at 15:53

## 2018-04-24 NOTE — ED PDOC
Arrival/HPI





- General


Chief Complaint: Abdominal Pain


Time Seen by Provider: 04/24/18 14:56


Historian: Patient





- History of Present Illness


Narrative History of Present Illness (Text): 





04/24/18 15:22


62 year old female, whose past medical history includes, left ovarian mass, 

diabetes and hypertension, who presents to the emergency department complaining 

of worsening of left lower abdominal pain today.  Patient stated pain is severe 

to the point she feels she could "pass out", and feeling nauseous.  Patient 

stated she was reviewing pelvic biopsy with Dr. Matthews, who told patient she has 

a malignant ovarian Ca.


Denies cp, sob, rectal bleeding, urinary symptoms, weakness, paresthesias, cms, 

ha, or fever.


Time/Duration: Other (see hpi)


Context: Home





Past Medical History





- Provider Review


Nursing Documentation Reviewed: Yes





- Cardiac


Hx Cardiac Disorders: Yes


Hx Hypertension: Yes





- Pulmonary


Hx Respiratory Disorders: No





- Neurological


Hx Neurological Disorder: No





- HEENT


Hx HEENT Disorder: Yes


Hx Blind: Yes (left eye)


Other/Comment: Meniere's Disease





- Renal


Hx Renal Disorder: No





- Endocrine/Metabolic


Hx Diabetes Mellitus Type 2: Yes





- Hematological/Oncological


Hx Blood Disorders: No





- Integumentary


Hx Dermatological Disorder: No





- Musculoskeletal/Rheumatological


Hx Musculoskeletal Disorders: No





- Gastrointestinal


Hx Gastrointestinal Disorders: Yes


Other/Comment: Peritonitis





- Genitourinary/Gynecological


Hx Genitourinary Disorders: No





- Psychiatric


Hx Psychophysiologic Disorder: No


Hx Substance Use: No





- Surgical History


Hx Orthopedic Surgery: Yes (right rotator cuff)





Family/Social History





- Physician Review


Nursing Documentation Reviewed: Yes


Family/Social History: Other (noncontributory)


Smoking Status: Never Smoked


Hx Alcohol Use: No


Hx Substance Use: No





Allergies/Home Meds


Allergies/Adverse Reactions: 


Allergies





Penicillins Allergy (Verified 04/24/18 15:11)


 URTICARIA


acetaminophen [From Percocet] Adverse Reaction (Verified 04/24/18 15:11)


 DIZZINESS


aspirin Adverse Reaction (Verified 04/24/18 15:11)


 PAIN


oxycodone Adverse Reaction (Verified 04/24/18 15:11)


 DIZZINESS








Home Medications: 


 Home Meds











 Medication  Instructions  Recorded  Confirmed


 


Ramipril [Altace] 10 mg PO DAILY 04/24/18 04/24/18














Review of Systems





- Review of Systems


Constitutional: Normal.  absent: Fatigue, Weight Change, Fevers, Night Sweats


Eyes: Normal


ENT: Normal


Respiratory: Normal.  absent: SOB, Cough, Sputum


Cardiovascular: Normal.  absent: Chest Pain, Palpitations


Gastrointestinal: Abdominal Pain, Nausea.  absent: Constipation, Diarrhea, 

Vomiting


Genitourinary Female: Normal.  absent: Dysuria, Frequency, Hematuria


Musculoskeletal: Normal


Skin: Normal.  absent: Rash, Skin Lesions


Neurological: Normal.  absent: Headache, Dizziness, Focal Weakness, Facial Droop


Endocrine: Normal


Hemo/Lymphatic: Normal


Psychiatric: Normal





Physical Exam


Vital Signs











  Temp Pulse Resp BP Pulse Ox


 


 04/24/18 22:00  98.6 F  113 H  20  150/85 


 


 04/24/18 18:15   110 H  20  119/66  96


 


 04/24/18 16:30   101 H  19  135/71  100


 


 04/24/18 15:11  98.1 F  125 H  19  123/79  99


 


 04/24/18 15:07  98.1 F  123 H  19  127/82  99











Temperature: Afebrile


Blood Pressure: Normal


Pulse: Regular


Respiratory Rate: Normal


Appearance: Positive for: Well-Appearing, Non-Toxic, Comfortable


Pain Distress: None


Mental Status: Positive for: Alert and Oriented X 3





- Systems Exam


Head: Present: Atraumatic, Normocephalic


Pupils: Present: PERRL


Extroacular Muscles: Present: EOMI


Conjunctiva: Present: Normal


Mouth: Present: Moist Mucous Membranes


Neck: Present: Normal Range of Motion


Respiratory/Chest: Present: Clear to Auscultation, Good Air Exchange.  No: 

Respiratory Distress, Accessory Muscle Use


Cardiovascular: Present: Regular Rate and Rhythm, Normal S1, S2.  No: Murmurs


Abdomen: Present: Tenderness, Normal Bowel Sounds.  No: Distention, Peritoneal 

Signs, Rebound, Guarding


Back: Present: Normal Inspection.  No: CVA Tenderness


Upper Extremity: Present: Normal Inspection, Normal ROM.  No: Cyanosis, Edema


Lower Extremity: Present: Normal Inspection, Normal ROM.  No: Edema


Neurological: Present: GCS=15, CN II-XII Intact, Speech Normal, Motor Func 

Grossly Intact, Normal Sensory Function, Normal Cerebellar Funct


Skin: Present: Warm, Dry, Normal Color.  No: Rashes


Psychiatric: Present: Alert, Oriented x 3, Normal Insight, Normal Concentration





Medical Decision Making


ED Course and Treatment: 





I SPOKE WITH DR. FRANKLIN REGARDING INTRACTABLE ABDOMINAL PAIN.  HE AGREED WITH 

OBSERVATION ADMISSION


Reassessment Condition: Re-examined, Improving,but remains with symptoms





- Lab Interpretations


Microbiology Results: 


Microbiology Results





04/24/18 15:36   Blood-Venous   Blood Culture - Preliminary


                            NO GROWTH AFTER 3 DAYS


04/24/18 16:00   Blood-Venous   Blood Culture - Preliminary


                            NO GROWTH AFTER 48 HOURS








Lab Results: 








 04/24/18 15:36 





 04/24/18 15:36 





 Lab Results





04/24/18 19:00: Urine Color Yellow, Urine Appearance Clear, Urine pH 5.5, Ur 

Specific Gravity > 1.030, Urine Protein 30 H, Urine Glucose (UA) 250 H, Urine 

Ketones Trace H, Urine Blood Negative, Urine Nitrate Negative, Urine Bilirubin 

Negative, Urine Urobilinogen 0.2, Ur Leukocyte Esterase Negative, Urine RBC 

Negative, Urine WBC 2 - 5, Ur Epithelial Cells 10 - 12, Urine Bacteria Mod


04/24/18 18:37: POC Glucose (mg/dL) 257 H


04/24/18 15:36: Sodium 134, Potassium 5.0, Chloride 97 L, Carbon Dioxide 24, 

Anion Gap 18, BUN 23 H, Creatinine 0.9, Est GFR (African Amer) > 60, Est GFR (

Non-Af Amer) > 60, Random Glucose 304 H* D, Calcium 9.6, Total Bilirubin 0.7, 

AST 34, ALT 23, Alkaline Phosphatase 95, Lactate Dehydrogenase 1206 H, Total 

Creatine Kinase 32 L, Troponin I < 0.01, Total Protein 7.8, Albumin 4.3, 

Globulin 3.5, Albumin/Globulin Ratio 1.2, Amylase 55, Lipase 47


04/24/18 15:36: PT 13.2 H, INR 1.15 H, APTT 25.8


04/24/18 15:36: WBC 20.8 H D, RBC 3.99, Hgb 11.9 L D, Hct 36.0, MCV 90.2, MCH 

29.8, MCHC 33.1, RDW 13.4, Plt Count 528 H, MPV 9.7, Gran % 94.6 H, Lymph % (

Auto) 2.8 L, Mono % (Auto) 2.5, Eos % (Auto) 0.0 L, Baso % (Auto) 0.1, Gran # 

19.61 H, Lymph # (Auto) 0.6 L, Mono # (Auto) 0.5, Eos # (Auto) 0.0, Baso # (Auto

) 0.02, Neutrophils % (Manual) 90 H, Band Neutrophils % 5 H, Lymphocytes % (

Manual) 3 L, Atypical Lymphs % 1 H, Monocytes % (Manual) 1, Toxic Granulation 1+

, Platelet Evaluation High, Hypochromasia Slight, Anisocytosis (manual) 1+








I have reviewed the lab results: Yes


Interpretation: Abnormal lab values





- RAD Interpretation


Narrative RAD Interpretations (Text): 





04/24/18 15:46








FINDINGS:





LUNGS:


No active pulmonary disease.





PLEURA:


No significant pleural effusion identified, no pneumothorax apparent.





CARDIOVASCULAR:


Normal.





OSSEOUS STRUCTURES:


No significant abnormalities.





VISUALIZED UPPER ABDOMEN:


Normal.





OTHER FINDINGS:


None.





IMPRESSION:


No active disease.











04/24/18 18:34


PROCEDURE:  CT Abdomen and Pelvis with contrast





FINDINGS:





LOWER THORAX:


Unremarkable. 





LIVER:


Unremarkable. No gross lesion or ductal dilatation. 





GALLBLADDER AND BILE DUCTS:


Multiple large gallstones are again demonstrated. There is a small amount of 

ascites with some fluid around the gallbladder. 





PANCREAS:


Unremarkable. No gross lesion or ductal dilatation.





SPLEEN:


Unremarkable. 





ADRENALS:


Unremarkable. No mass. 





KIDNEYS AND URETERS:


Unremarkable. No hydronephrosis. No solid mass. 





VASCULATURE:


Unremarkable. No aortic aneurysm. 





BOWEL:


Unremarkable. No obstruction. No gross mural thickening. 





APPENDIX:


Normal appendix. 





PERITONEUM:


Unremarkable. No free fluid. No free air. 





LYMPH NODES:


Unremarkable. No enlarged lymph nodes. 





BLADDER:


Unremarkable. 





REPRODUCTIVE:


There is a complex cystic and solid mass in the pelvis that is suspicious for 

an ovarian malignancy. This measures 11.7 cm AP by 11.3 cm height by 8.6 cm 

wide. This is unchanged from the recent exam. 





BONES:


No acute fracture. 





OTHER FINDINGS:


None.





IMPRESSION:


There is a complex cystic and solid mass in the pelvis that is suspicious for 

an ovarian malignancy. This measures 11.7 cm AP by 11.3 cm height by 8.6 cm 

wide. This is unchanged from the recent exam.





Mild ascites. 





Multiple large gallstones.








Radiology Orders: 








04/24/18 15:19


CHEST PORTABLE [RAD] Stat 





04/24/18 15:21


ABD & PELVIS IV CONTRAST ONLY [CT] Stat 














- EKG Interpretation


Interpreted by ED Physician: Yes (Sinus tachycardia @ 117 bpm.  No ST changes)


Type: 12 lead EKG


Comparison: No previous EKG avail.





- Medication Orders


Current Medication Orders: 








Fentanyl (Duragesic)  1 patch TD Q72H SANDY


   Last Admin: 04/26/18 11:00  Dose: 1 patch





Hydromorphone HCl (Dilaudid)  2 mg PO Q4 PRN


   PRN Reason: Pain, moderate (4-7)


   Last Admin: 04/25/18 21:40 Dose:  2 mg


Hydromorphone HCl (Dilaudid)  2 mg IVP Q4H PRN


   PRN Reason: Pain, severe (8-10)


Aztreonam (Azactam 1 Gm)  100 mls @ 100 mls/hr IVPB Q8 SANDY


   PRN Reason: Protocol


   Stop: 05/04/18 06:01


   Last Admin: 04/27/18 14:17  Dose: 100 mls/hr





eMAR Start Stop


 Document     04/27/18 14:17  RA  (Rec: 04/27/18 15:17  RA  [id number])


     Intravenous Solution


      Start Date                                 04/27/18


      Start Time                                 14:20


      End Date                                   04/27/18


      End time                                   15:20


      Total Infusion Time                        60





Metronidazole (Flagyl)  500 mg in 100 mls @ 100 mls/hr IVPB Q8 SANDY


   PRN Reason: Protocol


   Stop: 05/04/18 06:01


   Last Admin: 04/27/18 14:17  Dose: 100 mls/hr





eMAR Start Stop


 Document     04/27/18 14:17  RA  (Rec: 04/27/18 15:18  RA  [id number])


     Intravenous Solution


      Start Date                                 04/27/18


      Start Time                                 14:20


      End Date                                   04/27/18


      End time                                   15:20


      Total Infusion Time                        60





Vancomycin HCl (Vancomycin 1gm)  1 gm in 250 mls @ 167 mls/hr IVPB Q12H SANDY


   PRN Reason: Protocol


   Stop: 05/03/18 23:16


   Last Admin: 04/27/18 10:16  Dose: 167 mls/hr





eMAR Start Stop


 Document     04/27/18 10:16  RA  (Rec: 04/27/18 10:16  RA  [id number])


     Intravenous Solution


      Start Date                                 04/27/18


      Start Time                                 10:16


      End Date                                   04/27/18


      End time                                   11:20


      Total Infusion Time                        64





Sodium Chloride (Sodium Chloride 0.9%)  1,000 mls @ 80 mls/hr IV .[id number] SANDY


   Last Admin: 04/27/18 10:19  Dose: 80 mls/hr





eMAR Start Stop


 Document     04/27/18 10:19  RA  (Rec: 04/27/18 10:20  RA  [id number])


     Intravenous Solution


      Start Date                                 04/27/18


      Start Time                                 10:19


      End Date                                   04/27/18


      End time                                   22:20


      Total Infusion Time                        721





Insulin Human Regular (Humulin R Low)  0 units SC ACHS SANDY


   PRN Reason: Protocol


   Last Admin: 04/27/18 11:11 Dose:  Not Given


   Non-Admin Reason: NPO





Ondansetron HCl (Zofran Inj)  8 mg IVP Q6H [facility]


   Last Admin: 04/27/18 15:54 Dose:  Not Given


   Non-Admin Reason: Patient Refused





Discontinued Medications





Docusate Sodium (Colace)  100 mg PO BID [facility]


   Last Admin: 04/27/18 08:10  Dose:  





Famotidine (Pepcid)  20 mg IVP STAT STA


   Stop: 04/24/18 15:17


   Last Admin: 04/24/18 15:54  Dose: 20 mg





IVP Administration


 Document     04/24/18 15:54  [email or web address]  (Rec: 04/24/18 15:54  [email or web address]  [id number]

)


     Charges for Administration


      # of IVP Administrations                   1





Fentanyl (Duragesic)  1 patch TD Q72H [facility]


   Last Admin: 04/24/18 22:01  Dose: 1 patch





MAR Transdermal Patch Site


 Document     04/24/18 22:01  WILL  (Rec: 04/24/18 22:01  WILL  [id number])


     Transdermal Patch Site


      Transdermal Patch Site                     Left Lower Abdomen





Hydromorphone HCl (Dilaudid)  0.5 mg IVP STAT STA


   Stop: 04/24/18 17:40


   Last Admin: 04/24/18 17:52  Dose: 0.5 mg





MAR Pain Assessment


 Document     04/24/18 17:52  [email or web address]  (Rec: 04/24/18 17:52  [email or web address]  [id number]

)


     Pain Reassessment


      Is this a pain reassessment?               No


     Sleep


      Is patient sleeping during reassessment?   No


     Presence of Pain


      Presence of Pain                           Yes


     Pain Scale Used


      Pain Scale Used                            Numeric


     Location


      Upper or Lower                             Lower


      Pain Location Body Site                    Abdomen


     Description


      Description                                Constant


      Intensity of Pain at present               9


      Pain Behavior                              Facial Grimacing


      Aggravating Factors                        Changing Position


      Alleviating Factors/Management             Medication


       Techniques                                


      Alleviating Factors                        Medication


IVP Administration


 Document     04/24/18 17:52  [email or web address]  (Rec: 04/24/18 17:52  [email or web address]  [id number]

)


     Charges for Administration


      # of IVP Administrations                   1





Hydromorphone HCl (Dilaudid)  0.5 mg IVP STAT STA


   Stop: 04/24/18 17:40


   Last Admin: 04/24/18 17:52  Dose: 0.5 mg





MAR Pain Assessment


 Document     04/24/18 17:52  CASTS1  (Rec: 04/24/18 17:53  [email or web address]  [id number]

)


     Pain Reassessment


      Is this a pain reassessment?               No


     Sleep


      Is patient sleeping during reassessment?   No


     Presence of Pain


      Presence of Pain                           Yes


     Pain Scale Used


      Pain Scale Used                            Numeric


     Location


      Upper or Lower                             Lower


      Pain Location Body Site                    Abdomen


     Description


      Description                                Constant


      Intensity of Pain at present               9


      Pain Behavior                              Facial Grimacing


      Aggravating Factors                        Changing Position


      Alleviating Factors/Management             Medication


       Techniques                                


      Alleviating Factors                        Medication


IVP Administration


 Document     04/24/18 17:52  CASTS1  (Rec: 04/24/18 17:53  [email or web address]  [id number]

)


     Charges for Administration


      # of IVP Administrations                   1





Hydromorphone HCl (Dilaudid)  1 mg IVP Q4H PRN


   PRN Reason: Pain, severe (8-10)


   Last Admin: 04/26/18 11:08  Dose: 1 mg





MAR Pain Assessment


 Document     04/26/18 11:08  [facility]  (Rec: 04/26/18 11:09  BIR  [id number])


     Pain Reassessment


      Is this a pain reassessment?               No


     Sleep


      Is patient sleeping during reassessment?   No


     Presence of Pain


      Presence of Pain                           Yes


IVP Administration


 Document     04/26/18 11:08  BIR  (Rec: 04/26/18 11:09  BIR  [id number])


     Charges for Administration


      # of IVP Administrations                   1


Re-Assess: MAR Pain Assessment


 Document     04/26/18 12:08  FC  (Rec: 04/26/18 20:15  FC  [id number])


     Pain Reassessment


      Is this a pain reassessment?               No





Hydromorphone HCl (Dilaudid)  2 mg IVP Q4H PRN


   PRN Reason: Pain, severe (8-10)


Sodium Chloride (Sodium Chloride 0.9%)  1,000 mls @ 999 mls/hr IV .Q1H1M STA


   Stop: 04/24/18 16:20


   Last Admin: 04/24/18 15:52  Dose: 999 mls/hr





eMAR Start Stop


 Document     04/24/18 15:52  CASTS1  (Rec: 04/24/18 15:52  CASTS1  [id number]

)


     Intravenous Solution


      Start Date                                 04/24/18


      Start Time                                 15:52


      End Date                                   04/24/18





Piperacillin Sod/Tazobactam Sod (Zosyn 4.5 Gm In Ns 100ml)  4.5 gm in 100 mls @ 

200 mls/hr IVPB STAT STA


   PRN Reason: Protocol


   Stop: 04/24/18 19:06


   Last Admin: 04/24/18 21:45  Dose: 200 mls/hr





eMAR Start Stop


 Document     04/24/18 21:45  WILL  (Rec: 04/24/18 21:46  WILL  [id number])


     Intravenous Solution


      Start Date                                 04/24/18


      Start Time                                 21:45


      End Date                                   04/24/18


      End time                                   22:15


      Total Infusion Time                        30





Sodium Chloride (Sodium Chloride 0.9%)  1,000 mls @ 75 mls/hr IV .[id number] SAND[facility]


   Last Admin: 04/24/18 21:46  Dose: 75 mls/hr





eMAR Start Stop


 Document     04/24/18 21:46  WILL  (Rec: 04/24/18 21:46  WILL  [id number])


     Intravenous Solution


      Start Date                                 04/24/18


      Start Time                                 21:46





Metformin HCl (Glucophage)  1,000 mg PO DAILY SANDY


   Last Admin: 04/26/18 10:37  Dose: 1,000 mg





Morphine Sulfate (Morphine)  4 mg IVP STAT STA


   Stop: 04/24/18 15:17


   Last Admin: 04/24/18 15:54  Dose: 4 mg





MAR Pain Assessment


 Document     04/24/18 15:54  CASTS1  (Rec: 04/24/18 15:55  CASTS1  [id number]

)


     Pain Reassessment


      Is this a pain reassessment?               No


     Sleep


      Is patient sleeping during reassessment?   No


     Presence of Pain


      Presence of Pain                           Yes


     Pain Scale Used


      Pain Scale Used                            Numeric


     Location


      Upper or Lower                             Lower


      Pain Location Body Site                    Abdomen


     Description


      Description                                Constant


      Intensity of Pain at present               9


      Pain Behavior                              Facial Grimacing


      Aggravating Factors                        Changing Position


      Alleviating Factors/Management             Medication


       Techniques                                


      Alleviating Factors                        Medication


IVP Administration


 Document     04/24/18 15:54  CASTS1  (Rec: 04/24/18 15:55  CASTS1  [id number]

)


     Charges for Administration


      # of IVP Administrations                   1





Ondansetron HCl (Zofran Inj)  4 mg IVP STAT STA


   Stop: 04/24/18 15:17


   Last Admin: 04/24/18 16:14  Dose: 4 mg





IVP Administration


 Document     04/24/18 16:14  CASTS1  (Rec: 04/24/18 16:14  CASTS1  [id number]

)


     Charges for Administration


      # of IVP Administrations                   1





Ondansetron HCl (Zofran Inj)  4 mg IVP Q4H [facility]


   Last Admin: 04/25/18 21:55 Dose:  Not Given


   Non-Admin Reason: Patient Refused





Ondansetron HCl (Zofran Inj)  4 mg IVP Q6H PRN


   PRN Reason: Nausea/Vomiting


   Last Admin: 04/26/18 11:14  Dose: 4 mg





IVP Administration


 Document     04/26/18 11:14  BIR  (Rec: 04/26/18 11:14  [facility]  [id number])


     Charges for Administration


      # of IVP Administrations                   1





Pneumococcal Polyvalent Vaccine (Pneumovax 23 Vaccine)  0.5 ml IM .ONCE ONE


   Stop: 04/24/18 23:04











Disposition/Present on Arrival





- Present on Arrival


Any Indicators Present on Arrival: No


History of DVT/PE: No


History of Uncontrolled Diabetes: No


Urinary Catheter: No


History of Decub. Ulcer: No


History Surgical Site Infection Following: None





- Disposition


Have Diagnosis and Disposition been Completed?: Yes


Diagnosis: 


 Intractable abdominal pain





Disposition: HOSPITALIZED


Disposition Time: 18:35


Patient Plan: Observation


Patient Problems: 


 Current Active Problems











Problem Status Onset


 


Intractable abdominal pain Acute  











Condition: STABLE

## 2018-04-24 NOTE — RAD
HISTORY:

admission  



COMPARISON:

4/8/2018 



FINDINGS:



LUNGS:

No active pulmonary disease.



PLEURA:

No significant pleural effusion identified, no pneumothorax apparent.



CARDIOVASCULAR:

Normal.



OSSEOUS STRUCTURES:

No significant abnormalities.



VISUALIZED UPPER ABDOMEN:

Normal.



OTHER FINDINGS:

None.



IMPRESSION:

No active disease.

## 2018-04-24 NOTE — CT
PROCEDURE:  CT Abdomen and Pelvis with contrast



HISTORY:

LLQ abdominal pain



COMPARISON:

04/08/2018 CT



TECHNIQUE:

Contrast dose: 100 cc of Omni 350



Radiation dose:



Total exam DLP = 1079 mGy-cm.



This CT exam was performed using one or more of the following dose 

reduction techniques: Automated exposure control, adjustment of the 

mA and/or kV according to patient size, and/or use of iterative 

reconstruction technique.



FINDINGS:



LOWER THORAX:

Unremarkable. 



LIVER:

Unremarkable. No gross lesion or ductal dilatation. 



GALLBLADDER AND BILE DUCTS:

Multiple large gallstones are again demonstrated. There is a small 

amount of ascites with some fluid around the gallbladder. 



PANCREAS:

Unremarkable. No gross lesion or ductal dilatation.



SPLEEN:

Unremarkable. 



ADRENALS:

Unremarkable. No mass. 



KIDNEYS AND URETERS:

Unremarkable. No hydronephrosis. No solid mass. 



VASCULATURE:

Unremarkable. No aortic aneurysm. 



BOWEL:

Unremarkable. No obstruction. No gross mural thickening. 



APPENDIX:

Normal appendix. 



PERITONEUM:

Unremarkable. No free fluid. No free air. 



LYMPH NODES:

Unremarkable. No enlarged lymph nodes. 



BLADDER:

Unremarkable. 



REPRODUCTIVE:

There is a complex cystic and solid mass in the pelvis that is 

suspicious for an ovarian malignancy. This measures 11.7 cm AP by 

11.3 cm height by 8.6 cm wide. This is unchanged from the recent 

exam. 



BONES:

No acute fracture. 



OTHER FINDINGS:

None.



IMPRESSION:

There is a complex cystic and solid mass in the pelvis that is 

suspicious for an ovarian malignancy. This measures 11.7 cm AP by 

11.3 cm height by 8.6 cm wide. This is unchanged from the recent exam.



Mild ascites. 



Multiple large gallstones.

## 2018-04-25 LAB
ALBUMIN SERPL-MCNC: 3.4 G/DL (ref 3–4.8)
ALBUMIN/GLOB SERPL: 1.1 {RATIO} (ref 1.1–1.8)
ALT SERPL-CCNC: 25 U/L (ref 7–56)
AST SERPL-CCNC: 30 U/L (ref 14–36)
BUN SERPL-MCNC: 23 MG/DL (ref 7–21)
CALCIUM SERPL-MCNC: 8.8 MG/DL (ref 8.4–10.5)
ERYTHROCYTE [DISTWIDTH] IN BLOOD BY AUTOMATED COUNT: 13.7 % (ref 11.5–14.5)
GFR NON-AFRICAN AMERICAN: > 60
HGB BLD-MCNC: 9.7 G/DL (ref 12–16)
MCH RBC QN AUTO: 28.8 PG (ref 25–35)
MCHC RBC AUTO-ENTMCNC: 31.9 G/DL (ref 31–37)
MCV RBC AUTO: 90.2 FL (ref 80–105)
PLATELET # BLD: 458 10^3/UL (ref 120–450)
PMV BLD AUTO: 9.5 FL (ref 7–11)
RBC # BLD AUTO: 3.37 10^6/UL (ref 3.5–6.1)
WBC # BLD AUTO: 14.1 10^3/UL (ref 4.5–11)

## 2018-04-25 RX ADMIN — METRONIDAZOLE SCH MLS/HR: 500 INJECTION, SOLUTION INTRAVENOUS at 15:05

## 2018-04-25 RX ADMIN — INSULIN HUMAN SCH UNITS: 100 INJECTION, SOLUTION PARENTERAL at 08:04

## 2018-04-25 RX ADMIN — INSULIN HUMAN SCH UNITS: 100 INJECTION, SOLUTION PARENTERAL at 17:14

## 2018-04-25 RX ADMIN — METRONIDAZOLE SCH MLS/HR: 500 INJECTION, SOLUTION INTRAVENOUS at 05:10

## 2018-04-25 RX ADMIN — HYDROMORPHONE HYDROCHLORIDE PRN MG: 1 INJECTION, SOLUTION INTRAMUSCULAR; INTRAVENOUS; SUBCUTANEOUS at 05:10

## 2018-04-25 RX ADMIN — HYDROMORPHONE HYDROCHLORIDE PRN MG: 1 INJECTION, SOLUTION INTRAMUSCULAR; INTRAVENOUS; SUBCUTANEOUS at 20:04

## 2018-04-25 RX ADMIN — INSULIN HUMAN SCH: 100 INJECTION, SOLUTION PARENTERAL at 21:26

## 2018-04-25 RX ADMIN — AZTREONAM SCH MLS/HR: 1 INJECTION, SOLUTION INTRAVENOUS at 21:30

## 2018-04-25 RX ADMIN — METRONIDAZOLE SCH MLS/HR: 500 INJECTION, SOLUTION INTRAVENOUS at 21:30

## 2018-04-25 RX ADMIN — AZTREONAM SCH MLS/HR: 1 INJECTION, SOLUTION INTRAVENOUS at 05:54

## 2018-04-25 RX ADMIN — VANCOMYCIN HYDROCHLORIDE SCH MLS/HR: 1 INJECTION, POWDER, LYOPHILIZED, FOR SOLUTION INTRAVENOUS at 23:36

## 2018-04-25 RX ADMIN — INSULIN HUMAN SCH UNITS: 100 INJECTION, SOLUTION PARENTERAL at 12:00

## 2018-04-25 RX ADMIN — AZTREONAM SCH MLS/HR: 1 INJECTION, SOLUTION INTRAVENOUS at 14:00

## 2018-04-25 RX ADMIN — HYDROMORPHONE HYDROCHLORIDE PRN MG: 1 INJECTION, SOLUTION INTRAMUSCULAR; INTRAVENOUS; SUBCUTANEOUS at 10:09

## 2018-04-25 RX ADMIN — HYDROMORPHONE HYDROCHLORIDE PRN MG: 1 INJECTION, SOLUTION INTRAMUSCULAR; INTRAVENOUS; SUBCUTANEOUS at 01:19

## 2018-04-25 RX ADMIN — HYDROMORPHONE HYDROCHLORIDE PRN MG: 1 INJECTION, SOLUTION INTRAMUSCULAR; INTRAVENOUS; SUBCUTANEOUS at 16:26

## 2018-04-25 RX ADMIN — VANCOMYCIN HYDROCHLORIDE SCH MLS/HR: 1 INJECTION, POWDER, LYOPHILIZED, FOR SOLUTION INTRAVENOUS at 10:16

## 2018-04-25 NOTE — CARD
--------------- APPROVED REPORT --------------





EKG Measurement

Heart [id number]

MT [id number]

[id number]

[id number]

[id number]



<Conclusion>

Sinus tachycardia

Moderate voltage criteria for LVH, may be normal variant

## 2018-04-25 NOTE — CP.PCM.CON
History of Present Illness





- History of Present Illness


History of Present Illness: 


62 year old female with PMH of DM, HTN, obesity with BMI 37 [place] [facility] [place] 

complaining of worsening left lower abdominal pain. She was recently found to 

have a left ovarian mass and biopsy was done showing ovarian cancer. She has 

some nausea and an episode of vomiting, but no diarrhea, no cough or colds, no 

fever or chills, no chest pain, no headache or dizziness, no dysuria. She is 

noted to have leukocytosis and Infectious Diseases consult is requested to 

further evaluate and manage.





Review of Systems





- Review of Systems


All systems: reviewed and no additional remarkable complaints except (as per HPI

)





Past Patient History





- Past Social History


Smoking Status: Never Smoked





- CARDIAC


Hx Cardiac Disorders: Yes


Hx Hypertension: Yes





- PULMONARY


Hx Respiratory Disorders: No





- NEUROLOGICAL


Hx Neurological Disorder: No





- HEENT


Hx HEENT Problems: Yes


Hx Blind: Yes (left eye)


Other/Comment: Meniere's Disease





- RENAL


Hx Chronic Kidney Disease: No





- ENDOCRINE/METABOLIC


Hx Diabetes Mellitus Type 2: Yes





- HEMATOLOGICAL/ONCOLOGICAL


Hx Blood Disorders: No





- INTEGUMENTARY


Hx Dermatological Problems: No





- MUSCULOSKELETAL/RHEUMATOLOGICAL


Hx Falls: No





- GASTROINTESTINAL


Hx Gastrointestinal Disorders: Yes


Other/Comment: Peritonitis





- GENITOURINARY/GYNECOLOGICAL


Hx Genitourinary Disorders: No





- PSYCHIATRIC


Hx Psychophysiologic Disorder: No





- SURGICAL HISTORY


Hx Orthopedic Surgery: Yes (right rotator cuff)





Meds


Allergies/Adverse Reactions: 


 Allergies











Allergy/AdvReac Type Severity Reaction Status Date / Time


 


Penicillins Allergy  URTICARIA Verified 04/24/18 15:11


 


acetaminophen [From Percocet] AdvReac  DIZZINESS Verified 04/24/18 15:11


 


aspirin AdvReac  PAIN Verified 04/24/18 15:11


 


oxycodone AdvReac  DIZZINESS Verified 04/24/18 15:11














- Medications


Medications: 


 Current Medications





Docusate Sodium (Colace)  100 mg PO BID [facility]


Fentanyl (Duragesic)  1 patch TD Q72H [facility]


   Last Admin: 04/25/18 03:16 Dose:  Not Given


Hydromorphone HCl (Dilaudid)  1 mg IVP Q4H PRN


   PRN Reason: Pain, severe (8-10)


   Last Admin: 04/25/18 10:09 Dose:  1 mg


Hydromorphone HCl (Dilaudid)  2 mg PO Q4 PRN


   PRN Reason: Pain, moderate (4-7)


Aztreonam (Azactam 1 Gm)  100 mls @ 100 mls/hr IVPB Q8 S[facility]Y


   PRN Reason: Protocol


   Stop: 05/04/18 06:01


   Last Admin: 04/25/18 05:54 Dose:  100 mls/hr


Metronidazole (Flagyl)  500 mg in 100 mls @ 100 mls/hr IVPB Q8 S[facility]Y


   PRN Reason: Protocol


   Stop: 05/04/18 06:01


   Last Admin: 04/25/18 05:10 Dose:  100 mls/hr


Vancomycin HCl (Vancomycin 1gm)  1 gm in 250 mls @ 167 mls/hr IVPB Q12H [facility]


   PRN Reason: Protocol


   Stop: 05/03/18 23:16


   Last Admin: 04/25/18 10:16 Dose:  167 mls/hr


Insulin Human Regular (Humulin R Low)  0 units SC ACHS S[facility]Y


   PRN Reason: Protocol


   Last Admin: 04/25/18 08:04 Dose:  1 units


Ondansetron HCl (Zofran Inj)  4 mg IVP Q4H [facility]


   Last Admin: 04/25/18 06:50 Dose:  Not Given











Physical Exam





- Constitutional


Appears: Chronically Ill





- Head Exam


Head Exam: NORMAL INSPECTION





- Neck Exam


Neck exam: Negative for: Lymphadenopathy, Meningismus





- Respiratory Exam


Respiratory Exam: Decreased Breath Sounds





- Cardiovascular Exam


Cardiovascular Exam: +S1, +S2





- GI/Abdominal Exam


GI & Abdominal Exam: Soft.  absent: Tenderness





Results





- Vital Signs


Recent Vital Signs: 


 Last Vital Signs











Temp  98.3 F   04/25/18 00:39


 


Pulse  107 H  04/25/18 00:39


 


Resp  16   04/25/18 00:39


 


BP  120/73   04/25/18 00:39


 


Pulse Ox  98   04/25/18 00:39














- Labs


Result Diagrams: 


 04/25/18 06:50





 04/25/18 06:50


Labs: 


 Laboratory Results - last 24 hr











  04/24/18 04/25/18 04/25/18





  21:54 06:50 06:50


 


WBC   14.1 H D 


 


RBC   3.37 L 


 


Hgb   9.7 L D 


 


Hct   30.4 L 


 


MCV   90.2 


 


MCH   28.8 


 


MCHC   31.9 


 


RDW   13.7 


 


Plt Count   458 H 


 


MPV   9.5 


 


Sodium    134


 


Potassium    4.7


 


Chloride    101


 


Carbon Dioxide    26


 


Anion Gap    12


 


BUN    23 H


 


Creatinine    0.9


 


Est GFR ( Amer)    > 60


 


Est GFR (Non-Af Amer)    > 60


 


POC Glucose (mg/dL)  238 H  


 


Random Glucose    236 H


 


Calcium    8.8


 


Phosphorus    3.5


 


Magnesium    1.9


 


Total Bilirubin    0.6


 


AST    30


 


ALT    25


 


Alkaline Phosphatase    74


 


Total Protein    6.4


 


Albumin    3.4


 


Globulin    3.0


 


Albumin/Globulin Ratio    1.1














  04/25/18





  07:12


 


WBC 


 


RBC 


 


Hgb 


 


Hct 


 


MCV 


 


MCH 


 


MCHC 


 


RDW 


 


Plt Count 


 


MPV 


 


Sodium 


 


Potassium 


 


Chloride 


 


Carbon Dioxide 


 


Anion Gap 


 


BUN 


 


Creatinine 


 


Est GFR ( Amer) 


 


Est GFR (Non-Af Amer) 


 


POC Glucose (mg/dL)  222 H


 


Random Glucose 


 


Calcium 


 


Phosphorus 


 


Magnesium 


 


Total Bilirubin 


 


AST 


 


ALT 


 


Alkaline Phosphatase 


 


Total Protein 


 


Albumin 


 


Globulin 


 


Albumin/Globulin Ratio 














Assessment & Plan





- Assessment and Plan (Free Text)


Plan: 





Assessment


Systemic inflammatory response syndrome, consider due to ovarian cancer R/O 

sepsis from intra-abdominal infection


DM


HTN


obesity with BMI 37





Plan


Started the patient on Vancomycin, Azactam and Flagyl pending blood cx; 

reviewed CT A/P showing ovarian mass


will monitor clinically


overall prognosis is poor

## 2018-04-25 NOTE — CP.PCM.CON
History of Present Illness





- History of Present Illness


History of Present Illness: 





Palliative consult requested by Dr MARU Goldsmith


Reason: Goals of care and advance care planning





62 year old female with history of HTN,DM and ovarian mass who was sentt from 

her oncologists office with complaints of diffuse abdominal pain,nausea, fever 

and chills.She denies headache, chest pain, numbness,dysuria. The patient newly 

diagnosed with ovarian cancer has not discussed treatment plan with oncologist 

yet. Chest x ray negative. CT scan of abdomen confirms ovarian mass, multiple 

large galls stones and mild ascites. Labs;WBC 20.9,HGB 11.9, ,BUN 23, 

Creat 0.9,LDH 1206, glucose 304. 





PMHx: HTN, DM, blind left eye





PSH : right rotator cuff repair





Social History: Non smoker, no alcohol or drug use. , she is an 

international .





Family History: Cardiac,brother  of MI.





Advance Care Planning: The patient does not have an Advanced Directive.





Review of Systems:As per HPI, otherwise negative 12 point review.





Past Patient History





- Past Social History


Smoking Status: Never Smoked





- CARDIAC


Hx Cardiac Disorders: Yes


Hx Hypertension: Yes





- PULMONARY


Hx Respiratory Disorders: No





- NEUROLOGICAL


Hx Neurological Disorder: No





- HEENT


Hx HEENT Problems: Yes


Hx Blind: Yes (left eye)


Other/Comment: Meniere's Disease





- RENAL


Hx Chronic Kidney Disease: No





- ENDOCRINE/METABOLIC


Hx Diabetes Mellitus Type 2: Yes





- HEMATOLOGICAL/ONCOLOGICAL


Hx Blood Disorders: No





- INTEGUMENTARY


Hx Dermatological Problems: No





- MUSCULOSKELETAL/RHEUMATOLOGICAL


Hx Falls: No





- GASTROINTESTINAL


Hx Gastrointestinal Disorders: Yes


Other/Comment: Peritonitis





- GENITOURINARY/GYNECOLOGICAL


Hx Genitourinary Disorders: No





- PSYCHIATRIC


Hx Psychophysiologic Disorder: No





- SURGICAL HISTORY


Hx Orthopedic Surgery: Yes (right rotator cuff)





Meds


Allergies/Adverse Reactions: 


 Allergies











Allergy/AdvReac Type Severity Reaction Status Date / Time


 


Penicillins Allergy  URTICARIA Verified 18 15:11


 


acetaminophen [From Percocet] AdvReac  DIZZINESS Verified 18 15:11


 


aspirin AdvReac  PAIN Verified 18 15:11


 


oxycodone AdvReac  DIZZINESS Verified 18 15:11














- Medications


Medications: 


 Current Medications





Fentanyl (Duragesic)  1 patch TD Q72H [facility]


   Last Admin: 18 03:16 Dose:  Not Given


Hydromorphone HCl (Dilaudid)  1 mg IVP Q4H PRN


   PRN Reason: Pain, severe (8-10)


   Last Admin: 18 10:09 Dose:  1 mg


Hydromorphone HCl (Dilaudid)  2 mg PO Q4 PRN


   PRN Reason: Pain, moderate (4-7)


Aztreonam (Azactam 1 Gm)  100 mls @ 100 mls/hr IVPB Q8 [facility]Y


   PRN Reason: Protocol


   Stop: 18 06:01


   Last Admin: 18 05:54 Dose:  100 mls/hr


Metronidazole (Flagyl)  500 mg in 100 mls @ 100 mls/hr IVPB Q8 [facility]


   PRN Reason: Protocol


   Stop: 18 06:01


   Last Admin: 18 05:10 Dose:  100 mls/hr


Vancomycin HCl (Vancomycin 1gm)  1 gm in 250 mls @ 167 mls/hr IVPB Q12H [facility]


   PRN Reason: Protocol


   Stop: 18 23:16


   Last Admin: 18 10:16 Dose:  167 mls/hr


Insulin Human Regular (Humulin R Low)  0 units SC ACHS SANDY


   PRN Reason: Protocol


   Last Admin: 18 08:04 Dose:  1 units


Ondansetron HCl (Zofran Inj)  4 mg IVP Q4H [facility]


   Last Admin: 18 06:50 Dose:  Not Given











Physical Exam





- Head Exam


Head Exam: NORMAL INSPECTION





- ENT Exam


ENT Exam: Mucous Membranes Moist, Normal Oropharynx





- Neck Exam


Neck exam: Positive for: Normal Inspection





- Respiratory Exam


Respiratory Exam: Clear to Auscultation Bilateral, NORMAL BREATHING PATTERN





- Cardiovascular Exam


Cardiovascular Exam: Tachycardia, +S1, +S2





- GI/Abdominal Exam


GI & Abdominal Exam: Normal Bowel Sounds


Additional comments: 





diffuse abdominal tenderness





-  Exam


 Exam: NORMAL INSPECTION





- Extremities Exam


Extremities exam: Positive for: normal capillary refill, normal inspection





- Back Exam


Back exam: NORMAL INSPECTION





- Neurological Exam


Neurological exam: Alert, Oriented x3





- Skin


Skin Exam: Dry, Pallor, Warm





- Additional Findings


Additional findings: 





Palliative performance scale rating 80%





Results





- Vital Signs


Recent Vital Signs: 


 Last Vital Signs











Temp  98.3 F   18 00:39


 


Pulse  107 H  18 00:39


 


Resp  16   18 00:39


 


BP  120/73   18 00:39


 


Pulse Ox  98   18 00:39














- Labs


Result Diagrams: 


 18 06:50





 18 06:50


Labs: 


 Laboratory Results - last 24 hr











  18





  21:54 06:50 06:50


 


WBC   14.1 H D 


 


RBC   3.37 L 


 


Hgb   9.7 L D 


 


Hct   30.4 L 


 


MCV   90.2 


 


MCH   28.8 


 


MCHC   31.9 


 


RDW   13.7 


 


Plt Count   458 H 


 


MPV   9.5 


 


Sodium    134


 


Potassium    4.7


 


Chloride    101


 


Carbon Dioxide    26


 


Anion Gap    12


 


BUN    23 H


 


Creatinine    0.9


 


Est GFR ( Amer)    > 60


 


Est GFR (Non-Af Amer)    > 60


 


POC Glucose (mg/dL)  238 H  


 


Random Glucose    236 H


 


Calcium    8.8


 


Phosphorus    3.5


 


Magnesium    1.9


 


Total Bilirubin    0.6


 


AST    30


 


ALT    25


 


Alkaline Phosphatase    74


 


Total Protein    6.4


 


Albumin    3.4


 


Globulin    3.0


 


Albumin/Globulin Ratio    1.1














  18





  07:12


 


WBC 


 


RBC 


 


Hgb 


 


Hct 


 


MCV 


 


MCH 


 


MCHC 


 


RDW 


 


Plt Count 


 


MPV 


 


Sodium 


 


Potassium 


 


Chloride 


 


Carbon Dioxide 


 


Anion Gap 


 


BUN 


 


Creatinine 


 


Est GFR ( Amer) 


 


Est GFR (Non-Af Amer) 


 


POC Glucose (mg/dL)  222 H


 


Random Glucose 


 


Calcium 


 


Phosphorus 


 


Magnesium 


 


Total Bilirubin 


 


AST 


 


ALT 


 


Alkaline Phosphatase 


 


Total Protein 


 


Albumin 


 


Globulin 


 


Albumin/Globulin Ratio 














Assessment & Plan





- Assessment and Plan (Free Text)


Assessment: 





62 year old female with history of HTN, DM and ovarian cancer who is admitted 

with sepsis, abdominal pain and hyperglycemia.





The patient is alert, oriented mildly anxious. States she was just diagnosed 

with ovarian cancer and has not had the opportunity to discuss a treatment plan 

with her oncologist





Her pain is is diffuse but has more tenderness in RUQ upon examination. She 

states her pain level is 8 but is somewhat relieved with IV Dilaudid. She also 

has a Fentanyl 25 mcg patch.





When asked if she had a medical POA or advance directive she stated she did 

not. She is a  with no children. When asked about siblings she explained 

that her only brother had passed away and that she had very distant relatives 

in [place], but does not keep in touch with them. Explained that in light of 

current diagnosis it would be prudent to designate a POA and/or initiate and 

advanced directive so that her medical wishes could be honored. She states she 

hasn't had the time to think about "all of this and doesn't have anyone in 

mind. When asked about her friend Fatou, she indicated that she was just a 

colleague who works with her.  She states she is to tired and weak to think 

about this subject at this point in time. Reassured her that I would revisit 

and assist her with making plan for goals of care and advance directive. 

Psychosocial support offered. Spiritual support declined





Time spent in goal of care discussion, 20 minutes 


Plan: 





Goals of care and advance care planning





Hyperglycemia: Monitor glucose, continue Humalin R as ordered.





Pain: Fentanyl 25mcg transdermal patch. Continue Dilaudid 1mg IV for moderate 

breakthrough pain. Would discontinue Dilaudid 2 mg PO. Will start Colace, 

monitor for constipation.





Sepsis: Vancomycin,Azactam and Flagyl.Pan cultures. ID recommendations 





Nausea: Continue Zofran as ordered.

## 2018-04-25 NOTE — HP
CHIEF COMPLAINT AND HISTORY OF PRESENT ILLNESS:  This is a 62-year-old

female who had come into the hospital because of intractable abdominal

pain.  The patient was having severe pain secondary to recent diagnosis of

ovarian cancer.  She had gone to see her oncologist, Dr. Matthews, and was

sent into the ER for further evaluation.  The patient says that she is

having chills as well.  She was having so much pain, she felt as if she was

going to pass out, it was 10/10.  She was not able to walk well because of

the pain.  She has no dysuria, frequency.  No headaches, no dizziness.  The

patient does have a history of diabetes, hypertension.



REVIEW OF SYSTEMS:  All other review of symptoms are within normal limits

except what was mentioned.



ALLERGIES:  TO PENICILLIN, ACETAMINOPHEN, OXYCODONE, AND ASPIRIN.



FAMILY HISTORY:  Noncontributory.



PAST SURGICAL HISTORY:  Rotator cuff surgery.



PAST MEDICAL HISTORY:  Diabetes type 2, hypertension.



PHYSICAL EXAMINATION:

VITAL SIGNS:  The patient has a temperature of 98.3, pulse of 107, blood

pressure 120/73, respirations 16, O2 saturation 98%.

GENERAL:  The patient lying in bed, uncomfortable, and in no acute

distress.

HEENT:  Atraumatic and normocephalic.  Anicteric sclerae.  Moist mucosa. 

Pink conjunctivae.  No oral lesions.

NECK:  No JVD, anterior and posterior adenopathy, thyromegaly, or bruits.

CARDIOVASCULAR:  S1 and S2 regular.  No murmur, rubs, or gallop.

LUNGS:  Clear to auscultation bilaterally.  No wheezes, rales, or rhonchi.

ABDOMEN:  Bowel sounds are positive.  Soft, nontender and nondistended.  No

hepatosplenomegaly. No rebound and no guarding.

EXTREMITIES:  No cyanosis, clubbing, or edema.

NEUROLOGIC:  No facial asymmetry.  Tongue is midline.  No uvula deviation. 

Power is 5/5 upper extremity and lower extremity.  Sensation intact in

upper extremity and lower extremity.

PSYCHIATRIC:  She is awake, alert and oriented x3.  No anxiety or

depression.  She has normal affect.

GENITOURINARY:  No CVA tenderness.

VASCULAR:  2+ pulses in the carotid pulses and pedal pulses.

SKIN:  No erythema or nodules

SPINE:  Shows normal curvature.



LABORATORY DATA:  Shows white count of 20.8, hemoglobin is 11.9, platelet

count is 528.  Chemistry shows a sodium 134, potassium 5, creatinine 0.9. 

LDH is 1206.



CT of the abdomen and pelvis done shows a complex cyst and solid mass in

the pelvis that is suspicious for ovarian cancer, measures 11.7 x 11.3 cm. 

There are mild ascites, multiple large gallstones.



ASSESSMENT:

1.  Abdominal pain secondary to ovarian neoplasm.

2.  Hypertension.

3.  Diabetes type 2.

4.  Cholelithiasis.



PLAN:  The patient is currently on antibiotics with aztreonam.  The patient

was started on a fentanyl patch for pain.  She is on Dilaudid for pain. 

The patient is receiving Zofran.  She is on a heart-healthy diet.  The

patient had blood cultures, urine cultures done.  I will get Dr. Bishop

to evaluate the patient.  She will need advance directives as the patient

does not have any immediate family that is living in the area.  The

patient's next of kin is in [place] which is her brother.  The patient will

need advance directives.  I will also have Dr. Matthews from Oncology see the

patient.  The patient will get repeat blood work tomorrow.  She says her

pain is better.  She does not wish to use oxycodone, so I will place her on

Dilaudid for pain.







__________________________________________

Javan Goldsmith MD



DD:  04/25/2018 10:03:02

DT:  04/25/2018 10:06:42

Job # [id number]

## 2018-04-26 LAB
ALBUMIN SERPL-MCNC: 3.2 G/DL (ref 3–4.8)
ALBUMIN/GLOB SERPL: 1.1 {RATIO} (ref 1.1–1.8)
ALT SERPL-CCNC: 23 U/L (ref 7–56)
AST SERPL-CCNC: 30 U/L (ref 14–36)
BUN SERPL-MCNC: 19 MG/DL (ref 7–21)
CALCIUM SERPL-MCNC: 8.6 MG/DL (ref 8.4–10.5)
ERYTHROCYTE [DISTWIDTH] IN BLOOD BY AUTOMATED COUNT: 13.9 % (ref 11.5–14.5)
GFR NON-AFRICAN AMERICAN: > 60
HGB BLD-MCNC: 8.7 G/DL (ref 12–16)
MCH RBC QN AUTO: 28.7 PG (ref 25–35)
MCHC RBC AUTO-ENTMCNC: 31.9 G/DL (ref 31–37)
MCV RBC AUTO: 90.1 FL (ref 80–105)
PLATELET # BLD: 377 10^3/UL (ref 120–450)
PMV BLD AUTO: 9.3 FL (ref 7–11)
RBC # BLD AUTO: 3.03 10^6/UL (ref 3.5–6.1)
WBC # BLD AUTO: 14 10^3/UL (ref 4.5–11)

## 2018-04-26 RX ADMIN — INSULIN HUMAN SCH UNITS: 100 INJECTION, SOLUTION PARENTERAL at 11:54

## 2018-04-26 RX ADMIN — METRONIDAZOLE SCH MLS/HR: 500 INJECTION, SOLUTION INTRAVENOUS at 05:03

## 2018-04-26 RX ADMIN — METRONIDAZOLE SCH MLS/HR: 500 INJECTION, SOLUTION INTRAVENOUS at 13:37

## 2018-04-26 RX ADMIN — AZTREONAM SCH MLS/HR: 1 INJECTION, SOLUTION INTRAVENOUS at 05:04

## 2018-04-26 RX ADMIN — INSULIN HUMAN SCH UNITS: 100 INJECTION, SOLUTION PARENTERAL at 16:53

## 2018-04-26 RX ADMIN — HYDROMORPHONE HYDROCHLORIDE PRN MG: 1 INJECTION, SOLUTION INTRAMUSCULAR; INTRAVENOUS; SUBCUTANEOUS at 05:04

## 2018-04-26 RX ADMIN — INSULIN HUMAN SCH UNITS: 100 INJECTION, SOLUTION PARENTERAL at 21:29

## 2018-04-26 RX ADMIN — AZTREONAM SCH MLS/HR: 1 INJECTION, SOLUTION INTRAVENOUS at 21:20

## 2018-04-26 RX ADMIN — HYDROMORPHONE HYDROCHLORIDE PRN MG: 1 INJECTION, SOLUTION INTRAMUSCULAR; INTRAVENOUS; SUBCUTANEOUS at 00:51

## 2018-04-26 RX ADMIN — VANCOMYCIN HYDROCHLORIDE SCH MLS/HR: 1 INJECTION, POWDER, LYOPHILIZED, FOR SOLUTION INTRAVENOUS at 10:38

## 2018-04-26 RX ADMIN — AZTREONAM SCH MLS/HR: 1 INJECTION, SOLUTION INTRAVENOUS at 13:36

## 2018-04-26 RX ADMIN — INSULIN HUMAN SCH UNITS: 100 INJECTION, SOLUTION PARENTERAL at 09:01

## 2018-04-26 RX ADMIN — HYDROMORPHONE HYDROCHLORIDE PRN MG: 1 INJECTION, SOLUTION INTRAMUSCULAR; INTRAVENOUS; SUBCUTANEOUS at 11:08

## 2018-04-26 RX ADMIN — METRONIDAZOLE SCH MLS/HR: 500 INJECTION, SOLUTION INTRAVENOUS at 23:05

## 2018-04-26 NOTE — CP.PCM.PN
Subjective





- Date & Time of Evaluation


Date of Evaluation: 04/26/18


Time of Evaluation: 11:00





- Subjective


Subjective: 





Complaining of abdominal pain and nausea, did not vomit. No BM





Objective





- Vital Signs/Intake and Output


Vital Signs (last 24 hours): 


 











Temp Pulse Resp BP Pulse Ox


 


 98.6 F   112 H  19   143/87   90 L


 


 04/26/18 08:27  04/26/18 08:27  04/26/18 08:27  04/26/18 08:27  04/26/18 08:27








Intake and Output: 


 











 04/26/18 04/26/18





 06:59 18:59


 


Intake Total 1390 


 


Output Total 0 


 


Balance 1390 














- Medications


Medications: 


 Current Medications





Docusate Sodium (Colace)  100 mg PO BID [facility]


   Last Admin: 04/26/18 09:01 Dose:  100 mg


Fentanyl (Duragesic)  1 patch TD Q72H [facility]


   Last Admin: 04/26/18 11:00 Dose:  1 patch


Hydromorphone HCl (Dilaudid)  1 mg IVP Q4H PRN


   PRN Reason: Pain, severe (8-10)


   Last Admin: 04/26/18 11:08 Dose:  1 mg


Hydromorphone HCl (Dilaudid)  2 mg PO Q4 PRN


   PRN Reason: Pain, moderate (4-7)


   Last Admin: 04/25/18 21:40 Dose:  2 mg


Aztreonam (Azactam 1 Gm)  100 mls @ 100 mls/hr IVPB Q8 [facility]


   PRN Reason: Protocol


   Stop: 05/04/18 06:01


   Last Admin: 04/26/18 05:04 Dose:  100 mls/hr


Metronidazole (Flagyl)  500 mg in 100 mls @ 100 mls/hr IVPB Q8 [facility]


   PRN Reason: Protocol


   Stop: 05/04/18 06:01


   Last Admin: 04/26/18 05:03 Dose:  100 mls/hr


Vancomycin HCl (Vancomycin 1gm)  1 gm in 250 mls @ 167 mls/hr IVPB Q12H [facility]


   PRN Reason: Protocol


   Stop: 05/03/18 23:16


   Last Admin: 04/26/18 10:38 Dose:  167 mls/hr


Insulin Human Regular (Humulin R Low)  0 units SC ACHS SANDY


   PRN Reason: Protocol


   Last Admin: 04/26/18 11:54 Dose:  2 units


Metformin HCl (Glucophage)  1,000 mg PO DAILY [facility]


   Last Admin: 04/26/18 10:37 Dose:  1,000 mg


Ondansetron HCl (Zofran Inj)  4 mg IVP Q6H PRN


   PRN Reason: Nausea/Vomiting


   Last Admin: 04/26/18 11:14 Dose:  4 mg











- Labs


Labs: 


 





 04/26/18 06:00 





 04/26/18 06:45 





 











PT  13.2 SECONDS (9.4-12.5)  H  04/24/18  15:36    


 


INR  1.15  (0.93-1.08)  H  04/24/18  15:36    


 


APTT  25.8 Seconds (25.1-36.5)   04/24/18  15:36    














- Constitutional


Appears: Chronically Ill





- Eye Exam


Additional comments: 





right pupil equal and reactive to light, blind in left eye





- ENT Exam


ENT Exam: Mucous Membranes Moist





- Respiratory Exam


Respiratory Exam: Decreased Breath Sounds, NORMAL BREATHING PATTERN





- Cardiovascular Exam


Cardiovascular Exam: Tachycardia, +S1, +S2





- GI/Abdominal Exam


GI & Abdominal Exam: Firm, Hypoactive Bowel Sounds





- Extremities Exam


Extremities Exam: Normal Capillary Refill, Pedal Edema





- Back Exam


Back Exam: NORMAL INSPECTION





- Neurological Exam


Neurological Exam: Alert, Oriented x3





- Skin


Skin Exam: Dry, Pallor





Assessment and Plan





- Assessment and Plan (Free Text)


Assessment: 





62 year old female with history of blindness in left eye, ovarian cancer, DM, 

HTN who was admitted with sepsis, abdominal pain and nausea. 








The patient is anxious. States she is still has significant significant 

abdominal pain despite being medicated. She also complains of nausea, does nto 

want to eat, no vomiting. Has not had a BM since prior to admission, states she 

is passing gas. Refusing Ducolax suppository. Abdomen distended, slightly 

firmer than yesterday





Patient has not had a chance to think about naming a health care proxy nor 

completing and advanced directive. Not feeling up to disusing at this time. 


Plan: 





Goals of care and advance care planning





Abdominal Pain: Surgical consult regarding gall stones, r/o obstruction

## 2018-04-26 NOTE — PN
DATE:  04/26/2018



SUBJECTIVE:  Patient is in bed, in no acute distress, nontoxic.



PHYSICAL EXAMINATION:

VITAL SIGNS:  Temperature is 98, blood pressure 140/80, respiratory rate of

18, heart rate of 112.

HEENT:  Unremarkable.

NECK:  Supple.

LUNGS:  Have decreased breath sounds.

HEART:  Normal S1 and S2.

ABDOMEN:  Soft.



LABORATORY EXAMINATION:  Reveals the patient's white count of 14,000,

hemoglobin of 8.7, platelets of 377.  BUN of 19, creatinine of 0.7. 

Urinalysis is noted.  Microbiology reveals the blood culture is negative. 

Urine culture is negative.



ASSESSMENT AND PLAN:  A 62-year-old with systemic inflammatory response

syndrome, ovarian cancer, diabetes, hypertension, obesity, on vancomycin,

Azactam and Flagyl.  Review of the orders reveals the Azactam and

vancomycin to be active.





__________________________________________

Eugene Bishop MD



DD:  04/26/2018 19:29:19

DT:  04/26/2018 22:38:26

Job # [id number]

## 2018-04-26 NOTE — RAD
HISTORY:

intractable abdominal pain  



COMPARISON:

No prior.



FINDINGS:



BOWEL:

Multiple moderately dilated loops of small bowel are seen in the mid 

abdomen consistent with small bowel obstruction or ileus. The colon 

is unremarkable 



BONES:

Normal.



OTHER FINDINGS:

None.



IMPRESSION:

Multiple moderately dilated loops of small bowel are seen in the mid 

abdomen consistent with small bowel obstruction or ileus.

## 2018-04-26 NOTE — CON
DATE:



CONSULTATION REQUESTED BY:  Dr. Goldsmith.



REASON FOR CONSULTATION:  Malignant neoplasm in the abdomen.



HISTORY OF PRESENT ILLNESS:  Ms. Adams is a 62-year-old female presented

to the office yesterday with lower abdominal pain.  She was referred to the

ER for admission and pain control.  She was recently diagnosed with

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  She developed

abdominal pain for past 1 day with nausea and vomiting, vomited a few times

yesterday.  In the ED, white count was found to be elevated at 20,000. 

Concern was for sepsis.  Urine culture, blood culture have been negative. 

She is currently on Fentanyl patch and Dilaudid IV p.r.n.  Pain is better

controlled.  White count declined to 14,000.  Hemoglobin was 11.9, declined

to 9.7.



REVIEW OF SYSTEMS:  As per HPI.  Rest of 12-point systems reviewed and

negative.



ALLERGIES:  PENICILLIN, TYLENOL, CODEINE AND ASPIRIN.



FAMILY HISTORY:  Noncontributory.



PAST SURGICAL HISTORY:  Rotator cuff surgery.



PAST MEDICAL HISTORY:  Diabetes mellitus type 2, hypertension.



PHYSICAL EXAMINATION:

GENERAL:  Comfortable in bed, in no acute distress.

VITAL SIGNS:  Temperature 98.7, heart rate 80 per minute, blood pressure

110/70, respiratory rate 15 per minute, oxygen saturation 98% on room air.

HEENT:  PERRLA positive.

NECK:  No lymphadenopathy.

CHEST:  Air entry present and equal bilaterally.  No added sound.

CARDIOVASCULAR:  S1 and S2 normal.  No murmur.  No gallop.

ABDOMEN:  Soft, nontender.  No hepatosplenomegaly.

EXTREMITIES:  No edema.

NEUROLOGIC: Awake, alert and oriented x3.  No focal sensory or motor

deficit.



CAT scan of the abdomen showed ovarian mass, 11.7 cm in the pelvis,

multiple large gallstones.



ASSESSMENT:

1.  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

for malignancy, reason not certain.

2.  Hypertension.

3.  Diabetes mellitus type 2.

4.  Cholelithiasis.



PLAN:  Pain control, pain is better controlled with Fentanyl patch 25 mcg,

which will be started today and Dilaudid p.r.n.  I will consider

long-acting morphine if pain is not controlled by tomorrow, transition

Dilaudid to oral.  Senna and Colace for prophylaxis.  She is currently on

IV antibiotics aztreonam, Flagyl and vanco as per Dr. Bishop.  Urine

culture and blood culture are negative.  Zofran p.r.n.  Management of

abdominal malignancy likely ovarian.  She will need workup if we find the

primary malignancy.  We will do the PET scan.  We will schedule the PET

scan upon discharge from the hospital.  She will be referred to Gynecology

Oncology if no other focus of malignancy identified on PET scan.  I have

discussed the plan of management with the patient in the office and in the

ER.



Thank you Dr. Goldsmith for allowing us to participate in Ms. Adams's

care.





__________________________________________

Dang Matthews MD



DD:  04/25/2018 22:47:30

DT:  04/25/2018 23:49:39

Job # [id number]

## 2018-04-26 NOTE — PN
DATE:  04/26/2018



SUBJECTIVE:  The patient has no complaints of any chest pain or shortness

of breath.  She continues to have pain that is 8/10 in her belly.



PHYSICAL EXAMINATION:

VITAL SIGNS:  Temperature is 98.6, pulse of 112, blood pressure 143/87,

respirations 19.

GENERAL:  The patient is lying in bed, flat, comfortable.

HEENT:  No oral lesion.  Anicteric sclerae.  Moist mucosa.

NECK:  No JVD, adenopathy, or thyromegaly.

CARDIOVASCULAR:  S1 and S2, regular.  No murmurs, rubs, or gallops.

LUNGS:  Clear to auscultation bilaterally.  No wheeze, rales, or rhonchi.

ABDOMEN:  Bowel sounds are positive.  Soft, nontender and nondistended.

EXTREMITIES:  No cyanosis, clubbing or edema.



LABS:  White count is 14, hemoglobin 8.7, creatinine is 0.7.  Blood

cultures x2 have been negative.



ASSESSMENT:

1.  Abdominal pain secondary to ovarian neoplasm.

2.  Hypertension.

3.  Diabetes type 2.

4.  Leukocytosis.

5.  Cholelithiasis.



PLAN:  The patient is currently admitted to the hospital.  The patient's

pain is still not well controlled.  I will increase the patient's fentanyl

patch to 50 mcg.  She is on Dilaudid IV as well as p.o.  The patient does

not wish to take oxycodone or Percocet as she states that she has had

issues with these medications before.  She will need Gyn/Oncology to

evaluate further.  She is receiving IV antibiotics from Infectious Disease.

Urine cultures are still pending.  She is on aztreonam because of

herPENICILLIN ALLERGY.  She is on Colace for constipation and the patient

is on the fentanyl patch.  She is on Zofran as needed.  She is on a

heart-healthy diet.







__________________________________________

Javan Goldsmith MD





DD:  04/26/2018 9:23:55

DT:  04/26/2018 10:15:47

Job # [id number]

## 2018-04-26 NOTE — CP.PCM.PN
Subjective





- Date & Time of Evaluation


Date of Evaluation: 04/26/18


Time of Evaluation: 20:38





- Subjective


Subjective: 





General Surgery





Pt is a 62F with PMH of ovarian CA, HTN and DM.  Pt presents with LLQ abdominal 

pain, N/V. Patient had similar sx in last admission. Found to have [id number] L 

complex cystic ovarian mass. Bx was performed and showed ovarian CA. Pt was 

admitted for sepsis w WBC of 21, tachycardia. Surgery is consulted to evaluate 

for SBO.   Pt states it was a dull ache for most of the week until last night 

when it became a sharp pain, with associated nausea and vomiting. Last BM was 

before admission and hasb't had BM for 2 days. VOmiting worsen today. Started w 

gatric content then billious now. Pt had multiple episodes of vomiting today.  

Admits to having normal bowel movements throughout the past week.  Denies chest 

pain, hematemsis, dysuria,hematemesis, vaginal bleeding or hematochezia. CT of 

the abd 2 days ago shows L ovarian cystic mass, ascites. AXR today shows 

multiple loops of dilated SB. NGT was attempted. 200cc bilious fluids 

aspirated. Pt vomited while NGT placement and refused it. NGT was DCed. 

Benefits of NGT for SBO was explained prior to insertion. Pt agreed but was 

unable to tolerate it.  





PMH: HTN. DM


PSH: denies


ALL: penicillins, aspirin





Objective





- Vital Signs/Intake and Output


Vital Signs (last 24 hours): 


 











Temp Pulse Resp BP Pulse Ox


 


 98.3 F   114 H  19   166/93 H  96 


 


 04/26/18 16:00  04/26/18 16:00  04/26/18 16:00  04/26/18 16:00  04/26/18 16:00








Intake and Output: 


 











 04/26/18 04/27/18





 18:59 06:59


 


Intake Total 540 


 


Balance 540 














- Medications


Medications: 


 Current Medications





Docusate Sodium (Colace)  100 mg PO BID [facility]


   Last Admin: 04/26/18 09:01 Dose:  100 mg


Fentanyl (Duragesic)  1 patch TD Q72H [facility]


   Last Admin: 04/26/18 11:00 Dose:  1 patch


Hydromorphone HCl (Dilaudid)  2 mg PO Q4 PRN


   PRN Reason: Pain, moderate (4-7)


   Last Admin: 04/25/18 21:40 Dose:  2 mg


Hydromorphone HCl (Dilaudid)  2 mg IVP Q4H PRN


   PRN Reason: Pain, severe (8-10)


Aztreonam (Azactam 1 Gm)  100 mls @ 100 mls/hr IVPB Q8 [facility]


   PRN Reason: Protocol


   Stop: 05/04/18 06:01


   Last Admin: 04/26/18 13:36 Dose:  100 mls/hr


Metronidazole (Flagyl)  500 mg in 100 mls @ 100 mls/hr IVPB Q8 [facility]


   PRN Reason: Protocol


   Stop: 05/04/18 06:01


   Last Admin: 04/26/18 13:37 Dose:  100 mls/hr


Vancomycin HCl (Vancomycin 1gm)  1 gm in 250 mls @ 167 mls/hr IVPB Q12H [facility]


   PRN Reason: Protocol


   Stop: 05/03/18 23:16


   Last Admin: 04/26/18 10:38 Dose:  167 mls/hr


Sodium Chloride (Sodium Chloride 0.9%)  1,000 mls @ 80 mls/hr IV .[id number] [facility]


Insulin Human Regular (Humulin R Low)  0 units SC ACHS [facility]


   PRN Reason: Protocol


   Last Admin: 04/26/18 16:53 Dose:  4 units


Metformin HCl (Glucophage)  1,000 mg PO DAILY [facility]


   Last Admin: 04/26/18 10:37 Dose:  1,000 mg


Ondansetron HCl (Zofran Inj)  8 mg IVP Q6H [facility]











- Labs


Labs: 


 





 04/26/18 06:00 





 04/26/18 06:45 





 











PT  13.2 SECONDS (9.4-12.5)  H  04/24/18  15:36    


 


INR  1.15  (0.93-1.08)  H  04/24/18  15:36    


 


APTT  25.8 Seconds (25.1-36.5)   04/24/18  15:36    














- Constitutional


Appears: In Acute Distress





- Head Exam


Head Exam: ATRAUMATIC, NORMAL INSPECTION, NORMOCEPHALIC





- Eye Exam


Eye Exam: absent: EOMI, Normal appearance


Additional comments: 





Legally blind





- ENT Exam


ENT Exam: Mucous Membranes Moist, Normal Exam





- Neck Exam


Neck Exam: Full ROM, Normal Inspection.  absent: Lymphadenopathy





- Respiratory Exam


Respiratory Exam: Clear to Ausculation Bilateral, NORMAL BREATHING PATTERN





- Cardiovascular Exam


Cardiovascular Exam: Tachycardia, REGULAR RHYTHM, +S1, +S2.  absent: Murmur





- GI/Abdominal Exam


GI & Abdominal Exam: Distended, Soft, Tenderness, Normal Bowel Sounds.  absent: 

Firm, Guarding, Rigid





-  Exam


 Exam: NORMAL INSPECTION





- Extremities Exam


Extremities Exam: Full ROM, Normal Capillary Refill, Normal Inspection.  absent

: Joint Swelling, Pedal Edema





- Back Exam


Back Exam: NORMAL INSPECTION





- Neurological Exam


Neurological Exam: Alert, Awake, CN II-XII Intact, Normal Gait, Oriented x3





- Psychiatric Exam


Psychiatric exam: Normal Affect, Normal Mood





- Skin


Skin Exam: Dry, Intact, Normal Color, Warm





Assessment and Plan





- Assessment and Plan (Free Text)


Assessment: 





SBO v ileus





-NPO


-IVF


-NGT if she can tolerate


-Serial abd exam





DW Dr. Mora

## 2018-04-27 LAB
% IRON SATURATION: 15 % (ref 20–55)
BASOPHILS # BLD AUTO: 0.02 K/MM3 (ref 0–2)
BASOPHILS NFR BLD: 0.1 % (ref 0–3)
EOSINOPHIL # BLD: 0 10*3/UL (ref 0–0.7)
EOSINOPHIL NFR BLD: 0.1 % (ref 1.5–5)
ERYTHROCYTE [DISTWIDTH] IN BLOOD BY AUTOMATED COUNT: 13.9 % (ref 11.5–14.5)
FERRITIN SERPL-MCNC: 801 NG/ML
FOLATE SERPL-MCNC: 5.7 NG/ML
GRANULOCYTES # BLD: 13.13 10*3/UL (ref 1.4–6.5)
GRANULOCYTES NFR BLD: 83.7 % (ref 50–68)
HGB BLD-MCNC: 9.9 G/DL (ref 12–16)
IRON SERPL-MCNC: 29 UG/DL (ref 45–180)
LYMPHOCYTES # BLD: 1.1 10*3/UL (ref 1.2–3.4)
LYMPHOCYTES NFR BLD AUTO: 6.9 % (ref 22–35)
MCH RBC QN AUTO: 29.1 PG (ref 25–35)
MCHC RBC AUTO-ENTMCNC: 32.5 G/DL (ref 31–37)
MCV RBC AUTO: 89.7 FL (ref 80–105)
MONOCYTES # BLD AUTO: 1.4 10*3/UL (ref 0.1–0.6)
MONOCYTES NFR BLD: 9.2 % (ref 1–6)
PLATELET # BLD: 451 10^3/UL (ref 120–450)
PMV BLD AUTO: 9.6 FL (ref 7–11)
RBC # BLD AUTO: 3.4 10^6/UL (ref 3.5–6.1)
TIBC SERPL-MCNC: 190 UG/DL (ref 265–497)
VIT B12 SERPL-MCNC: 821 PG/ML (ref 239–931)
WBC # BLD AUTO: 15.7 10^3/UL (ref 4.5–11)

## 2018-04-27 RX ADMIN — INSULIN HUMAN SCH: 100 INJECTION, SOLUTION PARENTERAL at 08:09

## 2018-04-27 RX ADMIN — AZTREONAM SCH MLS/HR: 1 INJECTION, SOLUTION INTRAVENOUS at 05:06

## 2018-04-27 RX ADMIN — INSULIN HUMAN SCH: 100 INJECTION, SOLUTION PARENTERAL at 11:11

## 2018-04-27 RX ADMIN — VANCOMYCIN HYDROCHLORIDE SCH MLS/HR: 1 INJECTION, POWDER, LYOPHILIZED, FOR SOLUTION INTRAVENOUS at 10:16

## 2018-04-27 RX ADMIN — AZTREONAM SCH MLS/HR: 1 INJECTION, SOLUTION INTRAVENOUS at 14:17

## 2018-04-27 RX ADMIN — METRONIDAZOLE SCH MLS/HR: 500 INJECTION, SOLUTION INTRAVENOUS at 22:22

## 2018-04-27 RX ADMIN — METRONIDAZOLE SCH MLS/HR: 500 INJECTION, SOLUTION INTRAVENOUS at 06:10

## 2018-04-27 RX ADMIN — INSULIN HUMAN SCH UNITS: 100 INJECTION, SOLUTION PARENTERAL at 16:48

## 2018-04-27 RX ADMIN — METRONIDAZOLE SCH MLS/HR: 500 INJECTION, SOLUTION INTRAVENOUS at 14:17

## 2018-04-27 RX ADMIN — VANCOMYCIN HYDROCHLORIDE SCH MLS/HR: 1 INJECTION, POWDER, LYOPHILIZED, FOR SOLUTION INTRAVENOUS at 00:06

## 2018-04-27 RX ADMIN — AZTREONAM SCH MLS/HR: 1 INJECTION, SOLUTION INTRAVENOUS at 22:21

## 2018-04-27 NOTE — CP.PCM.PN
Subjective





- Date & Time of Evaluation


Date of Evaluation: 04/27/18


Time of Evaluation: 09:27





- Subjective


Subjective: 





Surgery: Dr. Mora





Pt seen and examined. Pt has no complaints of abd pain but she does have nausea 

and intermittent vomiting. No BM.





Objective





- Vital Signs/Intake and Output


Vital Signs (last 24 hours): 


 











Temp Pulse Resp BP Pulse Ox


 


 98.1 F   114 H  20   150/90   98 


 


 04/27/18 08:51  04/26/18 16:00  04/27/18 08:51  04/27/18 08:51  04/27/18 08:51








Intake and Output: 


 











 04/27/18 04/27/18





 06:59 18:59


 


Intake Total 960 


 


Balance 960 














- Medications


Medications: 


 Current Medications





Fentanyl (Duragesic)  1 patch TD Q72H [facility]


   Last Admin: 04/26/18 11:00 Dose:  1 patch


Hydromorphone HCl (Dilaudid)  2 mg PO Q4 PRN


   PRN Reason: Pain, moderate (4-7)


   Last Admin: 04/25/18 21:40 Dose:  2 mg


Hydromorphone HCl (Dilaudid)  2 mg IVP Q4H PRN


   PRN Reason: Pain, severe (8-10)


Aztreonam (Azactam 1 Gm)  100 mls @ 100 mls/hr IVPB Q8 [facility]


   PRN Reason: Protocol


   Stop: 05/04/18 06:01


   Last Admin: 04/27/18 05:06 Dose:  100 mls/hr


Metronidazole (Flagyl)  500 mg in 100 mls @ 100 mls/hr IVPB Q8 [facility]


   PRN Reason: Protocol


   Stop: 05/04/18 06:01


   Last Admin: 04/27/18 06:10 Dose:  100 mls/hr


Vancomycin HCl (Vancomycin 1gm)  1 gm in 250 mls @ 167 mls/hr IVPB Q12H [facility]


   PRN Reason: Protocol


   Stop: 05/03/18 23:16


   Last Admin: 04/27/18 00:06 Dose:  167 mls/hr


Sodium Chloride (Sodium Chloride 0.9%)  1,000 mls @ 80 mls/hr IV .[id number] [facility]


   Last Admin: 04/26/18 21:23 Dose:  80 mls/hr


Insulin Human Regular (Humulin R Low)  0 units SC ACHS [facility]


   PRN Reason: Protocol


   Last Admin: 04/27/18 08:09 Dose:  Not Given


Ondansetron HCl (Zofran Inj)  8 mg IVP Q6H [facility]


   Last Admin: 04/27/18 03:21 Dose:  8 mg











- Labs


Labs: 


 





 04/27/18 06:45 





 04/26/18 06:45 





 











PT  13.2 SECONDS (9.4-12.5)  H  04/24/18  15:36    


 


INR  1.15  (0.93-1.08)  H  04/24/18  15:36    


 


APTT  25.8 Seconds (25.1-36.5)   04/24/18  15:36    














- Constitutional


Appears: Non-toxic, No Acute Distress





- Head Exam


Head Exam: ATRAUMATIC, NORMOCEPHALIC





- Eye Exam


Eye Exam: EOMI





- ENT Exam


ENT Exam: Mucous Membranes Moist





- Neck Exam


Neck Exam: Full ROM





- Respiratory Exam


Respiratory Exam: NORMAL BREATHING PATTERN.  absent: Accessory Muscle Use, 

Respiratory Distress





- GI/Abdominal Exam


GI & Abdominal Exam: Soft.  absent: Distended, Firm, Guarding, Rigid, Tenderness

, Rebound





- Extremities Exam


Extremities Exam: absent: Calf Tenderness, Pedal Edema





- Neurological Exam


Neurological Exam: Alert, Awake, Oriented x3





Assessment and Plan





- Assessment and Plan (Free Text)


Assessment: 





62F w. SBO vs ileus


-keep NPO


-serial abd exams


-reattempt NGT if sxs worsen


-out pt PET and GYN f/u


-d/w attending





Zemaitis PGY3

## 2018-04-27 NOTE — PN
DATE:  04/27/2018



SUBJECTIVE:  The patient has no complaints of any chest pain.  No shortness

of breath.  She continues to have abdominal pain.  She states that the pain

is 8/10.  She states the increase in Dilaudid does help.  I also increased

her fentanyl patch yesterday.



PHYSICAL EXAMINATION:

VITAL SIGNS:  Temperature is 98.3, pulse 114, blood pressure is 166/93,

respirations 19.

GENERAL:  The patient is lying in bed, flat, comfortable.

HEENT:  No oral lesion.  Anicteric sclerae.  Moist mucosa.

NECK:  No JVD, adenopathy, or thyromegaly.

CARDIOVASCULAR:  S1 and S2, regular.  No murmurs, rubs, or gallops.

LUNGS:  Clear to auscultation bilaterally.  No wheeze, rales, or rhonchi.

ABDOMEN:  Bowel sounds are positive, soft, nontender and nondistended.

EXTREMITIES:  No cyanosis, clubbing or edema.



LABORATORY DATA:  White count of 14, hemoglobin of 8.7, creatinine 0.7.



DIAGNOSTIC DATA:  Abdominal x-ray shows multiple moderately dilated loops

of small bowel in the mid abdomen.



ASSESSMENT:

1.  Small bowel obstruction/ileus.

2.  Ovarian neoplasm.

3.  Acute on chronic pain, uncontrolled.

4.  Hypertension.

5.  Diabetes type 2.

6.  Cholelithiasis.

7.  Leukocytosis.



PLAN:  The patient had blood cultures and urine cultures that have been

negative.  She has probable ileus.  She is being followed by Surgery. 

Patient is currently n.p.o.  She is receiving IV fluids.  The patient is on

vancomycin for antibiotics.  She is on metformin for her diabetes.  I will

hold her metformin because she is not able to take p.o.  The patient is on

fingersticks.  She is on _____ for antibiotics because of her PENICILLIN

ALLERGY.  She is on Zofran as needed for nausea.  We will continue to

follow the patient closely.  She currently is a full code.





__________________________________________

Javan Goldsmith MD



DD:  04/27/2018 6:23:12

DT:  04/27/2018 8:31:10

Job # [id number]

## 2018-04-27 NOTE — CP.PCM.PN
Subjective





- Date & Time of Evaluation


Date of Evaluation: 04/27/18


Time of Evaluation: 11:30





- Subjective


Subjective: 





Abdominal pain is a little better, but still with nausea, still with no appetite

, still had vomiting yesterday.





Objective





- Vital Signs/Intake and Output


Vital Signs (last 24 hours): 


 











Temp Pulse Resp BP Pulse Ox


 


 98.1 F   114 H  20   150/90   98 


 


 04/27/18 08:51  04/26/18 16:00  04/27/18 08:51  04/27/18 08:51  04/27/18 08:51








Intake and Output: 


 











 04/27/18 04/27/18





 06:59 18:59


 


Intake Total 960 


 


Balance 960 














- Medications


Medications: 


 Current Medications





Fentanyl (Duragesic)  1 patch TD Q72H [facility]


   Last Admin: 04/26/18 11:00 Dose:  1 patch


Hydromorphone HCl (Dilaudid)  2 mg PO Q4 PRN


   PRN Reason: Pain, moderate (4-7)


   Last Admin: 04/25/18 21:40 Dose:  2 mg


Hydromorphone HCl (Dilaudid)  2 mg IVP Q4H [facility]


   PRN Reason: Pain, severe (8-10)


Aztreonam (Azactam 1 Gm)  100 mls @ 100 mls/hr IVPB Q8 [facility]


   PRN Reason: Protocol


   Stop: 05/04/18 06:01


   Last Admin: 04/27/18 05:06 Dose:  100 mls/hr


Metronidazole (Flagyl)  500 mg in 100 mls @ 100 mls/hr IVPB Q8 [facility]


   PRN Reason: Protocol


   Stop: 05/04/18 06:01


   Last Admin: 04/27/18 06:10 Dose:  100 mls/hr


Vancomycin HCl (Vancomycin 1gm)  1 gm in 250 mls @ 167 mls/hr IVPB Q12H [facility]


   PRN Reason: Protocol


   Stop: 05/03/18 23:16


   Last Admin: 04/27/18 00:06 Dose:  167 mls/hr


Sodium Chloride (Sodium Chloride 0.9%)  1,000 mls @ 80 mls/hr IV .[id number] [facility]


   Last Admin: 04/26/18 21:23 Dose:  80 mls/hr


Insulin Human Regular (Humulin R Low)  0 units SC ACHS [facility]


   PRN Reason: Protocol


   Last Admin: 04/27/18 08:09 Dose:  Not Given


Ondansetron HCl (Zofran Inj)  8 mg IVP Q6H [facility]


   Last Admin: 04/27/18 03:21 Dose:  8 mg











- Labs


Labs: 


 





 04/27/18 06:45 





 04/26/18 06:45 





 











PT  13.2 SECONDS (9.4-12.5)  H  04/24/18  15:36    


 


INR  1.15  (0.93-1.08)  H  04/24/18  15:36    


 


APTT  25.8 Seconds (25.1-36.5)   04/24/18  15:36    














- Constitutional


Appears: Chronically Ill





- Head Exam


Head Exam: NORMAL INSPECTION





- ENT Exam


ENT Exam: Mucous Membranes Moist





- Neck Exam


Neck Exam: absent: Meningismus





- Respiratory Exam


Respiratory Exam: Decreased Breath Sounds





- Cardiovascular Exam


Cardiovascular Exam: +S1, +S2





- GI/Abdominal Exam


GI & Abdominal Exam: Soft.  absent: Tenderness





Assessment and Plan





- Assessment and Plan (Free Text)


Plan: 





Assessment


Systemic inflammatory response syndrome, consider due to ovarian cancer and 

small bowel obstruction or ileus R/O sepsis from intra-abdominal infection


DM


HTN


obesity with BMI 37





Plan


continue Vancomycin, Azactam and Flagyl day 3; cultures have been negative; 

reviewed CT A/P showing ovarian mass


will continue to monitor clinically and follow up further plans of GI


overall prognosis is poor

## 2018-04-28 RX ADMIN — METRONIDAZOLE SCH MLS/HR: 500 INJECTION, SOLUTION INTRAVENOUS at 21:06

## 2018-04-28 RX ADMIN — METRONIDAZOLE SCH MLS/HR: 500 INJECTION, SOLUTION INTRAVENOUS at 05:03

## 2018-04-28 RX ADMIN — VANCOMYCIN HYDROCHLORIDE SCH MLS/HR: 1 INJECTION, POWDER, LYOPHILIZED, FOR SOLUTION INTRAVENOUS at 01:55

## 2018-04-28 RX ADMIN — INSULIN HUMAN SCH: 100 INJECTION, SOLUTION PARENTERAL at 16:08

## 2018-04-28 RX ADMIN — AZTREONAM SCH MLS/HR: 1 INJECTION, SOLUTION INTRAVENOUS at 05:03

## 2018-04-28 RX ADMIN — METRONIDAZOLE SCH MLS/HR: 500 INJECTION, SOLUTION INTRAVENOUS at 15:03

## 2018-04-28 RX ADMIN — AZTREONAM SCH MLS/HR: 1 INJECTION, SOLUTION INTRAVENOUS at 21:05

## 2018-04-28 RX ADMIN — VANCOMYCIN HYDROCHLORIDE SCH MLS/HR: 1 INJECTION, POWDER, LYOPHILIZED, FOR SOLUTION INTRAVENOUS at 23:14

## 2018-04-28 RX ADMIN — VANCOMYCIN HYDROCHLORIDE SCH MLS/HR: 1 INJECTION, POWDER, LYOPHILIZED, FOR SOLUTION INTRAVENOUS at 10:45

## 2018-04-28 RX ADMIN — ENOXAPARIN SODIUM SCH MG: 40 INJECTION SUBCUTANEOUS at 10:42

## 2018-04-28 RX ADMIN — INSULIN HUMAN SCH: 100 INJECTION, SOLUTION PARENTERAL at 23:16

## 2018-04-28 RX ADMIN — INSULIN HUMAN SCH UNITS: 100 INJECTION, SOLUTION PARENTERAL at 08:39

## 2018-04-28 RX ADMIN — INSULIN HUMAN SCH: 100 INJECTION, SOLUTION PARENTERAL at 00:58

## 2018-04-28 RX ADMIN — AZTREONAM SCH MLS/HR: 1 INJECTION, SOLUTION INTRAVENOUS at 13:43

## 2018-04-28 RX ADMIN — INSULIN HUMAN SCH: 100 INJECTION, SOLUTION PARENTERAL at 12:35

## 2018-04-28 NOTE — CP.PCM.PN
Subjective





- Date & Time of Evaluation


Date of Evaluation: 04/28/18


Time of Evaluation: 11:30





- Subjective


Subjective: 





Abdominal pain is improving, but still no appetite, had soft BM today, no 

fevers.





Objective





- Vital Signs/Intake and Output


Vital Signs (last 24 hours): 


 











Temp Pulse Resp BP Pulse Ox


 


 98.5 F   91 H  19   153/89 H  96 


 


 04/27/18 16:00  04/27/18 16:00  04/27/18 16:00  04/27/18 16:00  04/27/18 16:00








Intake and Output: 


 











 04/28/18 04/28/18





 06:59 18:59


 


Intake Total 100 


 


Balance 100 














- Medications


Medications: 


 Current Medications





Enoxaparin Sodium (Lovenox)  40 mg SC DAILY [facility]


   PRN Reason: Protocol


   Last Admin: 04/28/18 10:42 Dose:  40 mg


Fentanyl (Duragesic)  1 patch TD Q72H [facility]


   Last Admin: 04/26/18 11:00 Dose:  1 patch


Hydromorphone HCl (Dilaudid)  2 mg PO Q4 PRN


   PRN Reason: Pain, moderate (4-7)


   Last Admin: 04/25/18 21:40 Dose:  2 mg


Hydromorphone HCl (Dilaudid)  2 mg IVP Q4H [facility]


   PRN Reason: Pain, severe (8-10)


Aztreonam (Azactam 1 Gm)  100 mls @ 100 mls/hr IVPB Q8 [facility]


   PRN Reason: Protocol


   Stop: 05/04/18 06:01


   Last Admin: 04/28/18 05:03 Dose:  100 mls/hr


Metronidazole (Flagyl)  500 mg in 100 mls @ 100 mls/hr IVPB Q8 [facility]


   PRN Reason: Protocol


   Stop: 05/04/18 06:01


   Last Admin: 04/28/18 05:03 Dose:  100 mls/hr


Vancomycin HCl (Vancomycin 1gm)  1 gm in 250 mls @ 167 mls/hr IVPB Q12H [facility]


   PRN Reason: Protocol


   Stop: 05/03/18 23:16


   Last Admin: 04/28/18 10:45 Dose:  167 mls/hr


Sodium Chloride (Sodium Chloride 0.9%)  1,000 mls @ 80 mls/hr IV .[id number] [facility]


   Last Admin: 04/27/18 10:19 Dose:  80 mls/hr


Insulin Human Regular (Humulin R Low)  0 units SC ACHS [facility]


   PRN Reason: Protocol


   Last Admin: 04/28/18 08:39 Dose:  1 units


Ondansetron HCl (Zofran Inj)  8 mg IVP Q6H [facility]


   Last Admin: 04/28/18 10:43 Dose:  8 mg











- Labs


Labs: 


 





 04/27/18 06:45 





 04/26/18 06:45 





 











PT  13.2 SECONDS (9.4-12.5)  H  04/24/18  15:36    


 


INR  1.15  (0.93-1.08)  H  04/24/18  15:36    


 


APTT  25.8 Seconds (25.1-36.5)   04/24/18  15:36    














- Constitutional


Appears: Chronically Ill





- Head Exam


Head Exam: NORMAL INSPECTION





- Neck Exam


Neck Exam: absent: Meningismus





- Respiratory Exam


Respiratory Exam: Decreased Breath Sounds





- Cardiovascular Exam


Cardiovascular Exam: +S1, +S2





- GI/Abdominal Exam


GI & Abdominal Exam: Soft.  absent: Tenderness





Assessment and Plan





- Assessment and Plan (Free Text)


Plan: 





Assessment


Systemic inflammatory response syndrome, consider due to ovarian cancer and 

small bowel obstruction or ileus R/O sepsis from intra-abdominal infection


DM


HTN


obesity with BMI 37





Plan


continue Vancomycin, Azactam and Flagyl day 4; cultures have been negative; 

reviewed CT A/P showing ovarian mass and possible SBO or ileus


will continue to monitor clinically and follow up further plans of GI and plans 

for advancement of her diet


overall prognosis is poor

## 2018-04-28 NOTE — CP.PCM.PN
Subjective





- Date & Time of Evaluation


Date of Evaluation: 04/28/18


Time of Evaluation: 08:03





- Subjective


Subjective: 





Surgery: Dr. Mora





Pt seen and examined. She has no complaints of abd pain. She states that she 

feels like she needs to have BM but has been unable to. Appetite remains poor. 

She states that she would like to try [facility].





Objective





- Vital Signs/Intake and Output


Vital Signs (last 24 hours): 


 











Temp Pulse Resp BP Pulse Ox


 


 98.5 F   91 H  19   153/89 H  96 


 


 04/27/18 16:00  04/27/18 16:00  04/27/18 16:00  04/27/18 16:00  04/27/18 16:00








Intake and Output: 


 











 04/28/18 04/28/18





 06:59 18:59


 


Intake Total 100 


 


Balance 100 














- Medications


Medications: 


 Current Medications





Enoxaparin Sodium (Lovenox)  40 mg SC DAILY [facility]


   PRN Reason: Protocol


Fentanyl (Duragesic)  1 patch TD Q72H [facility]


   Last Admin: 04/26/18 11:00 Dose:  1 patch


Hydromorphone HCl (Dilaudid)  2 mg PO Q4 PRN


   PRN Reason: Pain, moderate (4-7)


   Last Admin: 04/25/18 21:40 Dose:  2 mg


Hydromorphone HCl (Dilaudid)  2 mg IVP Q4H [facility]


   PRN Reason: Pain, severe (8-10)


Aztreonam (Azactam 1 Gm)  100 mls @ 100 mls/hr IVPB Q8 [facility]


   PRN Reason: Protocol


   Stop: 05/04/18 06:01


   Last Admin: 04/28/18 05:03 Dose:  100 mls/hr


Metronidazole (Flagyl)  500 mg in 100 mls @ 100 mls/hr IVPB Q8 [facility]


   PRN Reason: Protocol


   Stop: 05/04/18 06:01


   Last Admin: 04/28/18 05:03 Dose:  100 mls/hr


Vancomycin HCl (Vancomycin 1gm)  1 gm in 250 mls @ 167 mls/hr IVPB Q12H [facility]


   PRN Reason: Protocol


   Stop: 05/03/18 23:16


   Last Admin: 04/28/18 01:55 Dose:  167 mls/hr


Sodium Chloride (Sodium Chloride 0.9%)  1,000 mls @ 80 mls/hr IV .[id number] [facility]


   Last Admin: 04/27/18 10:19 Dose:  80 mls/hr


Insulin Human Regular (Humulin R Low)  0 units SC ACHS [facility]


   PRN Reason: Protocol


   Last Admin: 04/28/18 00:58 Dose:  Not Given


Ondansetron HCl (Zofran Inj)  8 mg IVP Q6H [facility]


   Last Admin: 04/28/18 05:54 Dose:  8 mg











- Labs


Labs: 


 





 04/27/18 06:45 





 04/26/18 06:45 





 











PT  13.2 SECONDS (9.4-12.5)  H  04/24/18  15:36    


 


INR  1.15  (0.93-1.08)  H  04/24/18  15:36    


 


APTT  25.8 Seconds (25.1-36.5)   04/24/18  15:36    














- Constitutional


Appears: Non-toxic, No Acute Distress





- Head Exam


Head Exam: ATRAUMATIC, NORMOCEPHALIC





- Eye Exam


Eye Exam: EOMI





- ENT Exam


ENT Exam: Mucous Membranes Moist





- Neck Exam


Neck Exam: Full ROM





- Respiratory Exam


Respiratory Exam: NORMAL BREATHING PATTERN.  absent: Accessory Muscle Use, 

Respiratory Distress





- GI/Abdominal Exam


GI & Abdominal Exam: Distended, Soft.  absent: Firm, Guarding, Rigid, Tenderness

, Rebound





- Neurological Exam


Neurological Exam: Alert, Awake, Oriented x3





Assessment and Plan





- Assessment and Plan (Free Text)


Assessment: 





62F w. SBO vs ileus


-will start CLD


-glycerin suppository


-serial abd exams, monitor for bowel fxn


-will make npo if sxs worsen


-d/w attending





[facility] PGY3

## 2018-04-28 NOTE — PN
DATE:  04/28/2018



SUBJECTIVE:  The patient has no complaints of any chest pain.  No shortness

of breath.  She states her pain is better controlled.



PHYSICAL EXAMINATION:

VITAL SIGNS:  Temperature is 98.5, pulse of 91, blood pressure is 153/89,

respirations 19.

GENERAL:  The patient is lying in bed, flat, comfortable.

HEENT:  No oral lesion.  Anicteric sclerae.  Moist mucosa.

NECK:  No JVD, adenopathy, or thyromegaly.

CARDIOVASCULAR:  S1 and S2, regular.  No murmurs, rubs, or gallops.

LUNGS:  Clear to auscultation bilaterally.  No wheeze, rales, or rhonchi.

ABDOMEN:  Bowel sounds are positive.  Soft, nontender and nondistended.

EXTREMITIES:  No cyanosis, clubbing or edema.



ASSESSMENT:

1.  Small bowel obstruction/ileus.

2.  Ovarian neoplasm.

3.  Acute-on-chronic pain, better controlled.

4.  Hypertension.

5.  Diabetes type 2.

6.  Cholelithiasis.

7.  Leukocytosis.

8.  PENICILLIN ALLERGY.



The patient had blood cultures and urine cultures that were negative.  She

is having better control of her pain with the increased dose of the

fentanyl patch.  She is also on Dilaudid IV as well as p.o. for

breakthrough pain.  The patient is on IV fluids, receiving vancomycin and

aztreonam for antibiotics.  She currently is n.p.o.  We will continue to

follow closely.  The patient was seen by Physical Therapy.  She was advised

to go to subacute rehab.







__________________________________________

Javan Goldsmith MD



DD:  04/28/2018 6:06:35

DT:  04/28/2018 6:58:35

Job # [id number]

## 2018-04-29 VITALS — HEART RATE: 93 BPM | RESPIRATION RATE: 20 BRPM

## 2018-04-29 RX ADMIN — AZTREONAM SCH MLS/HR: 1 INJECTION, SOLUTION INTRAVENOUS at 21:24

## 2018-04-29 RX ADMIN — METRONIDAZOLE SCH MLS/HR: 500 INJECTION, SOLUTION INTRAVENOUS at 14:03

## 2018-04-29 RX ADMIN — INSULIN HUMAN SCH UNITS: 100 INJECTION, SOLUTION PARENTERAL at 11:50

## 2018-04-29 RX ADMIN — INSULIN HUMAN SCH: 100 INJECTION, SOLUTION PARENTERAL at 21:46

## 2018-04-29 RX ADMIN — INSULIN HUMAN SCH UNITS: 100 INJECTION, SOLUTION PARENTERAL at 17:07

## 2018-04-29 RX ADMIN — ENOXAPARIN SODIUM SCH MG: 40 INJECTION SUBCUTANEOUS at 09:06

## 2018-04-29 RX ADMIN — VANCOMYCIN HYDROCHLORIDE SCH MLS/HR: 1 INJECTION, POWDER, LYOPHILIZED, FOR SOLUTION INTRAVENOUS at 10:44

## 2018-04-29 RX ADMIN — METRONIDAZOLE SCH MLS/HR: 500 INJECTION, SOLUTION INTRAVENOUS at 05:21

## 2018-04-29 RX ADMIN — AZTREONAM SCH MLS/HR: 1 INJECTION, SOLUTION INTRAVENOUS at 05:20

## 2018-04-29 RX ADMIN — METRONIDAZOLE SCH MLS/HR: 500 INJECTION, SOLUTION INTRAVENOUS at 21:25

## 2018-04-29 RX ADMIN — AZTREONAM SCH MLS/HR: 1 INJECTION, SOLUTION INTRAVENOUS at 14:03

## 2018-04-29 RX ADMIN — INSULIN HUMAN SCH: 100 INJECTION, SOLUTION PARENTERAL at 09:05

## 2018-04-29 NOTE — CP.PCM.PN
Subjective





- Date & Time of Evaluation


Date of Evaluation: 04/29/18


Time of Evaluation: 06:55





- Subjective


Subjective: 





General Surgery:  Dr Mora





Pt S&E.  YVETTE.  Tolerating CLD.  States no nausea or vomiting.  Denies f/c.  

Had a BM after enema yesterday.    





Objective





- Vital Signs/Intake and Output


Vital Signs (last 24 hours): 


 











Temp Pulse Resp BP Pulse Ox


 


 98.7 F   79   19   152/78 H  95 


 


 04/29/18 06:00  04/29/18 06:00  04/29/18 06:00  04/29/18 06:00  04/29/18 06:00








Intake and Output: 


 











 04/29/18 04/29/18





 06:59 18:59


 


Intake Total 1200 800


 


Balance 1200 800














- Medications


Medications: 


 Current Medications





Enoxaparin Sodium (Lovenox)  40 mg SC DAILY [facility]


   PRN Reason: Protocol


   Last Admin: 04/29/18 09:06 Dose:  40 mg


Fentanyl (Duragesic)  1 patch TD Q72H [facility]


   Last Admin: 04/29/18 09:04 Dose:  1 patch


Hydromorphone HCl (Dilaudid)  2 mg PO Q4 PRN


   PRN Reason: Pain, moderate (4-7)


   Last Admin: 04/25/18 21:40 Dose:  2 mg


Hydromorphone HCl (Dilaudid)  2 mg IVP Q4H PRN


   PRN Reason: Pain, severe (8-10)


Aztreonam (Azactam 1 Gm)  100 mls @ 100 mls/hr IVPB Q8 [facility]


   PRN Reason: Protocol


   Stop: 05/04/18 06:01


   Last Admin: 04/29/18 14:03 Dose:  100 mls/hr


Metronidazole (Flagyl)  500 mg in 100 mls @ 100 mls/hr IVPB Q8 [facility]


   PRN Reason: Protocol


   Stop: 05/04/18 06:01


   Last Admin: 04/29/18 14:03 Dose:  100 mls/hr


Vancomycin HCl (Vancomycin 1gm)  1 gm in 250 mls @ 167 mls/hr IVPB Q12H [facility]


   PRN Reason: Protocol


   Stop: 05/03/18 23:16


   Last Admin: 04/29/18 10:44 Dose:  167 mls/hr


Insulin Human Regular (Humulin R Low)  0 units SC ACHS [facility]


   PRN Reason: Protocol


   Last Admin: 04/29/18 11:50 Dose:  2 units


Ondansetron HCl (Zofran Inj)  8 mg IVP Q6H [facility]


   Last Admin: 04/29/18 10:45 Dose:  Not Given











- Labs


Labs: 


 





 04/27/18 06:45 





 04/26/18 06:45 





 











PT  13.2 SECONDS (9.4-12.5)  H  04/24/18  15:36    


 


INR  1.15  (0.93-1.08)  H  04/24/18  15:36    


 


APTT  25.8 Seconds (25.1-36.5)   04/24/18  15:36    














- Constitutional


Appears: Non-toxic, No Acute Distress





- ENT Exam


ENT Exam: Normal Exam





- Respiratory Exam


Respiratory Exam: absent: Accessory Muscle Use, Respiratory Distress





- Cardiovascular Exam


Cardiovascular Exam: REGULAR RHYTHM.  absent: Tachycardia





- GI/Abdominal Exam


GI & Abdominal Exam: Soft.  absent: Distended, Firm, Guarding, Tenderness





- Neurological Exam


Neurological Exam: Alert, Awake, Oriented x3





- Psychiatric Exam


Psychiatric exam: Normal Affect, Normal Mood





Assessment and Plan





- Assessment and Plan (Free Text)


Assessment: 





62F with ovarian mass and chronic intermittent ileus


Plan: 





ok to adv to [facility]


will cont to monitor 


no plans for surgical intervention





ayaan Francisco, PGY3

## 2018-04-29 NOTE — RAD
HISTORY:

ileus / SBO  



COMPARISON:

04/26/2018



FINDINGS:



BOWEL:

Single frontal view of the abdomen reveals persistent small bowel 

dilatation. Small bowel loops are mildly increased in overall size 

from prior study. Findings suggest persistent bowel obstruction or 

increased ileus.



BONES:

No change.



OTHER FINDINGS:

Free air cannot be excluded on this single supine radiograph.



IMPRESSION:

Mild increase in small bowel dilatation.

## 2018-04-29 NOTE — CP.PCM.PN
Subjective





- Date & Time of Evaluation


Date of Evaluation: 04/29/18


Time of Evaluation: 12:55





- Subjective


Subjective: 





Patient's diet is now advanced to liquid, no fevers, still some nausea but no 

vomiting, no abdominal pain.





Objective





- Vital Signs/Intake and Output


Vital Signs (last 24 hours): 


 











Temp Pulse Resp BP Pulse Ox


 


 98.7 F   79   19   152/78 H  95 


 


 04/29/18 06:00  04/29/18 06:00  04/29/18 06:00  04/29/18 06:00  04/29/18 06:00








Intake and Output: 


 











 04/29/18 04/29/18





 06:59 18:59


 


Intake Total 1200 


 


Balance 1200 














- Medications


Medications: 


 Current Medications





Enoxaparin Sodium (Lovenox)  40 mg SC DAILY [facility]


   PRN Reason: Protocol


   Last Admin: 04/29/18 09:06 Dose:  40 mg


Fentanyl (Duragesic)  1 patch TD Q72H [facility]


   Last Admin: 04/29/18 09:04 Dose:  1 patch


Hydromorphone HCl (Dilaudid)  2 mg PO Q4 PRN


   PRN Reason: Pain, moderate (4-7)


   Last Admin: 04/25/18 21:40 Dose:  2 mg


Hydromorphone HCl (Dilaudid)  2 mg IVP Q4H PRN


   PRN Reason: Pain, severe (8-10)


Aztreonam (Azactam 1 Gm)  100 mls @ 100 mls/hr IVPB Q8 [facility]


   PRN Reason: Protocol


   Stop: 05/04/18 06:01


   Last Admin: 04/29/18 05:20 Dose:  100 mls/hr


Metronidazole (Flagyl)  500 mg in 100 mls @ 100 mls/hr IVPB Q8 [facility]


   PRN Reason: Protocol


   Stop: 05/04/18 06:01


   Last Admin: 04/29/18 05:21 Dose:  100 mls/hr


Vancomycin HCl (Vancomycin 1gm)  1 gm in 250 mls @ 167 mls/hr IVPB Q12H [facility]


   PRN Reason: Protocol


   Stop: 05/03/18 23:16


   Last Admin: 04/28/18 23:14 Dose:  167 mls/hr


Insulin Human Regular (Humulin R Low)  0 units SC ACHS [facility]


   PRN Reason: Protocol


   Last Admin: 04/29/18 09:05 Dose:  Not Given


Ondansetron HCl (Zofran Inj)  8 mg IVP Q6H [facility]


   Last Admin: 04/29/18 02:00 Dose:  Not Given











- Labs


Labs: 


 





 04/27/18 06:45 





 04/26/18 06:45 





 











PT  13.2 SECONDS (9.4-12.5)  H  04/24/18  15:36    


 


INR  1.15  (0.93-1.08)  H  04/24/18  15:36    


 


APTT  25.8 Seconds (25.1-36.5)   04/24/18  15:36    














- Constitutional


Appears: Chronically Ill





- Head Exam


Head Exam: NORMAL INSPECTION





- ENT Exam


ENT Exam: Mucous Membranes Moist





- Neck Exam


Neck Exam: absent: Meningismus





- Respiratory Exam


Respiratory Exam: Decreased Breath Sounds





- Cardiovascular Exam


Cardiovascular Exam: +S1, +S2





- GI/Abdominal Exam


GI & Abdominal Exam: Soft.  absent: Tenderness





Assessment and Plan





- Assessment and Plan (Free Text)


Plan: 





Assessment


Systemic inflammatory response syndrome, consider due to ovarian cancer and 

small bowel obstruction or ileus R/O sepsis from intra-abdominal infection


DM


HTN


obesity with BMI 37





Plan


continue Vancomycin, Azactam and Flagyl day 5; cultures have been negative; 

reviewed CT A/P showing ovarian mass and possible SBO or ileus


will continue to monitor clinically and follow up further advancement of her 

diet


overall prognosis is poor

## 2018-04-29 NOTE — PN
DATE:  04/29/2018



SUBJECTIVE:  The patient has no complaints of any chest pain.  No shortness

of breath.  She states her abdominal pain is better.  Her pain is better

controlled.



PHYSICAL EXAMINATION:

VITAL SIGNS:  Temperature is 98.3, pulse of 95, blood pressure is 143/84,

respirations 19.

GENERAL:  The patient is lying in bed, flat, comfortable.

HEENT:  No oral lesion.  Anicteric sclerae.  Moist mucosa.

NECK:  No JVD, adenopathy, or thyromegaly.

CARDIOVASCULAR:  S1 and S2, regular.  No murmurs, rubs, or gallops.

LUNGS:  Clear to auscultation bilaterally.  No wheeze, rales, or rhonchi.

ABDOMEN:  Bowel sounds are positive, soft, nontender and nondistended.

EXTREMITIES:  No cyanosis, clubbing or edema.



LABORATORY DATA:  White count of 15.7, hemoglobin 9.9.  Creatinine is 0.7.



ASSESSMENT:

1.  Small bowel obstruction/ileus, improved.

2.  Ovarian neoplasm.

3.  Acute on chronic pain, improving.

4.  Hypertension.

5.  Diabetes type 2.

6.  Cholelithiasis.

7.  Leukocytosis.

8.  Penicillin allergy.



PLAN:  The patient has blood cultures, urine cultures that have been

negative.  She is on aztreonam for her antibiotic, this will be continue. 

She is going to continue with Dilaudid for pain.  She is on a fentanyl

patch that has helped her pain control.  The patient is receiving Flagyl. 

She is also receiving vancomycin for her antibiotics.  She is on Lovenox

for DVT prophylaxis.  She is on insulin for coverage.  The patient is on a

liquid diet.  Her sugars have been elevated.  We will see if the patient

qualifies for the Transitional Care Unit.





__________________________________________

Javan Goldsmith MD





DD:  04/29/2018 6:21:59

DT:  04/29/2018 8:24:06

Job # [id number]

## 2018-04-29 NOTE — CP.PCM.PN
Subjective





- Date & Time of Evaluation


Date of Evaluation: 04/26/18


Time of Evaluation: 18:00





- Subjective


Subjective: 





Complaining of abdominal pain. It has slightly improved since admission. 


XRay abdomen showed dilated bowel loops consistent with SBO. 


Nausea, vomiting. 


She is NPO. No chest pain. No bleeding. 





Objective





- Vital Signs/Intake and Output


Vital Signs (last 24 hours): 


 











Temp Pulse Resp BP Pulse Ox


 


 98.3 F   95 H  19   143/84   98 


 


 04/28/18 16:50  04/28/18 16:50  04/28/18 16:50  04/28/18 16:50  04/28/18 16:50








Intake and Output: 


 











 04/28/18 04/29/18





 18:59 06:59


 


Intake Total  0


 


Balance  0














- Medications


Medications: 


 Current Medications





Enoxaparin Sodium (Lovenox)  40 mg SC DAILY [facility]


   PRN Reason: Protocol


   Last Admin: 04/28/18 10:42 Dose:  40 mg


Fentanyl (Duragesic)  1 patch TD Q72H [facility]


   Last Admin: 04/26/18 11:00 Dose:  1 patch


Hydromorphone HCl (Dilaudid)  2 mg PO Q4 PRN


   PRN Reason: Pain, moderate (4-7)


   Last Admin: 04/25/18 21:40 Dose:  2 mg


Hydromorphone HCl (Dilaudid)  2 mg IVP Q4H PRN


   PRN Reason: Pain, severe (8-10)


Aztreonam (Azactam 1 Gm)  100 mls @ 100 mls/hr IVPB Q8 [facility]


   PRN Reason: Protocol


   Stop: 05/04/18 06:01


   Last Admin: 04/28/18 21:05 Dose:  100 mls/hr


Metronidazole (Flagyl)  500 mg in 100 mls @ 100 mls/hr IVPB Q8 [facility]


   PRN Reason: Protocol


   Stop: 05/04/18 06:01


   Last Admin: 04/28/18 21:06 Dose:  100 mls/hr


Vancomycin HCl (Vancomycin 1gm)  1 gm in 250 mls @ 167 mls/hr IVPB Q12H [facility]


   PRN Reason: Protocol


   Stop: 05/03/18 23:16


   Last Admin: 04/28/18 23:14 Dose:  167 mls/hr


Sodium Chloride (Sodium Chloride 0.9%)  1,000 mls @ 80 mls/hr IV .[id number] [facility]


   Last Admin: 04/27/18 10:19 Dose:  80 mls/hr


Insulin Human Regular (Humulin R Low)  0 units SC ACHS [facility]


   PRN Reason: Protocol


   Last Admin: 04/28/18 23:16 Dose:  Not Given


Ondansetron HCl (Zofran Inj)  8 mg IVP Q6H [facility]


   Last Admin: 04/28/18 20:15 Dose:  Not Given











- Labs


Labs: 


 





 04/27/18 06:45 





 04/26/18 06:45 





 











PT  13.2 SECONDS (9.4-12.5)  H  04/24/18  15:36    


 


INR  1.15  (0.93-1.08)  H  04/24/18  15:36    


 


APTT  25.8 Seconds (25.1-36.5)   04/24/18  15:36    














- Constitutional


Appears: Chronically Ill





- Head Exam


Head Exam: ATRAUMATIC, NORMAL INSPECTION, NORMOCEPHALIC





- Eye Exam


Pupil Exam: NORMAL ACCOMODATION





- Neck Exam


Neck Exam: Normal Inspection





- Respiratory Exam


Respiratory Exam: NORMAL BREATHING PATTERN





- GI/Abdominal Exam


GI & Abdominal Exam: Soft, Normal Bowel Sounds





- Extremities Exam


Extremities Exam: Normal Inspection





- Back Exam


Back Exam: NORMAL INSPECTION





- Neurological Exam


Neurological Exam: Alert, Oriented x3





- Psychiatric Exam


Psychiatric exam: Normal Affect





- Skin


Skin Exam: Normal Color, Warm





Assessment and Plan





- Assessment and Plan (Free Text)


Assessment: 





1. Malignant mass in abdomen :  likely ovarian.  Gynec Onc eval upon discharge 

at [facility]. . CT PET , if no metastatic disease she will be 

a candidate for debulking surgery. 





2. Nausea/vomiting : IV zofran 8 mg IV Q 8hr scheduled. She is NPO. 


SBO , likley due to mass, or illeus 





3. Pain : fentanyl patch increased to 50. On IV dilaudid prn. 





4. Renal : stable. 





5. Mild ascites. 





Thank you Dr. Goldsmith for allowing us to participate in her care.

## 2018-04-30 VITALS — SYSTOLIC BLOOD PRESSURE: 150 MMHG | OXYGEN SATURATION: 94 % | TEMPERATURE: 98.5 F | DIASTOLIC BLOOD PRESSURE: 76 MMHG

## 2018-04-30 LAB
ALBUMIN SERPL-MCNC: 2.8 G/DL (ref 3–4.8)
ALBUMIN/GLOB SERPL: 1 {RATIO} (ref 1.1–1.8)
ALT SERPL-CCNC: 25 U/L (ref 7–56)
AST SERPL-CCNC: 20 U/L (ref 14–36)
BASOPHILS # BLD AUTO: 0.01 K/MM3 (ref 0–2)
BASOPHILS NFR BLD: 0.1 % (ref 0–3)
BUN SERPL-MCNC: 12 MG/DL (ref 7–21)
CALCIUM SERPL-MCNC: 8 MG/DL (ref 8.4–10.5)
EOSINOPHIL # BLD: 0.3 10*3/UL (ref 0–0.7)
EOSINOPHIL NFR BLD: 3.2 % (ref 1.5–5)
ERYTHROCYTE [DISTWIDTH] IN BLOOD BY AUTOMATED COUNT: 13.9 % (ref 11.5–14.5)
GFR NON-AFRICAN AMERICAN: > 60
GRANULOCYTES # BLD: 6.73 10*3/UL (ref 1.4–6.5)
GRANULOCYTES NFR BLD: 74.4 % (ref 50–68)
HGB BLD-MCNC: 8.7 G/DL (ref 12–16)
LYMPHOCYTES # BLD: 1 10*3/UL (ref 1.2–3.4)
LYMPHOCYTES NFR BLD AUTO: 11.3 % (ref 22–35)
MCH RBC QN AUTO: 28.9 PG (ref 25–35)
MCHC RBC AUTO-ENTMCNC: 31.9 G/DL (ref 31–37)
MCV RBC AUTO: 90.7 FL (ref 80–105)
MONOCYTES # BLD AUTO: 1 10*3/UL (ref 0.1–0.6)
MONOCYTES NFR BLD: 11 % (ref 1–6)
PLATELET # BLD: 307 10^3/UL (ref 120–450)
PMV BLD AUTO: 8.3 FL (ref 7–11)
RBC # BLD AUTO: 3.01 10^6/UL (ref 3.5–6.1)
WBC # BLD AUTO: 9 10^3/UL (ref 4.5–11)

## 2018-04-30 RX ADMIN — VANCOMYCIN HYDROCHLORIDE SCH MLS/HR: 1 INJECTION, POWDER, LYOPHILIZED, FOR SOLUTION INTRAVENOUS at 12:28

## 2018-04-30 RX ADMIN — ENOXAPARIN SODIUM SCH MG: 40 INJECTION SUBCUTANEOUS at 10:31

## 2018-04-30 RX ADMIN — AZTREONAM SCH MLS/HR: 1 INJECTION, SOLUTION INTRAVENOUS at 05:03

## 2018-04-30 RX ADMIN — INSULIN HUMAN SCH UNITS: 100 INJECTION, SOLUTION PARENTERAL at 12:27

## 2018-04-30 RX ADMIN — METRONIDAZOLE SCH MLS/HR: 500 INJECTION, SOLUTION INTRAVENOUS at 05:03

## 2018-04-30 RX ADMIN — VANCOMYCIN HYDROCHLORIDE SCH MLS/HR: 1 INJECTION, POWDER, LYOPHILIZED, FOR SOLUTION INTRAVENOUS at 00:00

## 2018-04-30 RX ADMIN — INSULIN HUMAN SCH: 100 INJECTION, SOLUTION PARENTERAL at 08:13

## 2018-04-30 NOTE — RAD
HISTORY:

reeval ileus  



COMPARISON:

04/29/2018



FINDINGS:



BOWEL:

Moderately dilated small bowel loops are seen in the mid abdomen 

consistent with ileus or partial obstruction. The findings have shown 

slight improvement 



BONES:

Normal.



OTHER FINDINGS:

None.



IMPRESSION:

Moderately dilated small bowel loops are seen in the mid abdomen 

consistent with ileus or partial obstruction. The findings have shown 

slight improvement

## 2018-04-30 NOTE — CP.PCM.PN
Subjective





- Date & Time of Evaluation


Date of Evaluation: 04/30/18


Time of Evaluation: 08:51





- Subjective


Subjective: 





Patient seen and examined at bedside. Per nursing no acute events occurred 

overnight. Patient is tolerating diet with no complaints. Patient denies any 

fevers, chills, nausea, changes in vision, headache, abdominal pain, or any 

other complaints.





Objective





- Vital Signs/Intake and Output


Vital Signs (last 24 hours): 


 











Temp Pulse Resp BP Pulse Ox


 


 98.5 F   93 H  20   150/76   94 L


 


 04/30/18 06:00  04/30/18 06:00  04/30/18 06:00  04/30/18 06:00  04/30/18 06:00








Intake and Output: 


 











 04/30/18 04/30/18





 06:59 18:59


 


Intake Total 1190 


 


Balance 1190 














- Medications


Medications: 


 Current Medications





Enoxaparin Sodium (Lovenox)  40 mg SC DAILY [facility]


   PRN Reason: Protocol


   Last Admin: 04/29/18 09:06 Dose:  40 mg


Fentanyl (Duragesic)  1 patch TD Q72H [facility]


   Last Admin: 04/29/18 09:04 Dose:  1 patch


Hydromorphone HCl (Dilaudid)  2 mg PO Q4 PRN


   PRN Reason: Pain, moderate (4-7)


   Last Admin: 04/25/18 21:40 Dose:  2 mg


Hydromorphone HCl (Dilaudid)  2 mg IVP Q4H PRN


   PRN Reason: Pain, severe (8-10)


   Last Admin: 04/30/18 08:04 Dose:  2 mg


Aztreonam (Azactam 1 Gm)  100 mls @ 100 mls/hr IVPB Q8 [facility]


   PRN Reason: Protocol


   Stop: 05/04/18 06:01


   Last Admin: 04/30/18 05:03 Dose:  100 mls/hr


Metronidazole (Flagyl)  500 mg in 100 mls @ 100 mls/hr IVPB Q8 [facility]


   PRN Reason: Protocol


   Stop: 05/04/18 06:01


   Last Admin: 04/30/18 05:03 Dose:  100 mls/hr


Vancomycin HCl (Vancomycin 1gm)  1 gm in 250 mls @ 167 mls/hr IVPB Q12H [facility]


   PRN Reason: Protocol


   Stop: 05/03/18 23:16


   Last Admin: 04/30/18 00:00 Dose:  167 mls/hr


Insulin Human Regular (Humulin R Low)  0 units SC ACHS [facility]


   PRN Reason: Protocol


   Last Admin: 04/30/18 08:13 Dose:  Not Given


Ondansetron HCl (Zofran Inj)  8 mg IVP Q6H [facility]


   Last Admin: 04/30/18 08:15 Dose:  Not Given











- Labs


Labs: 


 





 04/27/18 06:45 





 04/26/18 06:45 





 











PT  13.2 SECONDS (9.4-12.5)  H  04/24/18  15:36    


 


INR  1.15  (0.93-1.08)  H  04/24/18  15:36    


 


APTT  25.8 Seconds (25.1-36.5)   04/24/18  15:36    














- Head Exam


Head Exam: ATRAUMATIC, NORMAL INSPECTION, NORMOCEPHALIC





- Eye Exam


Eye Exam: EOMI





- ENT Exam


ENT Exam: Mucous Membranes Moist





- Respiratory Exam


Respiratory Exam: NORMAL BREATHING PATTERN





- Cardiovascular Exam


Cardiovascular Exam: REGULAR RHYTHM





- GI/Abdominal Exam


GI & Abdominal Exam: Normal Bowel Sounds





- Extremities Exam


Extremities Exam: Full ROM





- Back Exam


Back Exam: NORMAL INSPECTION





- Neurological Exam


Neurological Exam: Alert, Awake





- Psychiatric Exam


Psychiatric exam: Normal Affect, Normal Mood





- Skin


Skin Exam: Dry





Assessment and Plan





- Assessment and Plan (Free Text)


Assessment: 





62F with ovarian mass and chronic intermittent ileus


Plan: 








-will continue to advance diet as tolerated.


-will continue to monitor 


no plans for surgical intervention





Will discuss with Dr Mora

## 2018-04-30 NOTE — CP.PCM.PN
Subjective





- Date & Time of Evaluation


Date of Evaluation: 04/30/18


Time of Evaluation: 11:30





- Subjective


Subjective: 





No abdominal pain, feeling better, eating better, no diarrhea, no fevers.





Objective





- Vital Signs/Intake and Output


Vital Signs (last 24 hours): 


 











Temp Pulse Resp BP Pulse Ox


 


 98.5 F   93 H  20   150/76   94 L


 


 04/30/18 06:00  04/30/18 06:00  04/30/18 06:00  04/30/18 06:00  04/30/18 06:00








Intake and Output: 


 











 04/30/18 04/30/18





 06:59 18:59


 


Intake Total 1190 


 


Balance 1190 














- Medications


Medications: 


 Current Medications





Enoxaparin Sodium (Lovenox)  40 mg SC DAILY [facility]


   PRN Reason: Protocol


   Last Admin: 04/29/18 09:06 Dose:  40 mg


Fentanyl (Duragesic)  1 patch TD Q72H [facility]


   Last Admin: 04/29/18 09:04 Dose:  1 patch


Hydromorphone HCl (Dilaudid)  2 mg PO Q4 PRN


   PRN Reason: Pain, moderate (4-7)


   Last Admin: 04/25/18 21:40 Dose:  2 mg


Hydromorphone HCl (Dilaudid)  2 mg IVP Q4H PRN


   PRN Reason: Pain, severe (8-10)


   Last Admin: 04/30/18 08:04 Dose:  2 mg


Aztreonam (Azactam 1 Gm)  100 mls @ 100 mls/hr IVPB Q8 [facility]


   PRN Reason: Protocol


   Stop: 05/04/18 06:01


   Last Admin: 04/30/18 05:03 Dose:  100 mls/hr


Metronidazole (Flagyl)  500 mg in 100 mls @ 100 mls/hr IVPB Q8 [facility]


   PRN Reason: Protocol


   Stop: 05/04/18 06:01


   Last Admin: 04/30/18 05:03 Dose:  100 mls/hr


Vancomycin HCl (Vancomycin 1gm)  1 gm in 250 mls @ 167 mls/hr IVPB Q12H [facility]


   PRN Reason: Protocol


   Stop: 05/03/18 23:16


   Last Admin: 04/30/18 00:00 Dose:  167 mls/hr


Insulin Human Regular (Humulin R Low)  0 units SC ACHS [facility]


   PRN Reason: Protocol


   Last Admin: 04/30/18 08:13 Dose:  Not Given


Ondansetron HCl (Zofran Inj)  8 mg IVP Q6H [facility]


   Last Admin: 04/30/18 08:15 Dose:  Not Given











- Labs


Labs: 


 





 04/27/18 06:45 





 04/26/18 06:45 





 











PT  13.2 SECONDS (9.4-12.5)  H  04/24/18  15:36    


 


INR  1.15  (0.93-1.08)  H  04/24/18  15:36    


 


APTT  25.8 Seconds (25.1-36.5)   04/24/18  15:36    














- Constitutional


Appears: Chronically Ill





- Head Exam


Head Exam: NORMAL INSPECTION





- ENT Exam


ENT Exam: Mucous Membranes Moist





- Neck Exam


Neck Exam: absent: Meningismus





- Respiratory Exam


Respiratory Exam: Decreased Breath Sounds





- Cardiovascular Exam


Cardiovascular Exam: +S1, +S2





- GI/Abdominal Exam


GI & Abdominal Exam: Soft.  absent: Tenderness





Assessment and Plan





- Assessment and Plan (Free Text)


Plan: 





Assessment


Systemic inflammatory response syndrome, consider due to ovarian cancer and 

small bowel obstruction or ileus R/O sepsis from intra-abdominal infection


DM


HTN


obesity with BMI 37





Plan


on Vancomycin, Azactam and Flagyl day 6; cultures have been negative; reviewed 

CT A/P showing ovarian mass and possible SBO or ileus


will continue antibiotics until she can she can eat a regular diet


overall prognosis is poor

## 2018-05-01 NOTE — DS
HISTORY OF PRESENT ILLNESS:  This is a 62-year-old female who came into the

hospital complaining of abdominal pain.  The patient was found to have

uncontrolled pain secondary to her ovarian neoplasm.  During the hospital

stay, she developed an ileus.  This has improved as well.  She has her pain

medications adjusted.  Currently her fentanyl patch of 50 mcg is working

and Dilaudid that she gets for breakthrough pain helps.  She has no

complaints of any headaches.  She is not able to ambulate.



PHYSICAL EXAMINATION:

VITAL SIGNS:  Temperature is 98.5, pulse of 93, blood pressure 150/76,

respirations 20.

GENERAL:  The patient is lying in bed, flat, comfortable.

HEENT:  No oral lesion.  Anicteric sclerae.  Moist mucosa.

NECK:  No JVD, adenopathy, or thyromegaly.

CARDIOVASCULAR:  S1 and S2, regular.  No murmurs, rubs, or gallops.

LUNGS:  Clear to auscultation bilaterally.  No wheeze, rales, or rhonchi.

ABDOMEN:  Bowel sounds are positive, soft, nontender and nondistended.

EXTREMITIES:  No cyanosis, clubbing or edema.



LABORATORY DATA:  White count of 15.7, hemoglobin 9.9. creatinine 0.7.



DIAGNOSTIC DATA:  Abdominal x-ray done shows the small bowel dilatation

_____.  The patient has been tolerating her diet.



ASSESSMENT:

1.  Abdominal pain secondary to ovarian neoplasm.

2.  Small bowel obstruction/ileus.

3.  Ovarian neoplasm.

4.  Acute on chronic pain.

5.  Hypertension.

6.  Diabetes type 2.

7.  Cholelithiasis.

8.  PENICILLIN ALLERGY.



PLAN:  The patient will have the diet advanced.  If she is not able to

tolerate the diet then she will need to stay in the hospital.  There are no

plans for surgical intervention.  Patient is on Dilaudid for pain.  We will

renew her pain medications.  She is on Lovenox for DVT prophylaxis.  She is

on Zofran.  She is on a heart-healthy diet.







__________________________________________

Javan Goldsmith MD



DD:  04/30/2018 9:22:00

DT:  04/30/2018 12:35:41

Job # [id number]

## 2018-06-21 ENCOUNTER — HOSPITAL ENCOUNTER (OUTPATIENT)
Dept: HOSPITAL 42 - ED | Age: 63
Setting detail: OBSERVATION
LOS: 1 days | Discharge: NURSING, REHAB OR LONG-TERM CARE | End: 2018-06-22
Attending: INTERNAL MEDICINE | Admitting: INTERNAL MEDICINE
Payer: MEDICARE

## 2018-06-21 VITALS — SYSTOLIC BLOOD PRESSURE: 123 MMHG | TEMPERATURE: 98.9 F | DIASTOLIC BLOOD PRESSURE: 65 MMHG

## 2018-06-21 VITALS — OXYGEN SATURATION: 97 %

## 2018-06-21 VITALS — BODY MASS INDEX: 37 KG/M2

## 2018-06-21 VITALS — RESPIRATION RATE: 20 BRPM

## 2018-06-21 DIAGNOSIS — R18.8: ICD-10-CM

## 2018-06-21 DIAGNOSIS — H54.62: ICD-10-CM

## 2018-06-21 DIAGNOSIS — D64.81: ICD-10-CM

## 2018-06-21 DIAGNOSIS — C56.9: Primary | ICD-10-CM
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
## 2018-06-21 DIAGNOSIS — E83.42: ICD-10-CM

## 2018-06-21 DIAGNOSIS — D63.0: ICD-10-CM

## 2018-06-21 DIAGNOSIS — T45.1X5A: ICD-10-CM

## 2018-06-21 DIAGNOSIS — D70.1: ICD-10-CM

## 2018-06-21 DIAGNOSIS — H81.09: ICD-10-CM

## 2018-06-21 DIAGNOSIS — Z88.6: ICD-10-CM

## 2018-06-21 DIAGNOSIS — Z88.5: ICD-10-CM

## 2018-06-21 DIAGNOSIS — J90: ICD-10-CM

## 2018-06-21 DIAGNOSIS — I10: ICD-10-CM

## 2018-06-21 DIAGNOSIS — Z88.0: ICD-10-CM

## 2018-06-21 DIAGNOSIS — T82.594A: ICD-10-CM

## 2018-06-21 DIAGNOSIS — E11.9: ICD-10-CM

## 2018-06-21 DIAGNOSIS — Z90.710: ICD-10-CM

## 2018-06-21 LAB
% IRON SATURATION: 26 % (ref 20–55)
ALBUMIN SERPL-MCNC: 3.3 G/DL (ref 3–4.8)
ALBUMIN/GLOB SERPL: 1.1 {RATIO} (ref 1.1–1.8)
ALT SERPL-CCNC: 27 U/L (ref 7–56)
AST SERPL-CCNC: 40 U/L (ref 14–36)
BASOPHILS # BLD AUTO: 0.04 K/MM3 (ref 0–2)
BASOPHILS NFR BLD: 1.1 % (ref 0–3)
BASOPHILS NFR BLD: 3 % (ref 0–1)
BUN SERPL-MCNC: 17 MG/DL (ref 7–21)
CALCIUM SERPL-MCNC: 8.3 MG/DL (ref 8.4–10.5)
EOSINOPHIL # BLD: 0.1 10*3/UL (ref 0–0.7)
EOSINOPHIL NFR BLD: 2.8 % (ref 1.5–5)
EOSINOPHIL NFR BLD: 3 % (ref 0–3)
ERYTHROCYTE [DISTWIDTH] IN BLOOD BY AUTOMATED COUNT: 15.5 % (ref 11.5–14.5)
GFR NON-AFRICAN AMERICAN: > 60
GRANULOCYTES # BLD: 1.55 10*3/UL (ref 1.4–6.5)
GRANULOCYTES NFR BLD: 42.9 % (ref 50–68)
HGB BLD-MCNC: 7.5 G/DL (ref 12–16)
HYPOCHROMIA BLD QL SMEAR: (no result)
IRON SERPL-MCNC: 64 UG/DL (ref 45–180)
LYMPHOCYTE: 26 % (ref 22–35)
LYMPHOCYTES # BLD: 1 10*3/UL (ref 1.2–3.4)
LYMPHOCYTES NFR BLD AUTO: 26.6 % (ref 22–35)
MCH RBC QN AUTO: 28 PG (ref 25–35)
MCHC RBC AUTO-ENTMCNC: 31.6 G/DL (ref 31–37)
MCV RBC AUTO: 88.4 FL (ref 80–105)
MICROCYTES BLD QL SMEAR: SLIGHT
MONOCYTE: 19 % (ref 1–6)
MONOCYTES # BLD AUTO: 1 10*3/UL (ref 0.1–0.6)
MONOCYTES NFR BLD: 26.6 % (ref 1–6)
NEUTROPHILS NFR BLD AUTO: 48 % (ref 50–70)
NEUTS BAND NFR BLD: 1 % (ref 0–2)
NRBC BLD AUTO-RTO: 1 %
PLATELET # BLD EST: NORMAL 10*3/UL
PLATELET # BLD: 390 10^3/UL (ref 120–450)
PMV BLD AUTO: 8.9 FL (ref 7–11)
RBC # BLD AUTO: 2.68 10^6/UL (ref 3.5–6.1)
TIBC SERPL-MCNC: 242 UG/DL (ref 265–497)
WBC # BLD AUTO: 3.6 10^3/UL (ref 4.5–11)

## 2018-06-21 PROCEDURE — 86920 COMPATIBILITY TEST SPIN: CPT

## 2018-06-21 PROCEDURE — 93005 ELECTROCARDIOGRAM TRACING: CPT

## 2018-06-21 PROCEDURE — 86900 BLOOD TYPING SEROLOGIC ABO: CPT

## 2018-06-21 PROCEDURE — 85025 COMPLETE CBC W/AUTO DIFF WBC: CPT

## 2018-06-21 PROCEDURE — 71045 X-RAY EXAM CHEST 1 VIEW: CPT

## 2018-06-21 PROCEDURE — 36415 COLL VENOUS BLD VENIPUNCTURE: CPT

## 2018-06-21 PROCEDURE — 99282 EMERGENCY DEPT VISIT SF MDM: CPT

## 2018-06-21 PROCEDURE — 83735 ASSAY OF MAGNESIUM: CPT

## 2018-06-21 PROCEDURE — 82948 REAGENT STRIP/BLOOD GLUCOSE: CPT

## 2018-06-21 PROCEDURE — 96375 TX/PRO/DX INJ NEW DRUG ADDON: CPT

## 2018-06-21 PROCEDURE — 84466 ASSAY OF TRANSFERRIN: CPT

## 2018-06-21 PROCEDURE — 96365 THER/PROPH/DIAG IV INF INIT: CPT

## 2018-06-21 PROCEDURE — 96361 HYDRATE IV INFUSION ADD-ON: CPT

## 2018-06-21 PROCEDURE — 85027 COMPLETE CBC AUTOMATED: CPT

## 2018-06-21 PROCEDURE — 80053 COMPREHEN METABOLIC PANEL: CPT

## 2018-06-21 PROCEDURE — 86850 RBC ANTIBODY SCREEN: CPT

## 2018-06-21 PROCEDURE — 36430 TRANSFUSION BLD/BLD COMPNT: CPT

## 2018-06-21 RX ADMIN — INSULIN LISPRO SCH: 100 INJECTION, SOLUTION INTRAVENOUS; SUBCUTANEOUS at 22:30

## 2018-06-21 RX ADMIN — INSULIN LISPRO SCH: 100 INJECTION, SOLUTION INTRAVENOUS; SUBCUTANEOUS at 16:19

## 2018-06-21 RX ADMIN — NYSTATIN SCH ML: 100000 SUSPENSION ORAL at 22:19

## 2018-06-21 NOTE — CON
DATE:  06/21/2018



REQUESTING PHYSICIAN:  Dr. Goldsmith.



HISTORY OF PRESENT ILLNESS:  Ms. Adams is a 62-year-old female who was

advised to go to the ER for blood transfusion.  Hemoglobin was 7.5 in the

office this week.  She has metastatic ovarian cancer, status post debulking

surgery at [facility].  She had complicated surgical course

requiring Intensive Care Unit admission for 2 weeks.  She developed

ascites, respiratory distress, and bilateral pleural effusion that were

drained.  She has recovered remarkably well.  She was started on weekly

carboplatin/Taxol.  Received second week of chemo this week

carboplatin/Taxotere, tolerated chemo very well.  She is feeling weak. 

Shortness of breath has improved.  Ascites has improved.



PAST MEDICAL HISTORY:  Hypertension, blindness in left eye, Meniere

disease, diabetes mellitus type 2, history of peritonitis, and history of

small bowel obstruction.



PAST SURGICAL HISTORY:  Rotator cuff surgery on the right side.



FAMILY HISTORY:  Noncontributory.



PERSONAL HISTORY:  Never smoked.  No history of alcohol abuse.



SOCIAL HISTORY:  Currently in subacute rehab.



ALLERGIES:  PENICILLIN, PERCOCET, ASPIRIN, OXYCODONE.



HOME MEDICATIONS:  Tylenol 650 every 6 hours p.r.n., albuterol p.r.n.,

Lovenox 40 subcu daily, Dilaudid p.r.n., metformin, and simethicone.



REVIEW OF SYSTEMS:  As per HPI, rest of 12-point review of systems is

reviewed and negative.



PHYSICAL EXAMINATION:

GENERAL:  Comfortable in bed, in no acute distress.

VITAL SIGNS:  Temperature 98.7, heart rate 80 per minute, blood pressure

120/70.

HEENT:  Oral mucosa pale.

NECK:  No lymphadenopathy.

CHEST:  Air entry present and equal bilateral.  No added sounds.

CARDIOVASCULAR:  S1 and S2 normal.  No murmur, no gallop.

ABDOMEN:  Soft, nontender.  No hepatosplenomegaly.  Surgical scar _____.

EXTREMITIES:  Bilateral edema.



LABORATORY DATA AND IMAGING:  White count 3.6, hemoglobin 7.5, hematocrit

23.7, platelet count 320.  Sodium 135, potassium 4.1, BUN 17, creatinine

0.6, glucose 128.



Chest x-ray, no infiltrate.  She has PICC line, needed to be flushed.  Labs

ordered for tomorrow morning.  If stable hemoglobin and hematocrit, she can

be discharged to subacute rehab.



ASSESSMENT AND PLAN:

1.  Stage IV ovarian cancer, status post debulking surgery.  Started on

chemotherapy.  Received second dose of chemotherapy on Monday this week

with carboplatin/Taxotere.  We will continue weekly chemotherapy 2 week on,

1 week off.

2.  Chemotherapy-induced anemia.  Two units of blood transfusion today,

Aranesp 100 mcg subcu 1 time dose to be given today.

3.  Chemotherapy-induced leukopenia, white count 3.6.  We will give 1 dose

of Granix 480 mcg subcu daily.  Pain control with current medications.

4.  Hypomagnesemia, magnesium 1.6.  Two grams of magnesium IV are ordered.



Thank you, Dr. Goldsmith, for allowing us to participate in Ms. Adams's

care.







__________________________________________

Dang Matthews MD



DD:  06/21/2018 17:54:24

DT:  06/21/2018 19:09:36

Job # [id number]

## 2018-06-21 NOTE — ED PDOC
Arrival/HPI





- General


Time Seen by Provider: 06/21/18 11:35


Historian: Patient, EMS





- History of Present Illness


Narrative History of Present Illness (Text): 





06/21/18 11:49


61 y/o female, pmh including htn/ovarian cancer/dm/cholelithiasis/anemia with 

blood transfusion/left eye blindness, allergic to percocet/penicillin/aspirin, 

biba for repeat lab work for hgb check.  Pt. stated that she has ovarian cancer

, had hysterectomy 3 weeks ago and resulted anemia which she had blood 

transfusion, told that the hgb is going down again and here for the repeat hgb.

  Pt. is under the chemotherapy with picc line, no fever or chills, no chest 

pain or shortness of breath, no coughing, no numbness or tingling, no black 

color stool, no bleeding from the stool, no abdominal pain, admits fatigue but 

no coughing or URI, no rash, no palpitation, no numbness or tingling, no 

palpitation, no other medical or psychological complaints. 








Past Medical History





- Provider Review


Nursing Documentation Reviewed: Yes





- Cardiac


Hx Cardiac Disorders: Yes


Hx Hypertension: Yes





- Pulmonary


Hx Respiratory Disorders: No





- Neurological


Hx Neurological Disorder: No





- HEENT


Hx HEENT Disorder: Yes


Hx Blind: Yes (left eye)


Other/Comment: Meniere's Disease





- Renal


Hx Renal Disorder: No





- Endocrine/Metabolic


Hx Diabetes Mellitus Type 2: Yes





- Hematological/Oncological


Hx Blood Disorders: No





- Integumentary


Hx Dermatological Disorder: No





- Musculoskeletal/Rheumatological


Hx Musculoskeletal Disorders: No





- Gastrointestinal


Hx Gastrointestinal Disorders: Yes


Other/Comment: Peritonitis





- Genitourinary/Gynecological


Hx Genitourinary Disorders: No





- Psychiatric


Hx Psychophysiologic Disorder: No


Hx Substance Use: No





- Surgical History


Hx Orthopedic Surgery: Yes (right rotator cuff)





Family/Social History





- Physician Review


Nursing Documentation Reviewed: Yes


Family/Social History: Unknown Family HX


Smoking Status: Never Smoked


Hx Alcohol Use: No


Hx Substance Use: No





Allergies/Home Meds


Allergies/Adverse Reactions: 


Allergies





Penicillins Allergy (Verified 06/21/18 11:43)


 URTICARIA


acetaminophen [From Percocet] Adverse Reaction (Verified 06/21/18 11:43)


 DIZZINESS


aspirin Adverse Reaction (Verified 06/21/18 11:43)


 PAIN


oxycodone Adverse Reaction (Verified 06/21/18 11:43)


 DIZZINESS








Home Medications: 


 Home Meds











 Medication  Instructions  Recorded  Confirmed


 


Ramipril [Altace] 10 mg PO DAILY 04/24/18 06/21/18


 


Acetaminophen [Tylenol 160mg/5ml 640 mg PO Q6 PRN 06/21/18 06/21/18





elixir (120ml)]   


 


Albuterol HFA [Ventolin HFA 90 2 puff NEB Q6 PRN 06/21/18 06/21/18





mcg/actuation (8 g)]   


 


Enoxaparin [Lovenox] 40 mg SC DAILY 06/21/18 06/21/18


 


Famotidine [Pepcid] 20 mg PO BID 06/21/18 06/21/18


 


HYDROmorphone [Dilaudid 2 mg Tab] 2 mg PO Q4 PRN 06/21/18 06/21/18


 


Insulin Aspart, Recombinant See Protocol SC ACHS 06/21/18 06/21/18





[Novolog]   


 


MetFORMIN [glucoPHAGE] 500 mg PO BID 06/21/18 06/21/18


 


Ondansetron ODT [Zofran ODT] 4 mg PO Q4 PRN 06/21/18 06/21/18


 


Simethicone [Mylicon Chew Tab] 80 mg PO QID 06/21/18 06/21/18














Review of Systems





- Review of Systems


Constitutional: Fatigue.  absent: Fevers


Eyes: absent: Vision Changes


ENT: absent: Hearing Changes


Respiratory: absent: SOB, Cough


Cardiovascular: absent: Chest Pain


Gastrointestinal: absent: Abdominal Pain, Nausea, Vomiting


Skin: absent: Rash, Pruritis


Neurological: absent: Headache, Dizziness


Psychiatric: absent: Anxiety, Depression, Suicidal Ideation





Physical Exam


Vital Signs Reviewed: Yes


Vital Signs











  Temp Pulse Resp BP Pulse Ox


 


 06/21/18 13:38   112 H  16  132/71  99


 


 06/21/18 11:47  97.1 F L  119 H  16  144/79  98











Temperature: Afebrile


Blood Pressure: Normal


Pulse: Tachycardic


Respiratory Rate: Normal


Appearance: Positive for: Non-Toxic, Comfortable, Ill-Appearing


Pain Distress: None


Mental Status: Positive for: Alert and Oriented X 3





- Systems Exam


Head: Present: Atraumatic, Normocephalic


Pupils: Present: Other (left eye blindness noted)


Extroacular Muscles: Present: EOMI


Conjunctiva: Present: Normal


Ears: Present: NORMAL TM, Normal Canal.  No: Erythema


Mouth: Present: Moist Mucous Membranes


Neck: Present: Normal Range of Motion


Respiratory/Chest: Present: Clear to Auscultation, Good Air Exchange.  No: 

Respiratory Distress, Accessory Muscle Use


Cardiovascular: Present: Regular Rate and Rhythm, Normal S1, S2.  No: Murmurs


Abdomen: No: Tenderness, Distention, Peritoneal Signs


Rectal: Present: Normal Rectal Tone, Other (Female Chaperone: ER staff Karie mendoza)).  No: Occult Blood, Rectal Tenderness, Gross Blood, Melena, 

Hemorrhoids, Fissures, Nodule/Mass/Lesions


Back: Present: Normal Inspection


Upper Extremity: Present: Normal Inspection.  No: Cyanosis, Edema


Lower Extremity: Present: Normal Inspection.  No: Edema


Neurological: Present: GCS=15, CN II-XII Intact, Speech Normal, Motor Func 

Grossly Intact, Gait Normal, Memory Normal


Skin: Present: Warm, Dry, Normal Color.  No: Rashes


Psychiatric: Present: Alert, Oriented x 3, Normal Insight, Normal Concentration





Medical Decision Making


ED Course and Treatment: 





06/21/18 11:59


-labs/type and screen/ua


-ekg (tachycardia, admission)


-cxr


-cardiac monitor


-IVF


-observe and reassess





06/21/18 13:30


-EKG: Sinus Tachycardia @ 114 BPM, no ST elevation or depression, no T wave 

inversion


-Chest xray: no active disease


-Labs are non-significant except hgb 7.5 from 8.7 (2 units PRBC/transferrin/TIBC

/Iron/benadryl ordered for transfusion with consent obtained), Mg 1.6 (magsu 

1gm ordered)


-Guaiac is negative


-UA ordered and pending


-Pt. will need admission for blood transfusion,paging the pmd Dr. Jara and 

Dr. Matthews


-I spoke to Dr. Jara about the case/labs/radiology/treatment, agreed that 

the patient needs admission and blood transfusion, request Dr. Matthews on the 

case as well for routine consult.


-I spoke to Dr. Matthews about the case/labs/radiology result/treatment, agreed on 

the transfusion and will be on the consult about this case. 


-I discussed with DR. Rollins about the case, agreed on the treatment and 

admission, he will put in the admission order. 








- Critical Care


Critical Care Minutes: 30 minutes


Narrative Critical Care (Text): 





06/21/18 13:16


hgb 7.5/tachycardia/ill appearing, needs blood transfusion. 





- Lab Interpretations


Lab Results: 








 06/21/18 12:00 





 06/21/18 12:00 





 Lab Results





06/21/18 12:30: Blood Type AB POSITIVE, Antibody Screen Negative, Crossmatch 

See Detail, BBK History Checked Patient has bt


06/21/18 12:00: WBC 3.6 L D, RBC 2.68 L, Hgb 7.5 L, Hct 23.7 L, MCV 88.4, MCH 

28.0, MCHC 31.6, RDW 15.5 H, Plt Count 390, MPV 8.9, Gran % 42.9 L, Lymph % (

Auto) 26.6, Mono % (Auto) 26.6 H, Eos % (Auto) 2.8, Baso % (Auto) 1.1, Gran # 

1.55, Lymph # (Auto) 1.0 L, Mono # (Auto) 1.0 H, Eos # (Auto) 0.1, Baso # (Auto

) 0.04, Neutrophils % (Manual) 48 L, Band Neutrophils % 1, Lymphocytes % (Manual

) 26, Monocytes % (Manual) 19 H, Eosinophils % (Manual) 3, Basophils % (Manual) 

3 H, Nucleated RBC % 1, Platelet Evaluation Normal, Hypochromasia 1+, 

Microcytosis (manual) Slight


06/21/18 12:00: Sodium 135, Potassium 4.1, Chloride 98, Carbon Dioxide 27, 

Anion Gap 14, BUN 17, Creatinine 0.6 L, Est GFR ( Amer) > 60, Est GFR (

Non-Af Amer) > 60, Random Glucose 128 H, Calcium 8.3 L, Magnesium 1.6 L, Total 

Bilirubin 0.4, AST 40 H D, ALT 27, Alkaline Phosphatase 90, Total Protein 6.4, 

Albumin 3.3, Globulin 3.1, Albumin/Globulin Ratio 1.1











- RAD Interpretation


Radiology Orders: 








06/21/18 12:00


CHEST PORTABLE [RAD] Stat 








HISTORY:


medical clearance  





COMPARISON:


4/24/2018 





FINDINGS:





LUNGS:


No active pulmonary disease.





PLEURA:


No significant pleural effusion identified, no pneumothorax apparent.





CARDIOVASCULAR:


New right PICC catheter terminates in the region of the superior vena cava.





OSSEOUS STRUCTURES:


No significant abnormalities.





VISUALIZED UPPER ABDOMEN:


Normal.





OTHER FINDINGS:


None.





IMPRESSION:


No infiltrate.  New right PICC catheter as above.





: Radiologist





- EKG Interpretation


EKG Interpretation (Text): 





06/21/18 12:22


Sinus Tachycardia @ 114 BPM, no ST elevation or depression, no T wave inversion


Interpreted by ED Physician: Yes


Type: 12 lead EKG





- Medication Orders


Current Medication Orders: 








Magnesium Sulfate/Dextrose (Magnesium Sulfate 1 Gm/100 Ml D5w)  1 gm in 100 mls 

@ 100 mls/hr IVPB ONCE ONE


   Stop: 06/21/18 14:20


   Last Admin: 06/21/18 13:31  Dose: 100 mls/hr





eMAR Start Stop


 Document     06/21/18 13:31  RE  (Rec: 06/21/18 13:32  RE  [id number])


     Intravenous Solution


      Start Date                                 06/21/18


      Start Time                                 13:31


      End Date                                   06/21/18


      End time                                   14:31


      Total Infusion Time                        60








Discontinued Medications





Diphenhydramine HCl (Benadryl)  25 mg PO ONCE ONE


   Stop: 06/21/18 12:56


Sodium Chloride (Sodium Chloride 0.9%)  1,000 mls @ 999 mls/hr IV .Q1H1M STA


   Stop: 06/21/18 13:00


   Last Admin: 06/21/18 12:16  Dose: 999 mls/hr





eMAR Start Stop


 Document     06/21/18 12:16  RE  (Rec: 06/21/18 12:17  RE  [id number])


     Intravenous Solution


      Start Date                                 06/21/18


      Start Time                                 12:17


      End Date                                   06/21/18


      End time                                   13:17


      Total Infusion Time                        60














- PA / NP / Resident Statement


MD/DO has reviewed & agrees with the documentation as recorded.





Disposition/Present on Arrival





- Present on Arrival


Any Indicators Present on Arrival: No


History of DVT/PE: No


History of Uncontrolled Diabetes: No


Urinary Catheter: No


History of Decub. Ulcer: No


History Surgical Site Infection Following: None





- Disposition


Have Diagnosis and Disposition been Completed?: Yes


Diagnosis: 


 Anemia, Hypomagnesemia





Disposition: HOSPITALIZED


Disposition Time: 13:01


Patient Plan: Admission, Telemetry


Patient Problems: 


 Current Active Problems











Problem Status Onset


 


Anemia Acute  


 


Hypomagnesemia Acute  











Condition: STABLE

## 2018-06-21 NOTE — RAD
HISTORY:

medical clearance  



COMPARISON:

4/24/2018 



FINDINGS:



LUNGS:

No active pulmonary disease.



PLEURA:

No significant pleural effusion identified, no pneumothorax apparent.



CARDIOVASCULAR:

New right PICC catheter terminates in the region of the superior vena 

cava.



OSSEOUS STRUCTURES:

No significant abnormalities.



VISUALIZED UPPER ABDOMEN:

Normal.



OTHER FINDINGS:

None.



IMPRESSION:

No infiltrate.  New right PICC catheter as above.

## 2018-06-22 VITALS — HEART RATE: 105 BPM

## 2018-06-22 LAB
ERYTHROCYTE [DISTWIDTH] IN BLOOD BY AUTOMATED COUNT: 16.1 % (ref 11.5–14.5)
HGB BLD-MCNC: 9.4 G/DL (ref 12–16)
MCH RBC QN AUTO: 28.8 PG (ref 25–35)
MCHC RBC AUTO-ENTMCNC: 33 G/DL (ref 31–37)
MCV RBC AUTO: 87.4 FL (ref 80–105)
PLATELET # BLD: 332 10^3/UL (ref 120–450)
PMV BLD AUTO: 8.7 FL (ref 7–11)
RBC # BLD AUTO: 3.26 10^6/UL (ref 3.5–6.1)
WBC # BLD AUTO: 12.1 10^3/UL (ref 4.5–11)

## 2018-06-22 RX ADMIN — INSULIN LISPRO SCH: 100 INJECTION, SOLUTION INTRAVENOUS; SUBCUTANEOUS at 13:06

## 2018-06-22 RX ADMIN — NYSTATIN SCH ML: 100000 SUSPENSION ORAL at 10:04

## 2018-06-22 RX ADMIN — INSULIN LISPRO SCH: 100 INJECTION, SOLUTION INTRAVENOUS; SUBCUTANEOUS at 08:04

## 2018-06-22 RX ADMIN — NYSTATIN SCH: 100000 SUSPENSION ORAL at 13:57

## 2018-06-22 NOTE — CP.PCM.PN
Subjective





- Date & Time of Evaluation


Date of Evaluation: 06/22/18


Time of Evaluation: 04:48





- Subjective


Subjective: 





S:A cathflow order was requested for clogged PICC line.


   Asymptomatic.





O:


Last Vital Signs





 3





Temp  98.9 F   06/21/18 22:00


 


Pulse  114 H  06/21/18 22:00


 


Resp  20   06/21/18 22:00


 


BP  123/65   06/21/18 22:00


 


Pulse Ox  97   06/21/18 18:00








   Asleep.


   Not in distress.


   LUNGS:Normal breathing pattern.








A:Clogged PICC line.





P:Cathflow as ordered.





Objective





- Vital Signs/Intake and Output


Vital Signs (last 24 hours): 


 











Temp Pulse Resp BP Pulse Ox


 


 98.9 F   114 H  20   123/65   97 


 


 06/21/18 22:00  06/21/18 22:00  06/21/18 22:00  06/21/18 22:00  06/21/18 18:00








Intake and Output: 


 











 06/21/18 06/22/18





 18:59 06:59


 


Intake Total 0 375


 


Output Total  250


 


Balance 0 125














- Medications


Medications: 


 Current Medications





Acetaminophen (Tylenol 325mg Tab)  650 mg PO Q6H PRN


   PRN Reason: Pain, moderate (4-7)


Docusate Sodium (Colace)  100 mg PO DAILY [facility]


Famotidine (Pepcid)  20 mg PO 1000,2200 [facility]


   Last Admin: 06/21/18 22:20 Dose:  20 mg


Fentanyl (Duragesic)  1 patch TD Q72H [facility]


   Last Admin: 06/21/18 16:29 Dose:  1 patch


Hydromorphone HCl (Dilaudid)  2 mg PO Q4 PRN


   PRN Reason: Pain, severe (8-10)


Insulin Human Lispro (Humalog Low)  0 units SC ACHS [facility]


   PRN Reason: Protocol


   Last Admin: 06/21/18 16:19 Dose:  Not Given


Magnesium Oxide (Mag-Ox)  400 mg PO BID [facility]


Nystatin (Nystatin Oral Susp)  5 ml PO QID [facility]


   Last Admin: 06/21/18 22:19 Dose:  5 ml


Ondansetron HCl (Zofran Tab)  4 mg PO Q8H PRN


   PRN Reason: Nausea/Vomiting


Ramipril (Altace)  10 mg PO DAILY [facility]


Simethicone (Mylicon Chew Tab)  80 mg PO PCHS PRN


   PRN Reason: GI distress

## 2018-06-22 NOTE — CARD
--------------- APPROVED REPORT --------------





EKG Measurement

Heart [id number]

VA [id number]

[id number]

[id number]

[id number]



<Conclusion>

Sinus tachycardia

Minimal voltage criteria for LVH, may be normal variant

Possible Anterior infarct, age undetermined

Abnormal ECG

## 2018-06-22 NOTE — HP
CHIEF COMPLAINT AND HISTORY OF PRESENT ILLNESS:  This is a 62-year-old

female who is coming into the hospital because of anemia.  She had gone to

see Dr. Matthews who is her oncologist and was found to be significantly

anemic.  The patient has a history of ovarian cancer and has been getting

treatment.  She is currently at a rehab facility.  The patient had a

hysterectomy about 3 weeks ago and required transfusions postop.  The

patient has a PICC line and has been getting chemotherapy as an outpatient.

She has no complaints of any chest pain, no shortness of breath, no

headaches or dizziness, no nausea, no vomiting.  She had debulking surgery

at [facility].  She has been improving.  The patient was staged

having stage IV ovarian cancer.  The patient was given 2 units of blood

transfusions yesterday and was also given Granix.



REVIEW OF SYSTEMS:  The patient says she is walking better in physical

therapy.  She has no headaches or dizziness.  No nausea, no abdominal pain,

no dysuria.  All other review of symptoms are within normal limits except

what was mentioned.



ALLERGIES:  PENICILLIN, ACETAMINOPHEN, ASPIRIN, OXYCODONE.



FAMILY HISTORY:  Noncontributory.



PAST SURGICAL HISTORY:  Rotator cuff surgery.



PAST MEDICAL HISTORY:  Diabetes type 2, hypertension.



MEDICATIONS:  Medications have been reviewed on the MAR.



PHYSICAL EXAMINATION

VITAL SIGNS:  Temperature is 98.9, pulse of 116, blood pressure is 123/65,

respirations 20.  Height is 5 feet 5 inches, weight is 213 pounds, BMI is

35.4.

GENERAL:  The patient lying in bed, uncomfortable, and in no acute

distress.

HEENT:  Atraumatic and normocephalic.  Anicteric sclerae.  Moist mucosa.

Pink conjunctivae.  No oral lesions.

NECK:  No JVD, anterior and posterior adenopathy, thyromegaly, or bruits.

CARDIOVASCULAR:  S1 and S2 regular.  No murmur, rubs, or gallop.

LUNGS:  Clear to auscultation bilaterally.  No wheezes, rales, or rhonchi.

ABDOMEN:  Bowel sounds are positive.  Soft, nontender and nondistended.  No

hepatosplenomegaly. No rebound and no guarding

EXTREMITIES:  No cyanosis, clubbing, or edema.

NEUROLOGIC:  No facial asymmetry.  Tongue is midline.  No uvula deviation. 

Power is 5/5 upper extremity and lower extremity.  Sensation intact in

upper extremity and lower extremity.

PSYCHIATRIC:  She is awake, alert and oriented x3.  No anxiety or

depression.  She has normal affect.

GENITOURINARY:  No CVA tenderness.

VASCULAR:  2+ pulses in the carotid pulses and pedal pulses.

SKIN:  No erythema or nodules

SPINE:  Shows normal curvature.



ASSESSMENT:

1.  Acute anemia secondary to chemotherapy and cancer.

2.  Hypertension.

3.  Diabetes type 2.

4.  Hypomagnesemia.



PLAN:  The patient is currently comfortable.  The patient's hemoglobin has

improved from 7.5-9.4 after 2 units of transfusion.  The patient's anemia

is multifactorial.  She is on fentanyl for pain.  She is on _____ Dilaudid

for pain.  She was placed on magnesium replacement and also given IV

magnesium.  The patient was given IV fluids.  She is on a heart-healthy

diet.  She is going to continue with Altace for her hypertension.  She is

going to be discharged back to her facility.





__________________________________________

Javan Goldsmith MD



DD:  06/22/2018 8:29:27

DT:  06/22/2018 8:30:49

Job # [id number]

## 2018-06-30 ENCOUNTER — HOSPITAL ENCOUNTER (INPATIENT)
Dept: HOSPITAL 42 - ED | Age: 63
LOS: 3 days | Discharge: HOSPICE | DRG: 872 | End: 2018-07-03
Attending: INTERNAL MEDICINE | Admitting: INTERNAL MEDICINE
Payer: MEDICARE

## 2018-06-30 VITALS — BODY MASS INDEX: 36.4 KG/M2

## 2018-06-30 DIAGNOSIS — R18.8: ICD-10-CM

## 2018-06-30 DIAGNOSIS — E87.2: ICD-10-CM

## 2018-06-30 DIAGNOSIS — R65.20: ICD-10-CM

## 2018-06-30 DIAGNOSIS — I25.10: ICD-10-CM

## 2018-06-30 DIAGNOSIS — Z51.5: ICD-10-CM

## 2018-06-30 DIAGNOSIS — C56.9: ICD-10-CM

## 2018-06-30 DIAGNOSIS — I13.0: ICD-10-CM

## 2018-06-30 DIAGNOSIS — Z78.1: ICD-10-CM

## 2018-06-30 DIAGNOSIS — Z66: ICD-10-CM

## 2018-06-30 DIAGNOSIS — C78.6: ICD-10-CM

## 2018-06-30 DIAGNOSIS — A41.9: Primary | ICD-10-CM

## 2018-06-30 DIAGNOSIS — E11.9: ICD-10-CM

## 2018-06-30 DIAGNOSIS — I50.9: ICD-10-CM

## 2018-06-30 DIAGNOSIS — N39.0: ICD-10-CM

## 2018-06-30 DIAGNOSIS — N17.9: ICD-10-CM

## 2018-06-30 DIAGNOSIS — T45.1X5A: ICD-10-CM

## 2018-06-30 DIAGNOSIS — K56.609: ICD-10-CM

## 2018-06-30 DIAGNOSIS — D64.81: ICD-10-CM

## 2018-06-30 DIAGNOSIS — Z90.710: ICD-10-CM

## 2018-06-30 DIAGNOSIS — N18.9: ICD-10-CM

## 2018-06-30 DIAGNOSIS — C78.00: ICD-10-CM

## 2018-06-30 DIAGNOSIS — E87.5: ICD-10-CM

## 2018-06-30 DIAGNOSIS — H81.09: ICD-10-CM

## 2018-06-30 DIAGNOSIS — E66.9: ICD-10-CM

## 2018-06-30 DIAGNOSIS — Z88.0: ICD-10-CM

## 2018-06-30 DIAGNOSIS — H54.62: ICD-10-CM

## 2018-06-30 LAB
ALBUMIN SERPL-MCNC: 3.7 G/DL (ref 3–4.8)
ALBUMIN/GLOB SERPL: 1.1 {RATIO} (ref 1.1–1.8)
ALT SERPL-CCNC: 25 U/L (ref 7–56)
APTT BLD: 24.1 SECONDS (ref 25.1–36.5)
AST SERPL-CCNC: 26 U/L (ref 14–36)
BASE EXCESS BLDV CALC-SCNC: -6.7 MMOL/L (ref 0–2)
BASOPHILS # BLD AUTO: 0.08 K/MM3 (ref 0–2)
BASOPHILS NFR BLD: 0.4 % (ref 0–3)
BUN SERPL-MCNC: 36 MG/DL (ref 7–21)
CALCIUM SERPL-MCNC: 9.4 MG/DL (ref 8.4–10.5)
EOSINOPHIL # BLD: 0 10*3/UL (ref 0–0.7)
EOSINOPHIL NFR BLD: 0 % (ref 1.5–5)
ERYTHROCYTE [DISTWIDTH] IN BLOOD BY AUTOMATED COUNT: 16.9 % (ref 11.5–14.5)
GFR NON-AFRICAN AMERICAN: 24
GRANULOCYTES # BLD: 17.92 10*3/UL (ref 1.4–6.5)
GRANULOCYTES NFR BLD: 80.3 % (ref 50–68)
HGB BLD-MCNC: 9.3 G/DL (ref 12–16)
INR PPP: 1.1 (ref 0.93–1.08)
LYMPHOCYTES # BLD: 2.4 10*3/UL (ref 1.2–3.4)
LYMPHOCYTES NFR BLD AUTO: 10.8 % (ref 22–35)
MCH RBC QN AUTO: 28.6 PG (ref 25–35)
MCHC RBC AUTO-ENTMCNC: 32.3 G/DL (ref 31–37)
MCV RBC AUTO: 88.6 FL (ref 80–105)
MONOCYTES # BLD AUTO: 1.9 10*3/UL (ref 0.1–0.6)
MONOCYTES NFR BLD: 8.5 % (ref 1–6)
PH BLDV: 7.3 [PH] (ref 7.32–7.43)
PLATELET # BLD: 359 10^3/UL (ref 120–450)
PMV BLD AUTO: 9.4 FL (ref 7–11)
PROTHROMBIN TIME: 12.6 SECONDS (ref 9.4–12.5)
RBC # BLD AUTO: 3.25 10^6/UL (ref 3.5–6.1)
TROPONIN I SERPL-MCNC: < 0.01 NG/ML
VENOUS BLOOD FIO2: 21 %
VENOUS BLOOD GAS PCO2: 39 (ref 40–60)
VENOUS BLOOD GAS PO2: 61 MM/HG (ref 30–55)
WBC # BLD AUTO: 22.3 10^3/UL (ref 4.5–11)

## 2018-06-30 NOTE — ED PDOC
Arrival/HPI





- General


Chief Complaint: GI Problem


Time Seen by Provider: 06/30/18 20:12


Historian: Patient





- History of Present Illness


Narrative History of Present Illness (Text): 


06/30/18 20:36


62 year old female, whose past medical history includes hypertension, diabetes, 

anemia, and ovarian cancer (last chemo was 2 weeks ago), who presents to the 

emergency department complaining of general malaise. Patient notes associated 

with nausea, vomiting, and abdominal discomfort. Patient also notes she is 

"sweating a lot". Patient denies any fever, chills, chest pain, shortness of 

breath, diarrhea, back pain, neck pain, headache, dizziness, or any other 

complaints. 


Time/Duration: 24 hours


Symptom Onset: Gradual


Symptom Course: Unchanged


Activities at Onset: Light


Context: Home





Past Medical History





- Provider Review


Nursing Documentation Reviewed: Yes





- Infectious Disease


Hx of Infectious Diseases: None





- Cardiac


Hx Cardiac Disorders: Yes


Hx Hypertension: Yes





- Pulmonary


Hx Respiratory Disorders: No





- Neurological


Hx Neurological Disorder: No





- HEENT


Hx HEENT Disorder: Yes


Hx Blind: Yes (left eye)


Other/Comment: Meniere's Disease





- Renal


Hx Renal Disorder: No





- Endocrine/Metabolic


Hx Diabetes Mellitus Type 2: Yes





- Hematological/Oncological


Hx Blood Disorders: No





- Integumentary


Hx Dermatological Disorder: No





- Musculoskeletal/Rheumatological


Hx Falls: No





- Gastrointestinal


Hx Gastrointestinal Disorders: Yes


Other/Comment: Peritonitis





- Genitourinary/Gynecological


Hx Genitourinary Disorders: No





- Psychiatric


Hx Psychophysiologic Disorder: No


Hx Substance Use: No





- Surgical History


Hx Orthopedic Surgery: Yes (right rotator cuff)


Other/Comment: pelvic mass removed lap.





- Anesthesia


Hx Anesthesia: Yes


Hx Anesthesia Reactions: No


Hx Malignant Hyperthermia: No





Family/Social History





- Physician Review


Nursing Documentation Reviewed: Yes


Family/Social History: Unknown Family HX


Smoking Status: Never Smoked


Hx Alcohol Use: No


Hx Substance Use: No





Allergies/Home Meds


Allergies/Adverse Reactions: 


Allergies





Penicillins Allergy (Verified 06/21/18 11:43)


 URTICARIA


acetaminophen [From Percocet] Adverse Reaction (Verified 06/21/18 11:43)


 DIZZINESS


aspirin Adverse Reaction (Verified 06/21/18 11:43)


 PAIN


oxycodone Adverse Reaction (Verified 06/21/18 11:43)


 DIZZINESS








Home Medications: 


 Home Meds











 Medication  Instructions  Recorded  Confirmed


 


Ramipril [Altace] 10 mg PO DAILY 04/24/18 06/30/18


 


Acetaminophen [Tylenol 160mg/5ml 640 mg PO Q6 PRN 06/21/18 06/30/18





elixir (120ml)]   


 


Albuterol HFA [Ventolin HFA 90 2 puff NEB Q6 PRN 06/21/18 06/30/18





mcg/actuation (8 g)]   


 


Famotidine [Pepcid] 20 mg PO BID 06/21/18 06/30/18


 


HYDROmorphone [Dilaudid] 2 mg PO Q4 PRN 06/21/18 06/30/18


 


Insulin Aspart, Recombinant See Protocol SC ACHS 06/21/18 06/30/18





[Novolog]   


 


Ondansetron ODT [Zofran ODT] 4 mg PO Q4 PRN 06/21/18 06/30/18


 


Simethicone [Mylicon Chew Tab] 80 mg PO QID 06/21/18 06/30/18














Review of Systems





- Physician Review


All systems were reviewed & negative as marked: Yes





- Review of Systems


Constitutional: Normal


Eyes: Normal


ENT: Normal


Respiratory: Normal.  absent: SOB, Cough


Cardiovascular: Normal.  absent: Chest Pain


Gastrointestinal: Abdominal Pain, Nausea, Vomiting.  absent: Diarrhea


Genitourinary Female: Normal.  absent: Dysuria, Frequency, Hematuria


Musculoskeletal: Normal


Skin: Normal.  absent: Rash


Neurological: Normal.  absent: Headache, Dizziness


Endocrine: Normal


Hemo/Lymphatic: Normal


Psychiatric: Normal





Physical Exam


Vital Signs Reviewed: Yes


Vital Signs











  Temp Pulse Resp BP Pulse Ox


 


 07/01/18 03:22  97.8 F  126 H  18  146/80  100


 


 07/01/18 00:55  98.1 F  122 H  18  126/74  100


 


 06/30/18 20:23  97.5 F L  128 H  18  163/94 H  100











Temperature: Afebrile


Blood Pressure: Hypertensive


Pulse: Tachycardic


Respiratory Rate: Normal


Appearance: Positive for: Well-Appearing, Non-Toxic, Comfortable


Pain Distress: None


Mental Status: Positive for: Alert and Oriented X 3





- Systems Exam


Head: Present: Atraumatic, Normocephalic


Pupils: Present: PERRL


Extroacular Muscles: Present: EOMI


Conjunctiva: Present: Normal


Mouth: Present: Moist Mucous Membranes


Neck: Present: Normal Range of Motion.  No: Meningeal Signs, MIDLINE TENDERNESS

, Paraspinal Tenderness


Respiratory/Chest: Present: Clear to Auscultation, Good Air Exchange.  No: 

Respiratory Distress, Accessory Muscle Use


Cardiovascular: Present: Regular Rate and Rhythm, Normal S1, S2.  No: Murmurs


Abdomen: Present: Tenderness (mild diffuse tenderness), Distention (mild 

abdominal distention).  No: Peritoneal Signs


Back: Present: Normal Inspection.  No: CVA Tenderness, Midline Tenderness, 

Paraspinal Tenderness


Upper Extremity: Present: Normal Inspection.  No: Cyanosis, Edema


Lower Extremity: Present: Normal Inspection.  No: Edema, CALF TENDERNESS


Neurological: Present: GCS=15, CN II-XII Intact, Speech Normal


Skin: Present: Warm, Dry, Pale.  No: Rashes


Psychiatric: Present: Alert, Oriented x 3, Normal Insight, Normal Concentration





Medical Decision Making


ED Course and Treatment: 


06/30/18 20:42


Impression:


62 year old female presents to the emergency department complaining of  general 

malaise, abdominal discomfort, nausea, and vomiting. 





Plan:





-- VBG


-- CT Abd/Pelvis


-- EKG


-- Labs


-- CXR


-- Morphine


-- Pepcid


-- Sodium Chloride


-- Zofran


-- Blood Culture


-- Urine Culture


-- UA


-- Reassess and disposition





Progress Notes:


06/30/18 21:03


EKG reviewed, shows Sinus tachycardia at 127 bpm. Non-specific ST/T changes.





06/30/18 23:22


CODE SEPSIS ACTIVATED.





06/30/18 23:56


Chest X-ray reviewed, shows no acute processes.





07/01/18 00:57


CT of Abdomen/Pelvis reviewed by radiologist, shows:


IMPRESSION: Marked progression of disease, 3 new pulmonary nodules suspicious 

for metastases;


interval development of peritoneal carcinomatosis; multiple complex masses in 

the abdomen and


pelvis consistent with metastatic deposits; loculated fluid collection with 

fluid fluid level suggesting


layering blood products; probable anemia; possible early/developing small bowel 

obstruction; mild


bilateral ureterectasis; gallstones.





07/01/18 00:50


Discussed case with surgical resident, who is aware and agrees with Emergency 

department management plan. 





07/01/18 01:01


Discussed case with Dr. Mitchell, covering for Dr. Goldsmith, who is aware and 

agrees with Emergency department management plan, accepts patient under Dr. Goldsmith's service. Requests Dr. Mcgrath, Dr. Bishop and Dr. Matthews on 

consult.





07/01/18 01:06


Discussed case with Dr. Yates, who is aware and agrees with plan, will evaluate 

patient for ICU admission. 





07/01/18 01:29


Dr. Yates evaluated patient at bedside. 





07/01/18 03:00


Consult for possible ICU admit was requested.Pt. was evaluated by the 

intensivist and medical resident.Determined that patient is stable for 

Telemetry and not an ICU candidate at this time.








- Lab Interpretations


Lab Results: 








 06/30/18 21:32 





 06/30/18 21:32 





 Lab Results





07/01/18 02:05: POC Glucose (mg/dL) 187 H


07/01/18 01:10: pO2 121 H, VBG pH 7.30 L, VBG pCO2 41.0, VBG HCO3 20.2 L, VBG 

Total CO2 21.5 L, VBG O2 Sat (Calc) 99.3 H, VBG Base Excess -5.9 L, VBG 

Potassium 5.0, Sodium 133.0, Chloride 103.0, Glucose 192 H, Lactate 1.9, FiO2 

21.0, Venous Blood Potassium 5.0


07/01/18 00:24: Urine Color Yellow, Urine Appearance Sl cloudy, Urine pH 6.0, 

Ur Specific Gravity >= 1.030, Urine Protein 100 H, Urine Glucose (UA) Negative, 

Urine Ketones 15 H, Urine Blood Trace-intact H, Urine Nitrate Negative, Urine 

Bilirubin Small H, Urine Urobilinogen 1.0 H, Ur Leukocyte Esterase Trace H, 

Urine RBC 1 - 3, Urine WBC 2 - 5, Ur Epithelial Cells 1 - 3, Urine Bacteria Mod


06/30/18 21:32: Sodium 132, Chloride 97 L, Potassium 5.1 H, Carbon Dioxide 17 L

, Anion Gap 23 H, BUN 36 H, Creatinine 2.1 H, Est GFR ( Amer) 29, Est 

GFR (Non-Af Amer) 24, Random Glucose 252 H, Calcium 9.4, Total Bilirubin 0.7, 

AST 26, ALT 25, Alkaline Phosphatase 115, Lactate Dehydrogenase 831 H, Total 

Creatine Kinase 38, Troponin I < 0.01, Total Protein 6.9, Albumin 3.7, Globulin 

3.3, Albumin/Globulin Ratio 1.1


06/30/18 21:32: pO2 61 H, VBG pH 7.30 L, VBG pCO2 39.0 L, VBG HCO3 19.2 L, VBG 

Total CO2 20.4 L, VBG O2 Sat (Calc) 93.7 H, VBG Base Excess -6.7 L, VBG 

Potassium 5.1, Sodium 131.0 L, Chloride 100.0, Glucose 271 H, Lactate 3.1 H, 

FiO2 21.0, Venous Blood Potassium 5.1


06/30/18 21:32: PT 12.6 H, INR 1.10 H, APTT 24.1 L


06/30/18 21:32: WBC 22.3 H D, RBC 3.25 L, Hgb 9.3 L, Hct 28.8 L, MCV 88.6, MCH 

28.6, MCHC 32.3, RDW 16.9 H, Plt Count 359, MPV 9.4, Gran % 80.3 H, Lymph % (

Auto) 10.8 L, Mono % (Auto) 8.5 H, Eos % (Auto) 0.0 L, Baso % (Auto) 0.4, Gran 

# 17.92 H, Lymph # (Auto) 2.4, Mono # (Auto) 1.9 H, Eos # (Auto) 0.0, Baso # (

Auto) 0.08











- RAD Interpretation


Radiology Orders: 








06/30/18 20:31


CHEST PORTABLE [RAD] Stat 





06/30/18 20:33


ABD & PELVIS W/O PO OR IV CONT [CT] Stat 











: ED Physician, Radiologist





- Medication Orders


Current Medication Orders: 








Heparin Sodium (Porcine) (Heparin)  5,000 units SC Q8 SANDY


   PRN Reason: Protocol


Metronidazole (Flagyl)  500 mg in 100 mls @ 100 mls/hr IVPB Q8 SANDY


   PRN Reason: Protocol


Insulin Human Regular (Humulin R Med)  0 units SC Q6 SANDY


   PRN Reason: Protocol


Ipratropium Bromide (Atrovent)  0.5 mg IH Q3 PRN


   PRN Reason: Shortness of Breath


Levalbuterol HCl (Xopenex)  1.25 mg IH Q3 PRN


   PRN Reason: Shortness of Breath


Ondansetron HCl (Zofran Inj)  4 mg IVP Q6H PRN


   PRN Reason: Nausea/Vomiting





Discontinued Medications





Famotidine (Pepcid)  20 mg IVP STAT STA


   Stop: 06/30/18 20:33


   Last Admin: 06/30/18 21:29  Dose: 20 mg





IVP Administration


 Document     06/30/18 21:29  RG  (Rec: 06/30/18 21:48  [id number]


     Charges for Administration


      # of IVP Administrations                   1





Sodium Chloride (Sodium Chloride 0.9%)  1,000 mls @ 2,000 mls/hr IV .Q30M ONE


   Stop: 06/30/18 21:00


   Last Admin: 06/30/18 21:39  Dose: 2,000 mls/hr





eMAR Start Stop


 Document     06/30/18 21:39  RG  (Rec: 06/30/18 21:47  [facility] [id number])


     Intravenous Solution


      Start Date                                 06/30/18


      Start Time                                 21:39





Aztreonam (Azactam 1 Gm)  100 mls @ 100 mls/hr IVPB STAT STA


   PRN Reason: Protocol


   Stop: 07/01/18 00:30


   Last Admin: 07/01/18 00:35  Dose: 100 mls/hr





eMAR Start Stop


 Document     07/01/18 00:35  RG  (Rec: 07/01/18 00:48  [facility] [id number])


     Intravenous Solution


      Start Date                                 07/01/18


      Start Time                                 00:35





Metronidazole (Flagyl)  500 mg in 100 mls @ 100 mls/hr IV STAT STA


   PRN Reason: Protocol


   Stop: 07/01/18 00:31


   Last Admin: 06/30/18 23:40  Dose: 100 mls/hr





eMAR Start Stop


 Document     06/30/18 23:40  RG  (Rec: 06/30/18 23:40  [facility] [id number])


     Intravenous Solution


      Start Date                                 06/30/18


      Start Time                                 23:40





Sodium Chloride (Sodium Chloride 0.9%)  1,000 mls @ 999 mls/hr IV .Q1H1M STA


   Stop: 07/01/18 00:32


   Last Admin: 06/30/18 23:40  Dose: 999 mls/hr





eMAR Start Stop


 Document     06/30/18 23:40  RG  (Rec: 06/30/18 23:40  [facility] [id number])


     Intravenous Solution


      Start Date                                 06/30/18


      Start Time                                 23:40





Morphine Sulfate (Morphine)  2 mg IVP STAT STA


   Stop: 06/30/18 20:33


   Last Admin: 06/30/18 21:29  Dose: 2 mg





MAR Pain Assessment


 Document     06/30/18 21:29  RG  (Rec: 06/30/18 21:39  [facility] [id number])


     Pain Reassessment


      Is this a pain reassessment?               Yes


     Sleep


      Is patient sleeping during reassessment?   No


     Presence of Pain


      Presence of Pain                           Yes


     Location


      Upper or Lower                             Upper


      Pain Location Body Site                    Abdomen


IVP Administration


 Document     06/30/18 21:29  RG  (Rec: 06/30/18 21:39  [facility] [id number])


     Charges for Administration


      # of IVP Administrations                   1


Re-Assess: MAR Pain Assessment


 Document     06/30/18 22:29    (Rec: 07/01/18 03:46  [facility] [id number])


     Pain Reassessment


      Is this a pain reassessment?               Yes


     Sleep


      Is patient sleeping during reassessment?   Yes





Ondansetron HCl (Zofran Inj)  4 mg IVP ONCE ONE


   Stop: 06/30/18 20:33


   Last Admin: 06/30/18 21:30  Dose: 4 mg





IVP Administration


 Document     06/30/18 21:30  RG  (Rec: 06/30/18 21:47  [facility] [id number])


     Charges for Administration


      # of IVP Administrations                   1





Ondansetron HCl (Zofran Inj)  4 mg IVP ONCE ONE


   Stop: 07/01/18 00:03


   Last Admin: 07/01/18 00:05  Dose: 4 mg





IVP Administration


 Document     07/01/18 00:05    (Rec: 07/01/18 03:21  [facility] [id number])


     Charges for Administration


      # of IVP Administrations                   1














- Scribe Statement


The provider has reviewed the documentation as recorded by the Scribe


Karie Montoya





All medical record entries made by the Scribe were at my direction and 

personally dictated by me. I have reviewed the chart and agree that the record 

accurately reflects my personal performance of the history, physical exam, 

medical decision making, and the department course for this patient. I have 

also personally directed, reviewed, and agree with the discharge instructions 

and disposition.











Disposition/Present on Arrival





- Present on Arrival


Any Indicators Present on Arrival: No


History of DVT/PE: No


History of Uncontrolled Diabetes: No


Urinary Catheter: No


History of Decub. Ulcer: No


History Surgical Site Infection Following: None





- Disposition


Have Diagnosis and Disposition been Completed?: Yes


Diagnosis: 


 Abdominal pain, Abdominal mass, Sepsis





Disposition: HOSPITALIZED


Disposition Time: 03:50


Patient Plan: Admission


Patient Problems: 


 Current Active Problems











Problem Status Onset


 


Abdominal mass Acute  


 


Abdominal pain Acute  


 


Sepsis Acute  











Condition: GUARDED


Discharge Instructions (ExitCare):  Sepsis (ED)


Referrals: 


Javan Goldsmith MD [Primary Care Provider] - Follow up with primary


Forms:  [employer] (English)

## 2018-07-01 LAB
ALBUMIN SERPL-MCNC: 3.5 G/DL (ref 3–4.8)
ALBUMIN SERPL-MCNC: 3.5 G/DL (ref 3–4.8)
ALBUMIN/GLOB SERPL: 1.1 {RATIO} (ref 1.1–1.8)
ALBUMIN/GLOB SERPL: 1.1 {RATIO} (ref 1.1–1.8)
ALT SERPL-CCNC: 35 U/L (ref 7–56)
ALT SERPL-CCNC: 65 U/L (ref 7–56)
APPEARANCE UR: (no result)
ARTERIAL BLOOD GAS O2 SAT: 99.2 % (ref 95–98)
ARTERIAL BLOOD GAS PCO2: 26 MM/HG (ref 35–45)
ARTERIAL BLOOD GAS TCO2: 16.2 MMOL.L (ref 22–28)
AST SERPL-CCNC: 132 U/L (ref 14–36)
AST SERPL-CCNC: 68 U/L (ref 14–36)
BACTERIA #/AREA URNS HPF: (no result) /[HPF]
BASE EXCESS BLDV CALC-SCNC: -5.9 MMOL/L (ref 0–2)
BASE EXCESS BLDV CALC-SCNC: -9.2 MMOL/L (ref 0–2)
BASE EXCESS BLDV CALC-SCNC: -9.2 MMOL/L (ref 0–2)
BASE EXCESS BLDV CALC-SCNC: -9.4 MMOL/L (ref 0–2)
BILIRUB UR-MCNC: (no result) MG/DL
BNP SERPL-MCNC: 204 PG/ML (ref 0–450)
BUN SERPL-MCNC: 36 MG/DL (ref 7–21)
BUN SERPL-MCNC: 38 MG/DL (ref 7–21)
BUN SERPL-MCNC: 47 MG/DL (ref 7–21)
CALCIUM SERPL-MCNC: 8.4 MG/DL (ref 8.4–10.5)
CALCIUM SERPL-MCNC: 8.7 MG/DL (ref 8.4–10.5)
CALCIUM SERPL-MCNC: 8.8 MG/DL (ref 8.4–10.5)
COLOR UR: YELLOW
ERYTHROCYTE [DISTWIDTH] IN BLOOD BY AUTOMATED COUNT: 17.2 % (ref 11.5–14.5)
ERYTHROCYTE [DISTWIDTH] IN BLOOD BY AUTOMATED COUNT: 17.5 % (ref 11.5–14.5)
GFR NON-AFRICAN AMERICAN: 16
GFR NON-AFRICAN AMERICAN: 21
GFR NON-AFRICAN AMERICAN: 24
GLUCOSE UR STRIP-MCNC: NEGATIVE MG/DL
HCO3 BLDA-SCNC: 15.4 MMOL/L (ref 21–28)
HGB BLD-MCNC: 8.2 G/DL (ref 12–16)
HGB BLD-MCNC: 8.6 G/DL (ref 12–16)
INHALED O2 CONCENTRATION: 28 %
LEUKOCYTE ESTERASE UR-ACNC: (no result) LEU/UL
MCH RBC QN AUTO: 28.9 PG (ref 25–35)
MCH RBC QN AUTO: 28.9 PG (ref 25–35)
MCHC RBC AUTO-ENTMCNC: 32.6 G/DL (ref 31–37)
MCHC RBC AUTO-ENTMCNC: 32.7 G/DL (ref 31–37)
MCV RBC AUTO: 88.4 FL (ref 80–105)
MCV RBC AUTO: 88.6 FL (ref 80–105)
PH BLDA: 7.38 [PH] (ref 7.35–7.45)
PH BLDV: 7.28 [PH] (ref 7.32–7.43)
PH BLDV: 7.3 [PH] (ref 7.32–7.43)
PH UR STRIP: 6 [PH] (ref 4.7–8)
PLATELET # BLD: 283 10^3/UL (ref 120–450)
PLATELET # BLD: 361 10^3/UL (ref 120–450)
PMV BLD AUTO: 9.1 FL (ref 7–11)
PMV BLD AUTO: 9.2 FL (ref 7–11)
PO2 BLDA: 116 MM/HG (ref 80–100)
PROT UR STRIP-MCNC: 100 MG/DL
RBC # BLD AUTO: 2.84 10^6/UL (ref 3.5–6.1)
RBC # BLD AUTO: 2.98 10^6/UL (ref 3.5–6.1)
RBC # UR STRIP: (no result) /UL
SP GR UR STRIP: >= 1.03 (ref 1–1.03)
TROPONIN I SERPL-MCNC: < 0.01 NG/ML
TROPONIN I SERPL-MCNC: < 0.01 NG/ML
UROBILINOGEN UR STRIP-ACNC: 1 E.U./DL
VENOUS BLOOD FIO2: 21 %
VENOUS BLOOD GAS PCO2: 33 (ref 40–60)
VENOUS BLOOD GAS PCO2: 33 (ref 40–60)
VENOUS BLOOD GAS PCO2: 35 (ref 40–60)
VENOUS BLOOD GAS PCO2: 41 (ref 40–60)
VENOUS BLOOD GAS PO2: 121 MM/HG (ref 30–55)
VENOUS BLOOD GAS PO2: 32 MM/HG (ref 30–55)
VENOUS BLOOD GAS PO2: 35 MM/HG (ref 30–55)
VENOUS BLOOD GAS PO2: 52 MM/HG (ref 30–55)
WBC # BLD AUTO: 25.5 10^3/UL (ref 4.5–11)
WBC # BLD AUTO: 32.9 10^3/UL (ref 4.5–11)

## 2018-07-01 RX ADMIN — INSULIN HUMAN SCH: 100 INJECTION, SOLUTION PARENTERAL at 08:23

## 2018-07-01 RX ADMIN — METRONIDAZOLE SCH MLS/HR: 500 INJECTION, SOLUTION INTRAVENOUS at 07:39

## 2018-07-01 RX ADMIN — MEROPENEM SCH MLS/HR: 1 INJECTION INTRAVENOUS at 22:04

## 2018-07-01 RX ADMIN — METRONIDAZOLE SCH MLS/HR: 500 INJECTION, SOLUTION INTRAVENOUS at 22:04

## 2018-07-01 RX ADMIN — LINEZOLID SCH MLS/HR: 600 INJECTION, SOLUTION INTRAVENOUS at 23:52

## 2018-07-01 RX ADMIN — METRONIDAZOLE SCH MLS/HR: 500 INJECTION, SOLUTION INTRAVENOUS at 13:21

## 2018-07-01 RX ADMIN — SIMETHICONE CHEW TAB 80 MG SCH MG: 80 TABLET ORAL at 17:19

## 2018-07-01 RX ADMIN — INSULIN HUMAN SCH UNITS: 100 INJECTION, SOLUTION PARENTERAL at 17:20

## 2018-07-01 RX ADMIN — INSULIN HUMAN SCH: 100 INJECTION, SOLUTION PARENTERAL at 11:36

## 2018-07-01 RX ADMIN — LINEZOLID SCH MLS/HR: 600 INJECTION, SOLUTION INTRAVENOUS at 10:40

## 2018-07-01 RX ADMIN — METOPROLOL TARTRATE PRN MG: 5 INJECTION INTRAVENOUS at 13:22

## 2018-07-01 RX ADMIN — MORPHINE SULFATE PRN MG: 2 INJECTION, SOLUTION INTRAMUSCULAR; INTRAVENOUS at 17:19

## 2018-07-01 RX ADMIN — MORPHINE SULFATE PRN MG: 2 INJECTION, SOLUTION INTRAMUSCULAR; INTRAVENOUS at 09:55

## 2018-07-01 RX ADMIN — MEROPENEM SCH MLS/HR: 1 INJECTION INTRAVENOUS at 10:39

## 2018-07-01 RX ADMIN — METOPROLOL TARTRATE PRN MG: 5 INJECTION INTRAVENOUS at 18:29

## 2018-07-01 RX ADMIN — SIMETHICONE CHEW TAB 80 MG SCH: 80 TABLET ORAL at 22:04

## 2018-07-01 RX ADMIN — SIMETHICONE CHEW TAB 80 MG SCH MG: 80 TABLET ORAL at 13:21

## 2018-07-01 NOTE — CARD
--------------- APPROVED REPORT --------------





EKG Measurement

Heart [id number]

IN [id number]

[id number]

[id number]

[id number]



<Conclusion>

Sinus tachycardia

Minimal voltage criteria for LVH, may be normal variant

Borderline ECG

## 2018-07-01 NOTE — CP.PCM.PCO
Assessment & Plan





- Assessment and Plan (Free Text)


Assessment: 





patient underwent extensive debulking surgery at [facility] with Gynec Onc. 

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 She 

had complicated course post-op with pleural effusions, ascites, respiratory 

distress. 


Renal functions improved to normal post -op. 





aranesp 100 mcgm today, sepsis management. 


Blood products as needed.

## 2018-07-01 NOTE — CP.PCM.CON
<GabbyMeagan - Last Filed: 07/01/18 04:57>





History of Present Illness





- History of Present Illness


History of Present Illness: 





PGY-2 for Dr Yates





ICU consult: sepsis with oliguric, SOB





Ms Adams, 62 F, Hx of ovarian cancer on carboplatin/taxol 2 weeks ago, s/p 

debulking surgery 2 months ago, with chemo-induced anemia s/p 2u pRBC 2 weeks 

ago, was transferred from rehab ("the [place]") to [facility] ED for sweating, nausea/

vomiting, and worsening of general malaise. After the debulking surgery at [email or web address]], pt was in rehab. She participated in rehab 2 days ago. However, 

yesterday she was so tired that she "collapse" and could not participates in 

rehab. Pt noted that she did not urinate for the whole day. Today, she could 

not get out of bed. She also has pressure like abdominal pain, constant, 

associated with N/V.





ROS: (+) sweating (+) N/V/Abd pain (+) SOB (+) tiredness (+) oliguria


Denies fever, chills, chest pain, diarrhea, back pain, neck pain, headache, 

dizziness. 





CT A/P (6/30/18): Marked progression of disease, 3 new pulmonary nodules; New 

peritoneal carcinomatosis; multiple complex masses in the abdomen and pelvis; 

loculated fluid collection with fluid fluid level suggesting


layering blood products; possible early/developing small bowel obstruction; 

mild bilateral ureterectasis; gallstones.





PET CT (5/2018): 


- large heterogenous mass in pelvis (sepation, cyst, necrotic) 13.4cm x 11.5cm 

x 9.6cm


- Ascites in abdomen/pelvis


- medium size R effusion


- 9mm nodule L lower lobe





PMH


   Metastatic ovarian cancer, large pelvic mass


   - started weekly carboplatin/Taxol


   - chemo 2 week


   HTN


   Meniere disease


   DM 2, A1C________


   Cholelithiasis


   Hx peritonitis


   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





[facility]


   Rotator cuff surgery, 2010


   Debulking surgery & hysterectomy, April/May 2018


   - [facility]


   - Dr Sabillon"


   - complicated surgical course requireing ICU x 2 weeks; Ascites, respiratory 

distress, bilateral pleural effusion drainage





FH


   DM, CAD





SH


   Never smoke





All


   Penicillin - rash


   Tylenol side effect 


   Aspirin side effect = stomach burn


   Oxycodone/percocet = stroke like symotoms





Med     simethacone, ramipril 10, zofran ODT, 


   insulin, dilaudid, fentynl, pepcid, ventolin, tylenol











Past Patient History





- Infectious Disease


Hx of Infectious Diseases: None





- Past Social History


Smoking Status: Never Smoked





- CARDIAC


Hx Cardiac Disorders: Yes


Hx Hypertension: Yes





- PULMONARY


Hx Respiratory Disorders: No





- NEUROLOGICAL


Hx Neurological Disorder: No





- HEENT


Hx HEENT Problems: Yes


Hx Blind: Yes (left eye)


Other/Comment: Meniere's Disease





- RENAL


Hx Chronic Kidney Disease: No





- ENDOCRINE/METABOLIC


Hx Diabetes Mellitus Type 2: Yes





- HEMATOLOGICAL/ONCOLOGICAL


Hx Blood Disorders: No





- INTEGUMENTARY


Hx Dermatological Problems: No





- MUSCULOSKELETAL/RHEUMATOLOGICAL


Hx Falls: No





- GASTROINTESTINAL


Hx Gastrointestinal Disorders: Yes


Other/Comment: Peritonitis





- GENITOURINARY/GYNECOLOGICAL


Hx Genitourinary Disorders: No





- PSYCHIATRIC


Hx Psychophysiologic Disorder: No


Hx Substance Use: No





- SURGICAL HISTORY


Hx Orthopedic Surgery: Yes (right rotator cuff)


Other/Comment: pelvic mass removed lap.





- ANESTHESIA


Hx Anesthesia: Yes


Hx Anesthesia Reactions: No


Hx Malignant Hyperthermia: No





Meds


Allergies/Adverse Reactions: 


 Allergies











Allergy/AdvReac Type Severity Reaction Status Date / Time


 


Penicillins Allergy  URTICARIA Verified 06/21/18 11:43


 


acetaminophen [From Percocet] AdvReac  DIZZINESS Verified 06/21/18 11:43


 


aspirin AdvReac  PAIN Verified 06/21/18 11:43


 


oxycodone AdvReac  DIZZINESS Verified 06/21/18 11:43














Physical Exam





- Constitutional


Appears: Non-toxic


Additional comments: 





able to complete full sentences





- Head Exam


Head Exam: ATRAUMATIC, NORMAL INSPECTION, NORMOCEPHALIC





- Eye Exam


Eye Exam: EOMI, Normal appearance, PERRL.  absent: Scleral icterus


Pupil Exam: NORMAL ACCOMODATION





- ENT Exam


ENT Exam: Mucous Membranes Moist





- Neck Exam


Additional comments: 





supple, no jvd





- Respiratory Exam


Respiratory Exam: Decreased Breath Sounds (lung bases), Clear to Auscultation 

Bilateral.  absent: Rales, Rhonchi, Wheezes





- Cardiovascular Exam


Cardiovascular Exam: Tachycardia, REGULAR RHYTHM, +S1, +S2.  absent: Systolic 

Murmur





- GI/Abdominal Exam


GI & Abdominal Exam: Diminished Bowel Sounds, Tenderness (epigastric, all 

quadrants upon deep palpation).  absent: Distended





- Extremities Exam


Extremities exam: Positive for: normal capillary refill, pedal edema (slight), 

pedal pulses present





- Psychiatric Exam


Psychiatric exam: Normal Affect, Normal Mood





- Skin


Skin Exam: Dry, Warm


Additional comments: 





not diaphoretic





Results





- Vital Signs


Recent Vital Signs: 


 Last Vital Signs











Temp  98.1 F   07/01/18 00:55


 


Pulse  122 H  07/01/18 00:55


 


Resp  18   07/01/18 00:55


 


BP  126/74   07/01/18 00:55


 


Pulse Ox  100   07/01/18 00:55














- Labs


Result Diagrams: 


 06/30/18 21:32





 07/01/18 03:08


Labs: 


 Laboratory Results - last 24 hr











  06/30/18 06/30/18 06/30/18





  21:32 21:32 21:32


 


WBC  22.3 H D  


 


RBC  3.25 L  


 


Hgb  9.3 L  


 


Hct  28.8 L  


 


MCV  88.6  


 


MCH  28.6  


 


MCHC  32.3  


 


RDW  16.9 H  


 


Plt Count  359  


 


MPV  9.4  


 


Gran %  80.3 H  


 


Lymph % (Auto)  10.8 L  


 


Mono % (Auto)  8.5 H  


 


Eos % (Auto)  0.0 L  


 


Baso % (Auto)  0.4  


 


Gran #  17.92 H  


 


Lymph # (Auto)  2.4  


 


Mono # (Auto)  1.9 H  


 


Eos # (Auto)  0.0  


 


Baso # (Auto)  0.08  


 


PT   12.6 H 


 


INR   1.10 H 


 


APTT   24.1 L 


 


pO2    61 H


 


VBG pH    7.30 L


 


VBG pCO2    39.0 L


 


VBG HCO3    19.2 L


 


VBG Total CO2    20.4 L


 


VBG O2 Sat (Calc)    93.7 H


 


VBG Base Excess    -6.7 L


 


VBG Potassium    5.1


 


Sodium    131.0 L


 


Chloride    100.0


 


Glucose    271 H


 


Lactate    3.1 H


 


FiO2    21.0


 


Potassium   


 


Carbon Dioxide   


 


Anion Gap   


 


BUN   


 


Creatinine   


 


Est GFR ( Amer)   


 


Est GFR (Non-Af Amer)   


 


Random Glucose   


 


Calcium   


 


Total Bilirubin   


 


AST   


 


ALT   


 


Alkaline Phosphatase   


 


Lactate Dehydrogenase   


 


Total Creatine Kinase   


 


Troponin I   


 


Total Protein   


 


Albumin   


 


Globulin   


 


Albumin/Globulin Ratio   


 


Venous Blood Potassium    5.1


 


Urine Color   


 


Urine Appearance   


 


Urine pH   


 


Ur Specific Gravity   


 


Urine Protein   


 


Urine Glucose (UA)   


 


Urine Ketones   


 


Urine Blood   


 


Urine Nitrate   


 


Urine Bilirubin   


 


Urine Urobilinogen   


 


Ur Leukocyte Esterase   


 


Urine RBC   


 


Urine WBC   


 


Ur Epithelial Cells   


 


Urine Bacteria   














  06/30/18 07/01/18 07/01/18





  21:32 00:24 01:10


 


WBC   


 


RBC   


 


Hgb   


 


Hct   


 


MCV   


 


MCH   


 


MCHC   


 


RDW   


 


Plt Count   


 


MPV   


 


Gran %   


 


Lymph % (Auto)   


 


Mono % (Auto)   


 


Eos % (Auto)   


 


Baso % (Auto)   


 


Gran #   


 


Lymph # (Auto)   


 


Mono # (Auto)   


 


Eos # (Auto)   


 


Baso # (Auto)   


 


PT   


 


INR   


 


APTT   


 


pO2    121 H


 


VBG pH    7.30 L


 


VBG pCO2    41.0


 


VBG HCO3    20.2 L


 


VBG Total CO2    21.5 L


 


VBG O2 Sat (Calc)    99.3 H


 


VBG Base Excess    -5.9 L


 


VBG Potassium    5.0


 


Sodium  132   133.0


 


Chloride  97 L   103.0


 


Glucose    192 H


 


Lactate    1.9


 


FiO2    21.0


 


Potassium  5.1 H  


 


Carbon Dioxide  17 L  


 


Anion Gap  23 H  


 


BUN  36 H  


 


Creatinine  2.1 H  


 


Est GFR ( Amer)  29  


 


Est GFR (Non-Af Amer)  24  


 


Random Glucose  252 H  


 


Calcium  9.4  


 


Total Bilirubin  0.7  


 


AST  26  


 


ALT  25  


 


Alkaline Phosphatase  115  


 


Lactate Dehydrogenase  831 H  


 


Total Creatine Kinase  38  


 


Troponin I  < 0.01  


 


Total Protein  6.9  


 


Albumin  3.7  


 


Globulin  3.3  


 


Albumin/Globulin Ratio  1.1  


 


Venous Blood Potassium    5.0


 


Urine Color   Yellow 


 


Urine Appearance   Sl cloudy 


 


Urine pH   6.0 


 


Ur Specific Gravity   >= 1.030 


 


Urine Protein   100 H 


 


Urine Glucose (UA)   Negative 


 


Urine Ketones   15 H 


 


Urine Blood   Trace-intact H 


 


Urine Nitrate   Negative 


 


Urine Bilirubin   Small H 


 


Urine Urobilinogen   1.0 H 


 


Ur Leukocyte Esterase   Trace H 


 


Urine RBC   1 - 3 


 


Urine WBC   2 - 5 


 


Ur Epithelial Cells   1 - 3 


 


Urine Bacteria   Mod 














Assessment & Plan





- Assessment and Plan (Free Text)


Plan: 





Ms Adams, 62 F, Hx of ovarian cancer on carboplatin/taxol 2 weeks ago, s/p 

debulking surgery 2 months ago, with chemo-induced anemia s/p 2u pRBC 2 weeks 

ago, was transferred from rehab ("[facility]") to [facility] ED for sweating, nausea/

vomiting, and worsening of general malaise, and oliguria. ICU consulted for 

severe sepsis with BRYAN and oliguria, SOB





Pt does not meet ICU admission criteria: hemodynamically stable. Lactate  

trending down from 3.1 to 1.9. No signs of shock. 





Sepsis likely from UTI vs intraabdomen infection


Sinus tachycardia


- Agree with aztreonam and flagyl


- ID consult


- lactate 3.1 to 1.9 after 3L NS fluid resuscitation





BRYAN with Oliguria


mild bilateral ureterectasis


- bladder scan 0cc


- ketone in urine


- pre-renal vs intrinsic vs post-renal?


- NS@100 for now -- pending result of lab to determine if need bicarb gtt





Sinus tachycardia


Diaphoresis


On chronic dilaudid


- Any chance to opioid withdraw??


- continue to trend trops and serial EKG in AM 





Anion gap metabolic acidosis likely from lactic acidosis. Unlikely DKA


Suspect SOB is from decrease diaphragmatic excursion due to mass effect in 

abdomen vs. respiratory compensation


- Pending ABG with lactate


- Pending redrawn lab result to see if bicarb gtt or not vs NS





Abdominal pain with nausea and vomiting


Possible SBO


- NPO


- Zofran PRN


- morphine 1q3 prn


- surgery consult





Hx ovarian cancer


- Onc consult





PVX - protonix, heparin sc





s/r/d/w Dr. Yates








<Juan Miguel Yates - Last Filed: 07/01/18 06:21>





Meds





- Medications


Medications: 


 Current Medications





Heparin Sodium (Porcine) (Heparin)  5,000 units SC Q8 SANDY


   PRN Reason: Protocol


Metronidazole (Flagyl)  500 mg in 100 mls @ 100 mls/hr IVPB Q8 SANDY


   PRN Reason: Protocol


Sodium Chloride (Sodium Chloride 0.9%)  1,000 mls @ 100 mls/hr IV .Q10H [facility]


   Last Admin: 07/01/18 04:44 Dose:  100 mls/hr


Insulin Human Regular (Humulin R Med)  0 units SC Q6 SANDY


   PRN Reason: Protocol


Ipratropium Bromide (Atrovent)  0.5 mg IH Q3 PRN


   PRN Reason: Shortness of Breath


Levalbuterol HCl (Xopenex)  1.25 mg IH Q3 PRN


   PRN Reason: Shortness of Breath


Morphine Sulfate (Morphine)  1 mg IVP Q3 PRN


   PRN Reason: Pain, moderate (4-7)


Ondansetron HCl (Zofran Inj)  4 mg IVP Q6H PRN


   PRN Reason: Nausea/Vomiting


   Last Admin: 07/01/18 04:00 Dose:  4 mg











Results





- Vital Signs


Recent Vital Signs: 


 Last Vital Signs











Temp  97.7 F   07/01/18 05:11


 


Pulse  132 H  07/01/18 05:11


 


Resp  18   07/01/18 05:11


 


BP  137/87   07/01/18 05:11


 


Pulse Ox  100   07/01/18 05:11














- Labs


Result Diagrams: 


 06/30/18 21:32





 07/01/18 03:08


Labs: 


 Laboratory Results - last 24 hr











  07/01/18





  04:45


 


pCO2  26 L


 


pO2  116.0 H


 


HCO3  15.4 L


 


ABG pH  7.38


 


ABG Total CO2  16.2 L


 


ABG O2 Saturation  99.2 H


 


ABG Base Excess  -8.0 L


 


ABG Potassium  4.9


 


Sodium  133.0


 


Chloride  106.0


 


Glucose  260 H


 


Lactate  2.3 H


 


FiO2  28.0


 


Arterial Blood Potassium  4.9














Attending/Attestation





- Attestation


I have personally seen and examined this patient.: Yes


I have fully participated in the care of the patient.: Yes


I have reviewed all pertinent clinical information: Yes


Notes (Text): 





07/01/18 06:14


Patient was seen when she was in the ER.


Agree with consult note.

## 2018-07-01 NOTE — CARD
--------------- APPROVED REPORT --------------





EKG Measurement

Heart [id number]

ID [id number]

[id number]

[id number]

[id number]



<Conclusion>

Sinus tachycardia

Minimal voltage criteria for LVH, may be normal variant

Borderline ECG

## 2018-07-01 NOTE — PCM.SEPTIC
Sepsis Progress Note





- Reassessment Type


Date of Evaluation: 07/01/18


Time of Evaluation: 03:30


Reassessment Type: Non-invasive reassessment





- Non Invasive Reassessment


Were the most recent vital sign reviewed: Yes


Vital Sign (Latest): 


 











Temp Pulse Resp BP Pulse Ox


 


 97.7 F   132 H  18   137/87   100 


 


 07/01/18 05:11  07/01/18 05:11  07/01/18 05:11  07/01/18 05:11  07/01/18 05:11








Cardiovascular: Yes: Tachycardia


Respiratory: Yes: Decreased Breath Sounds


Capillary Refill: Normal (Less than 2 sec)


Pulses: Normal Radial, Normal Dorsalis Pedis, Normal Posterior Tibialis


Skin: Warm





- Invasive Reassessment (complete 2 of 4)


Was a Central Venous Pressure Measurement obtained within 6 Hours after the 

presentation of septic shock: No


Was a central venous oxygen measurement obtained within 6 hours after the 

presentation of septic shock: No


Was a bedside cardiovascular ultrasound performed within 6 hours after the 

presentation of septic shock: No


Was a passive leg raise performed or was a fluid challenge performed within 6 

hrs of the initial fluid bolus: Yes


Fluid Challenge performed: Yes

## 2018-07-01 NOTE — CT
EXAM:

  CT Abdomen and Pelvis Without Intravenous Contrast



EXAM DATE/TIME:

  6/30/2018 8:33 PM



CLINICAL HISTORY:

  62 years old, female; Pain; Abdominal pain; Generalized; Prior surgery; 

Surgery date: 6+ months; Surgery type: Status post hysterectomy and 

oophorectomies; Patient HX: HX ovarian ca



TECHNIQUE:

  Axial computed tomography images of the abdomen and pelvis without 

intravenous contrast.  All CT scans at this facility use at least one of these 

dose optimization techniques: automated exposure control; mA and/or kV 

adjustment per patient size (includes targeted exams where dose is matched to 

clinical indication); or iterative reconstruction.

  Coronal and sagittal reformatted images were created and reviewed.



COMPARISON:

  CT - ABD   PELVIS IV CONTRAST ONLY 2018-04-24 17:22



FINDINGS:



   Limitation: Evaluation of the abdomen and pelvis is limited by lack of oral 

and intravenous contrast   



   Lower thorax:  Heart size is normal. There is decreased attenuation of blood 

pool relative to myocardium. There is a new 6 mm peripheral right lower lobe 

pulmonary nodule. There is a new 8mm right lower lobe pulmonary nodule along 

the diaphragmatic surface. There is a new 8.2 mm left lower lobe pulmonary 

nodule



 ABDOMEN:

  Liver:  There are subcapsular hepatic masses versus fluid collections along 

the inferior right lobe of the liver.

  Gallbladder and bile ducts:  The gallbladder is distended with multiple 

stones. Increased attenuation of bile suggest milk of calcium. Common duct is 

unremarkable.

  Pancreas:  Pancreas is atrophic.

  Spleen:  unremarkable

  Adrenals:  unremarkable

  Kidneys and ureters:  Kidneys are normal in size. There is been interval 

development of mild bilateral ureterectasis right greater than left.

  Stomach and bowel:  Stomach is partially distended with an air-fluid level. 

Rotation is normal. There are dilated small bowel loops in the left upper 

quadrant and left flank. There air-fluid levels. Mid and distal small bowel is 

difficult to identify and evaluate. Ileocecal region is poorly visualized. 

Transverse colon is displaced but masses in the upper abdomen. Descending colon 

is decompressed.



 PELVIS:

  Appendix:  See stomach and bowel

  Bladder:  Bladder is almost empty.

  Reproductive:  Uterus and ovaries have been removed.



 ABDOMEN and PELVIS:

  Intraperitoneal space:  There is a large fluid collection in the pelvis. 

There are fluid fluid levels suggesting layering blood. There are multiple 

solid nodules in the mesentery and omentum. There are multiple cystic masses in 

the abdomen and pelvis

  Bones/joints:  There are degenerative changes in the osseus structures.





  Soft tissues:  There is body wall edema. 

  Lymph nodes: There are enlarged left iliac nodes.



IMPRESSION:  Marked progression of disease, 3 new pulmonary nodules suspicious 

for metastases; interval development of peritoneal carcinomatosis; multiple 

complex masses in the abdomen and pelvis consistent with metastatic deposits; 

loculated fluid collection with fluid fluid level suggesting layering blood 

products; probable anemia; possible early/developing small bowel obstruction; 

mild bilateral ureterectasis; gallstones







Findings were discussed with Jose Ramon Sarmiento  at  12:48 AM EDT on 

7/1/2018.

## 2018-07-01 NOTE — CP.PCM.CON
History of Present Illness





- History of Present Illness


History of Present Illness: 





General Surgery Consult for Dr. Mcgrath


Reason for Consult: abdominal pain, possible SBO





 62 F with PMH that incudes ovarian cancer on carboplatin/taxol, s/p debulking 

surgery 2 months ago, chemo-induced anemia who was brought in by EMS from [facility] 

to [facility] for nausea/vomiting and abdominal pain. Patient was seen and evaluated 

in the ED. She reports gradual onset of symptoms but abruptly had gotten 

progressively worse.  She reports numerous episodes of nausea/vomiting with 

NBNB emesis prior to arrival. Patient states that since her chemo began she has 

felt extreme malaise. Patient was sent to [facility] after the debulking surgery at 

[facility]. Patient needed [facility] due to her complicated hospital course 

after surgery.  She was in the ICU for 2 weeks, developed Ascites, respiratory 

distress and required bilateral pleural effusion drainage. She also stated she 

"collapsed" during physical therapy yesterday. Prior to yesterday, she had been 

able to participate in physical therapy. Also, she has not been able to urinate 

for one day. Patient has been unable to get out of bed today. She rates pain as 

moderate to severe. She describes pain as constant pressure located diffusely 

throughout abdomen. She denies any specific aggravating or alleviating factors. 

Admits to diaphoresis, SOB, maliase, palpitations, oliguria. Denies fever/chills

, chest pain, diarrhea, headache, dizziness/lightheadedness, vertigo, syncope, 

incontinence, numbness/tingling. CT abd/pelvis was done in the ED and 

demonstrated marked progression of disease, 3 new pulmonary nodules; New 

peritoneal carcinomatosis; multiple complex masses in the abdomen and pelvis; 

loculated fluid collection with fluid level suggesting layering blood products; 

possible early/developing small bowel obstruction; mild bilateral ureterectasis

; gallstones.





PMH: Metastatic ovarian cancer and large pelvic mass on chemo, HTN, Meniere 

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


Med: simethacone, ramipril, zofran,insulin, dilaudid, fentynl, pepcid, ventolin

, tylenol


All: PCN - rash, Tylenol, Aspirin, Oxycodone - stroke like symptoms


PSH: Rotator cuff surgery (2010), Debulking surgery & hysterectomy (April/May 

2018 at [facility] by Dr Logan)


FH: DM, CAD


Social: denies tobacco/EtOH/illicit drug use





Review of Systems





- Review of Systems


All systems: reviewed and no additional remarkable complaints except (as per HPI

)





Past Patient History





- Infectious Disease


Hx of Infectious Diseases: None





- Past Social History


Smoking Status: Never Smoked





- CARDIAC


Hx Cardiac Disorders: Yes


Hx Hypertension: Yes





- PULMONARY


Hx Respiratory Disorders: No





- NEUROLOGICAL


Hx Neurological Disorder: No





- HEENT


Hx HEENT Problems: Yes


Hx Blind: Yes (left eye)


Other/Comment: Meniere's Disease





- RENAL


Hx Chronic Kidney Disease: No





- ENDOCRINE/METABOLIC


Hx Diabetes Mellitus Type 2: Yes





- HEMATOLOGICAL/ONCOLOGICAL


Hx Blood Disorders: No





- INTEGUMENTARY


Hx Dermatological Problems: No





- MUSCULOSKELETAL/RHEUMATOLOGICAL


Hx Falls: No





- GASTROINTESTINAL


Hx Gastrointestinal Disorders: Yes


Other/Comment: Peritonitis





- GENITOURINARY/GYNECOLOGICAL


Hx Genitourinary Disorders: No





- PSYCHIATRIC


Hx Psychophysiologic Disorder: No


Hx Substance Use: No





- SURGICAL HISTORY


Hx Orthopedic Surgery: Yes (right rotator cuff)


Other/Comment: pelvic mass removed lap.





- ANESTHESIA


Hx Anesthesia: Yes


Hx Anesthesia Reactions: No


Hx Malignant Hyperthermia: No





Meds


Allergies/Adverse Reactions: 


 Allergies











Allergy/AdvReac Type Severity Reaction Status Date / Time


 


Penicillins Allergy  URTICARIA Verified 06/21/18 11:43


 


acetaminophen [From Percocet] AdvReac  DIZZINESS Verified 06/21/18 11:43


 


aspirin AdvReac  PAIN Verified 06/21/18 11:43


 


oxycodone AdvReac  DIZZINESS Verified 06/21/18 11:43














Physical Exam





- Additional Findings


Additional findings: 


- Constitutional


Appears: Non-toxic





- Head Exam


Head Exam: ATRAUMATIC, NORMAL INSPECTION, NORMOCEPHALIC





- Eye Exam


Eye Exam: EOMI, Normal appearance, PERRL.  absent: Scleral icterus


Pupil Exam: NORMAL ACCOMODATION





- ENT Exam


ENT Exam: Mucous Membranes Moist





- Neck Exam


Additional comments: 





trachea midline, no jvd





- Respiratory Exam


Respiratory Exam: Decreased Breath Sounds (lung bases), Clear to Auscultation 

Bilateral.  absent: Rales, Rhonchi, Wheezes





- Cardiovascular Exam


Cardiovascular Exam: Tachycardia, REGULAR RHYTHM, +S1, +S2.  absent: Systolic 

Murmur





- GI/Abdominal Exam


GI & Abdominal Exam: Diminished Bowel Sounds, Soft, Mass, Tenderness (diffuse), 

Distended





- Extremities Exam


Extremities exam: Positive for: normal capillary refill, pedal edema (slight), 

pedal pulses present





- Psychiatric Exam


Psychiatric exam: Normal Affect, Normal Mood





- Skin


Skin Exam: Dry, Warm





Results





- Vital Signs


Recent Vital Signs: 


 Last Vital Signs











Temp  98.1 F   07/01/18 00:55


 


Pulse  122 H  07/01/18 00:55


 


Resp  18   07/01/18 00:55


 


BP  126/74   07/01/18 00:55


 


Pulse Ox  100   07/01/18 00:55














- Labs


Result Diagrams: 


 07/01/18 07:50





 07/01/18 07:50


Labs: 


 Laboratory Results - last 24 hr











  06/30/18 06/30/18 06/30/18





  21:32 21:32 21:32


 


WBC  22.3 H D  


 


RBC  3.25 L  


 


Hgb  9.3 L  


 


Hct  28.8 L  


 


MCV  88.6  


 


MCH  28.6  


 


MCHC  32.3  


 


RDW  16.9 H  


 


Plt Count  359  


 


MPV  9.4  


 


Gran %  80.3 H  


 


Lymph % (Auto)  10.8 L  


 


Mono % (Auto)  8.5 H  


 


Eos % (Auto)  0.0 L  


 


Baso % (Auto)  0.4  


 


Gran #  17.92 H  


 


Lymph # (Auto)  2.4  


 


Mono # (Auto)  1.9 H  


 


Eos # (Auto)  0.0  


 


Baso # (Auto)  0.08  


 


PT   12.6 H 


 


INR   1.10 H 


 


APTT   24.1 L 


 


pO2    61 H


 


VBG pH    7.30 L


 


VBG pCO2    39.0 L


 


VBG HCO3    19.2 L


 


VBG Total CO2    20.4 L


 


VBG O2 Sat (Calc)    93.7 H


 


VBG Base Excess    -6.7 L


 


VBG Potassium    5.1


 


Sodium    131.0 L


 


Chloride    100.0


 


Glucose    271 H


 


Lactate    3.1 H


 


FiO2    21.0


 


Potassium   


 


Carbon Dioxide   


 


Anion Gap   


 


BUN   


 


Creatinine   


 


Est GFR ( Amer)   


 


Est GFR (Non-Af Amer)   


 


POC Glucose (mg/dL)   


 


Random Glucose   


 


Calcium   


 


Total Bilirubin   


 


AST   


 


ALT   


 


Alkaline Phosphatase   


 


Lactate Dehydrogenase   


 


Total Creatine Kinase   


 


Troponin I   


 


Total Protein   


 


Albumin   


 


Globulin   


 


Albumin/Globulin Ratio   


 


Venous Blood Potassium    5.1


 


Urine Color   


 


Urine Appearance   


 


Urine pH   


 


Ur Specific Gravity   


 


Urine Protein   


 


Urine Glucose (UA)   


 


Urine Ketones   


 


Urine Blood   


 


Urine Nitrate   


 


Urine Bilirubin   


 


Urine Urobilinogen   


 


Ur Leukocyte Esterase   


 


Urine RBC   


 


Urine WBC   


 


Ur Epithelial Cells   


 


Urine Bacteria   














  06/30/18 07/01/18 07/01/18





  21:32 00:24 01:10


 


WBC   


 


RBC   


 


Hgb   


 


Hct   


 


MCV   


 


MCH   


 


MCHC   


 


RDW   


 


Plt Count   


 


MPV   


 


Gran %   


 


Lymph % (Auto)   


 


Mono % (Auto)   


 


Eos % (Auto)   


 


Baso % (Auto)   


 


Gran #   


 


Lymph # (Auto)   


 


Mono # (Auto)   


 


Eos # (Auto)   


 


Baso # (Auto)   


 


PT   


 


INR   


 


APTT   


 


pO2    121 H


 


VBG pH    7.30 L


 


VBG pCO2    41.0


 


VBG HCO3    20.2 L


 


VBG Total CO2    21.5 L


 


VBG O2 Sat (Calc)    99.3 H


 


VBG Base Excess    -5.9 L


 


VBG Potassium    5.0


 


Sodium  132   133.0


 


Chloride  97 L   103.0


 


Glucose    192 H


 


Lactate    1.9


 


FiO2    21.0


 


Potassium  5.1 H  


 


Carbon Dioxide  17 L  


 


Anion Gap  23 H  


 


BUN  36 H  


 


Creatinine  2.1 H  


 


Est GFR ( Amer)  29  


 


Est GFR (Non-Af Amer)  24  


 


POC Glucose (mg/dL)   


 


Random Glucose  252 H  


 


Calcium  9.4  


 


Total Bilirubin  0.7  


 


AST  26  


 


ALT  25  


 


Alkaline Phosphatase  115  


 


Lactate Dehydrogenase  831 H  


 


Total Creatine Kinase  38  


 


Troponin I  < 0.01  


 


Total Protein  6.9  


 


Albumin  3.7  


 


Globulin  3.3  


 


Albumin/Globulin Ratio  1.1  


 


Venous Blood Potassium    5.0


 


Urine Color   Yellow 


 


Urine Appearance   Sl cloudy 


 


Urine pH   6.0 


 


Ur Specific Gravity   >= 1.030 


 


Urine Protein   100 H 


 


Urine Glucose (UA)   Negative 


 


Urine Ketones   15 H 


 


Urine Blood   Trace-intact H 


 


Urine Nitrate   Negative 


 


Urine Bilirubin   Small H 


 


Urine Urobilinogen   1.0 H 


 


Ur Leukocyte Esterase   Trace H 


 


Urine RBC   1 - 3 


 


Urine WBC   2 - 5 


 


Ur Epithelial Cells   1 - 3 


 


Urine Bacteria   Mod 














  07/01/18





  02:05


 


WBC 


 


RBC 


 


Hgb 


 


Hct 


 


MCV 


 


MCH 


 


MCHC 


 


RDW 


 


Plt Count 


 


MPV 


 


Gran % 


 


Lymph % (Auto) 


 


Mono % (Auto) 


 


Eos % (Auto) 


 


Baso % (Auto) 


 


Gran # 


 


Lymph # (Auto) 


 


Mono # (Auto) 


 


Eos # (Auto) 


 


Baso # (Auto) 


 


PT 


 


INR 


 


APTT 


 


pO2 


 


VBG pH 


 


VBG pCO2 


 


VBG HCO3 


 


VBG Total CO2 


 


VBG O2 Sat (Calc) 


 


VBG Base Excess 


 


VBG Potassium 


 


Sodium 


 


Chloride 


 


Glucose 


 


Lactate 


 


FiO2 


 


Potassium 


 


Carbon Dioxide 


 


Anion Gap 


 


BUN 


 


Creatinine 


 


Est GFR ( Amer) 


 


Est GFR (Non-Af Amer) 


 


POC Glucose (mg/dL)  187 H


 


Random Glucose 


 


Calcium 


 


Total Bilirubin 


 


AST 


 


ALT 


 


Alkaline Phosphatase 


 


Lactate Dehydrogenase 


 


Total Creatine Kinase 


 


Troponin I 


 


Total Protein 


 


Albumin 


 


Globulin 


 


Albumin/Globulin Ratio 


 


Venous Blood Potassium 


 


Urine Color 


 


Urine Appearance 


 


Urine pH 


 


Ur Specific Gravity 


 


Urine Protein 


 


Urine Glucose (UA) 


 


Urine Ketones 


 


Urine Blood 


 


Urine Nitrate 


 


Urine Bilirubin 


 


Urine Urobilinogen 


 


Ur Leukocyte Esterase 


 


Urine RBC 


 


Urine WBC 


 


Ur Epithelial Cells 


 


Urine Bacteria 














Assessment & Plan





- Assessment and Plan (Free Text)


Assessment: 





62 F with PMH that incudes ovarian cancer on carboplatin/taxol, s/p debulking 

surgery 2 months ago who presents for nausea/vomiting and abdominal pain; found 

to have 3 new pulmonary nodules; New peritoneal carcinomatosis; multiple 

complex masses in the abdomen and pelvis


Plan: 





-NPO


-IV fluids


-IV antibiotics


-HR control


-Recommend IR and Urology consults


-Analgesics/Anti-emetics


-Strict I's & O's


-Serial abdominal exams


-Recommend Palliative care consult


-Further recommendations as per Dr. Alcides Nayak PGY1





- Date & Time


Date: 07/01/18


Time: 02:00

## 2018-07-01 NOTE — RAD
HISTORY:

Sepsis.



COMPARISON:

06/21/2018 



FINDINGS:



LUNGS:

No active pulmonary disease.



PLEURA:

No significant pleural effusion identified, no pneumothorax apparent.



CARDIOVASCULAR:

 No radiographic findings to suggest acute or significant 

cardiovascular disease. PICC line in satisfactory position 



OSSEOUS STRUCTURES:

No significant abnormalities.



VISUALIZED UPPER ABDOMEN:

Normal.



OTHER FINDINGS:

None.



IMPRESSION:

No active disease. No significant interval change compared to the 

prior examination(s).

## 2018-07-01 NOTE — CON
DATE:  07/01/2018



The patient is in bed.  Seen early this morning in room 270 bed 2.



CHIEF COMPLAINT:  Abdominal pain times several days.



HISTORY OF PRESENT ILLNESS:  This is a 59-year-old female with diabetes

mellitus; hypertension; obesity with a BMI of 37; ovarian cancer diagnosed

recently, had chemotherapy; history of left eye blindness; coronary artery

disease; anemia; history of Meniere disease and gallstones, who was

admitted with the diagnosis of abdominal pain and the patient states that

she has had nausea and vomiting and abdominal pain.  There has been

low-grade fevers.  No chills.  There is mild shortness of breath, slight

cough nonproductive.  No chest pain and no headaches and no rash, no knee

joint pain.



REVIEW OF SYSTEMS:  The 12-point review of systems performed.



PAST MEDICAL HISTORY:  Significant for ovarian cancer with status post

chemotherapy; diabetes mellitus; hypertension; obesity with BMI of 37;

coronary artery disease; left eye blindness; anemia; Meniere disease and

gallstones.



PAST SURGICAL HISTORY:  Significant for debulking surgery and hysterectomy.



ALLERGIES:  THE PATIENT IS ALLERGIC TO PENICILLIN, SHE DEVELOPS A RASH, SHE

STATES AND SHE IS ALSO ALLERGIC TO ACETAMINOPHEN, ASPIRIN, OXYCODONE.



MEDICATIONS AT HOME:  Include the patient to be on Zofran, insulin,

hydromorphone, ______Pepcid.



PHYSICAL EXAMINATION:

GENERAL:  On exam, the patient is in bed, appearing weak.

VITAL SIGNS:  Temperature of 97, heart rate of 130, blood pressure is

128/80 and respiratory rate of 22.  The patient is saturating at 100% nasal

cannula.

HEENT:  Examination is unremarkable.

NECK:  Supple.

LUNGS:  Have decreased breath sounds.

HEART:  Normal S1, S2.

ABDOMEN:  Soft, nontender.  Mild tenderness.  No rebound or guarding.



DATA:  Laboratory examination reveals the patient's white count of 22,300,

hemoglobin of 9, platelets of 359, 80% granulocytosis.  Coagulation is

noted and chemistries reveals a BUN of 36, creatinine of 2.1.  Random

glucose is 210, magnesium is 1.5.  Urinalysis is noted.  Urine protein is

100, bilirubin is small, trace leukocyte esterase, 2 to 5 wbc's.  CAT scan

is reviewed.  The patient had a CAT scan, it shows a questionable

collection and a small bowel obstruction.  Chest x-ray report is not

available.  The patient had an EKG, that report is not available.  

_____'s consultation is appreciated.



ASSESSMENT AND PLAN:  This is a 62-year-old female with diabetes;

hypertension; obesity, body mass index of 37; ovarian cancer, status post

chemotherapy; coronary artery disease; left eye blindness; anemia admitted

with tachycardia, nausea, vomiting, leukocytosis, dyspnea, PENICILLIN

ALLERGY with CT findings of small bowel obstruction and pulmonary nodules,

now in intra-abdominal collection and creatinine has changed from 0.6 to

2.1.





Severe sepsis with small bowel obstruction and possible intra-abdominal

collection in a patient with ovarian cancer, status post abdominal surgery,

now with pulmonary nodules and acute kidney injury.  Creatinine has changed

from 0.6 to 2.1 with metabolic acidosis with increased anion gap.  We will

treat the patient with Zyvox and meropenem and pending pan culture results

and overall response, should consider a hospice setting for this patient

who has ovarian cancer with pulmonary nodules and supportive care.







__________________________________________

Eugene Bishop MD



DD:  07/01/2018 10:09:24

DT:  07/01/2018 10:11:53

Job # [id number]

## 2018-07-01 NOTE — HP
HISTORY OF PRESENT ILLNESS:  Patient is 62 years old, states she was in a

rehab in [place].  She was not feeling well, developed abdominal pain,

had couple of episode of nausea.  Patient is not a very good historian. 

However; she was transferred from there for further evaluation.  She states

she has surgery done almost a month ago for ovarian cancer.  Has been on

chemotherapy.  She had debulking surgery done a month or two ago. 

Complained of decreased appetite, complained of vomiting.  Patient states

she has been increasingly getting weak, unable to participate in therapy,

and yesterday, she became diaphoretic and almost collapsed. so she was

brought to emergency room.



PAST MEDICAL HISTORY:  Significant for;

1.  Metastatic ovarian cancer with large pelvic mass.

2.  Hypertension.

3.  Meniere disease.

4.  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 She states she was in incubator,

leading to left eye blindness.

5.  History of cholelithiasis.

6.  Non-insulin-dependent diabetes.



SURGICAL HISTORY:  Significant for

1.  Debulking surgery.

2.  Hysterectomy 2 months ago in [facility].

3.  History of rotator cuff tear in 2010.



SOCIAL HISTORY:  She lives by herself.  Denies smoking, drinking, or

alcohol use.



ALLERGIES:  SHE IS ALLERGIC TO PENICILLIN, ASPIRIN, AND PERCOCET.



MEDICATION:  In the nursing home, she was on simethicone, ramipril 10 mg

daily, Zofran 4 mg every 4 hours, Duragesic patch, famotidine, and

albuterol.



REVIEW OF SYSTEMS:  Significant for nausea, abdominal discomfort, and

feeling weak.



PHYSICAL EXAMINATION:

GENERAL:  She is awake and alert.  She is legally blind in the left eye.

VITAL SIGNS:  She is afebrile, pulse 129, respirations 18, and blood

pressure 128/80.

LUNGS:  Bilateral diffusely decreased breath sounds.

HEART:  S1 and S2 audible.

ABDOMEN:  Soft, but distended.  She has dull aching throughout her abdomen,

but no rebound or guarding.

EXTREMITIES:  Bilateral legs, no edema.



LABORATORY DATA:  WBC 25.5, hemoglobin 8.6, hematocrit 26.4, and platelets

of 361.  Chemistry:  Sodium 135, potassium 5.6, chloride 102, CO2 of 17. 

BUN 38, creatinine 2.3.  Blood sugar of 227.  Urinalysis shows small

bilirubin, trace leukocyte.  She had CT scan of the abdomen and pelvis done

that shows mild progression of her disease with pulmonary nodules

suspicious for mets, interval development of peritoneal carcinomatosis,

multiple complex masses in the abdomen and pelvis consistent with

metastatic deposits with loculated fluids.



ASSESSMENT AND PLAN:

1.  Metastatic ovarian cancer.

2.  Acute renal failure.

3.  Hyperkalemia.

4.  Leukocytosis, rule out _____ .

5.  Acute-on-chronic renal failure.

6.  Non-insulin-dependent diabetes.



PLAN:  Currently, patient is n.p.o.  She is on IV fluid.  Analgesic as

needed.  She is on DVT prophylaxis.  We will monitor her blood sugar, give

her a dose of Kayexalate.  Follow up electrolytes in a.m.







__________________________________________

Parker Mitchell MD



DD:  07/01/2018 12:01:28

DT:  07/01/2018 14:19:30

Job # [id number]

## 2018-07-02 LAB
ALBUMIN SERPL-MCNC: 3 G/DL (ref 3–4.8)
ALBUMIN/GLOB SERPL: 1 {RATIO} (ref 1.1–1.8)
ALT SERPL-CCNC: 91 U/L (ref 7–56)
ANISOCYTOSIS BLD QL SMEAR: SLIGHT
AST SERPL-CCNC: 174 U/L (ref 14–36)
BASE EXCESS BLDV CALC-SCNC: -8.4 MMOL/L (ref 0–2)
BASE EXCESS BLDV CALC-SCNC: -9 MMOL/L (ref 0–2)
BASOPHILS # BLD AUTO: 0.03 K/MM3 (ref 0–2)
BASOPHILS NFR BLD: 0.1 % (ref 0–3)
BUN SERPL-MCNC: 49 MG/DL (ref 7–21)
CALCIUM SERPL-MCNC: 8.2 MG/DL (ref 8.4–10.5)
EOSINOPHIL # BLD: 0 10*3/UL (ref 0–0.7)
EOSINOPHIL NFR BLD: 0 % (ref 1.5–5)
ERYTHROCYTE [DISTWIDTH] IN BLOOD BY AUTOMATED COUNT: 17.3 % (ref 11.5–14.5)
GFR NON-AFRICAN AMERICAN: 14
GRANULOCYTES # BLD: 24.57 10*3/UL (ref 1.4–6.5)
GRANULOCYTES NFR BLD: 83.4 % (ref 50–68)
HGB BLD-MCNC: 7.1 G/DL (ref 12–16)
HYPOCHROMIA BLD QL SMEAR: SLIGHT
LG PLATELETS BLD QL SMEAR: PRESENT
LYMPHOCYTE: 3 % (ref 22–35)
LYMPHOCYTES # BLD: 1.1 10*3/UL (ref 1.2–3.4)
LYMPHOCYTES NFR BLD AUTO: 3.8 % (ref 22–35)
MCH RBC QN AUTO: 28.6 PG (ref 25–35)
MCHC RBC AUTO-ENTMCNC: 32.4 G/DL (ref 31–37)
MCV RBC AUTO: 88.3 FL (ref 80–105)
MICROCYTES BLD QL SMEAR: (no result)
MONOCYTE: 10 % (ref 1–6)
MONOCYTES # BLD AUTO: 3.8 10*3/UL (ref 0.1–0.6)
MONOCYTES NFR BLD: 12.7 % (ref 1–6)
NEUTROPHILS NFR BLD AUTO: 87 % (ref 50–70)
PH BLDV: 7.28 [PH] (ref 7.32–7.43)
PH BLDV: 7.3 [PH] (ref 7.32–7.43)
PLATELET # BLD EST: NORMAL 10*3/UL
PLATELET # BLD: 252 10^3/UL (ref 120–450)
PMV BLD AUTO: 9.3 FL (ref 7–11)
RBC # BLD AUTO: 2.48 10^6/UL (ref 3.5–6.1)
VENOUS BLOOD FIO2: 21 %
VENOUS BLOOD FIO2: 21 %
VENOUS BLOOD GAS PCO2: 35 (ref 40–60)
VENOUS BLOOD GAS PCO2: 36 (ref 40–60)
VENOUS BLOOD GAS PO2: 54 MM/HG (ref 30–55)
VENOUS BLOOD GAS PO2: 56 MM/HG (ref 30–55)
WBC # BLD AUTO: 29.5 10^3/UL (ref 4.5–11)

## 2018-07-02 RX ADMIN — LINEZOLID SCH MLS/HR: 600 INJECTION, SOLUTION INTRAVENOUS at 09:14

## 2018-07-02 RX ADMIN — INSULIN HUMAN SCH UNITS: 100 INJECTION, SOLUTION PARENTERAL at 17:57

## 2018-07-02 RX ADMIN — LINEZOLID SCH MLS/HR: 600 INJECTION, SOLUTION INTRAVENOUS at 22:18

## 2018-07-02 RX ADMIN — INSULIN HUMAN SCH UNITS: 100 INJECTION, SOLUTION PARENTERAL at 05:59

## 2018-07-02 RX ADMIN — INSULIN HUMAN SCH: 100 INJECTION, SOLUTION PARENTERAL at 00:58

## 2018-07-02 RX ADMIN — SIMETHICONE CHEW TAB 80 MG SCH MG: 80 TABLET ORAL at 17:58

## 2018-07-02 RX ADMIN — INSULIN HUMAN SCH UNITS: 100 INJECTION, SOLUTION PARENTERAL at 12:05

## 2018-07-02 RX ADMIN — SIMETHICONE CHEW TAB 80 MG SCH MG: 80 TABLET ORAL at 22:15

## 2018-07-02 RX ADMIN — METRONIDAZOLE SCH MLS/HR: 500 INJECTION, SOLUTION INTRAVENOUS at 22:17

## 2018-07-02 RX ADMIN — MEROPENEM SCH MLS/HR: 1 INJECTION INTRAVENOUS at 09:13

## 2018-07-02 RX ADMIN — SIMETHICONE CHEW TAB 80 MG SCH: 80 TABLET ORAL at 09:11

## 2018-07-02 RX ADMIN — INSULIN HUMAN SCH: 100 INJECTION, SOLUTION PARENTERAL at 17:49

## 2018-07-02 RX ADMIN — METRONIDAZOLE SCH MLS/HR: 500 INJECTION, SOLUTION INTRAVENOUS at 06:00

## 2018-07-02 RX ADMIN — SIMETHICONE CHEW TAB 80 MG SCH MG: 80 TABLET ORAL at 13:23

## 2018-07-02 RX ADMIN — METRONIDAZOLE SCH MLS/HR: 500 INJECTION, SOLUTION INTRAVENOUS at 13:23

## 2018-07-02 RX ADMIN — INSULIN HUMAN SCH UNITS: 100 INJECTION, SOLUTION PARENTERAL at 12:06

## 2018-07-02 RX ADMIN — MEROPENEM SCH MLS/HR: 1 INJECTION INTRAVENOUS at 22:16

## 2018-07-02 NOTE — RAD
HISTORY:

SBO, s/p NGT placement  



COMPARISON:

No prior.



FINDINGS:



BOWEL:

Normal. No obstruction. No free air. Nasogastric tube coiled in a 

decompressed stomach. 06/30/2018 CT abdomen and pelvis



BONES:

Normal.



OTHER FINDINGS:

None.



IMPRESSION:

Satisfactory position of recently placed nasogastric tube. No 

evidence of obstruction or free air on this single AP portable view 

at 00:08

## 2018-07-02 NOTE — CON
DATE:  07/02/2018



HISTORY OF PRESENT ILLNESS:  Ms. Adams is a 62-year-old female well

known to me from office.  She had recent debulking surgery at [facility]

with  _____.  She had extensive disease in the abdomen in

retroperitoneal area with large ovarian mass occupying the entire pelvis. 

Pathology is consistent with carcinosarcoma.  She was given one cycle of

chemotherapy with carboplatin and Taxol.  She had a complicated

postoperative course with massive ascites, pleural effusions, congestive

cardiac failure, acute renal failure.  She recovered fully postop and organ

functions were normal within two weeks after surgery.  She presented to the

hospital with acute renal failure, nausea, vomiting.  Complaining of

abdominal pain.  She vomited a few times in the night.  She is on NG tube

with suction.  NG drainage is serosanguineous.  She was on fentanyl patch

at the rehab and Dilaudid p.r.n.



PAST MEDICAL HISTORY:  Hypertension, blindness in left eye, Meniere

disease, diabetes mellitus type 2.



PAST SURGICAL HISTORY:  Orthopedic surgery, extensive debulking surgery for

malignant ovarian cancer.



FAMILY HISTORY:  Noncontributory.



PERSONAL HISTORY:  Never smoked.  No history of alcohol abuse.



ALLERGIES:  PENICILLIN, TYLENOL, ASPIRIN, OXYCODONE.



HOME MEDICATIONS:  Altace, Tylenol, albuterol, Pepcid, Dilaudid p.r.n.,

fentanyl patch, Zofran.



REVIEW OF SYSTEMS:  As per HPI, rest of 12-point review of systems is

reviewed and negative.  In addition, she is delusional, faint, "the nurse

trying to kill me."



PHYSICAL EXAMINATION:

GENERAL:  Comfortable in bed, in no acute distress.

VITAL SIGNS:  Temperature 98.7, heart rate 100 per minute, respiratory rate

20 per minute, blood pressure 140/70, pulse ox 100% on room air.

HEENT:  PERRLA, positive.

NECK:  No lymphadenopathy.

CHEST:  Air entry present and equal bilateral.  Crackles at bases.

ABDOMEN:  Distended, dense, nontender.

EXTREMITIES:  Bilateral edema.

NEUROLOGIC:  Delusional.  No focal sensorimotor deficit.

SKIN:  Pallor positive.

PSYCHIATRIC:  Alert, oriented, confused.



DATA:  CAT scan of the abdomen showed multiple masses with fluid filled

cavities, large collection in the pelvis, peritoneal carcinomatosis,

pulmonary nodules.  Laboratory data shows white count 32,000, hemoglobin

8.2, hematocrit 25.1, platelet count 283.  Sodium 133, potassium 5.5,

creatinine 3.3, , ALT 91.  Cultures:  Blood culture, no growth so

far.  Urine culture pending.



ASSESSMENT AND PLAN:

1.  Carcinosarcoma of the ovary.

2.  Extensive metastatic disease in the abdomen.

3.  Ascites.

4.  Acute renal failure.

5.  Severe anemia.

6.  Sepsis.



PLAN:  Stage IV carcinosarcoma of the ovary, extensive debulking surgery a

month ago.  Status post one cycle of chemotherapy with carboplatin/Taxol. 

Chemotherapy was given prior to final pathology.  She had second opinion on

the pathology, which was consistent with carcinosarcoma of the abdomen. 

Currently septic, on IV antibiotics as per ID, meropenem, Zyvox and Flagyl,

continue that.  Acute renal failure, likely related to extensive metastatic

disease in the abdomen, obstructive in nature or per se, acute renal

failure likely prerenal.  She is getting IV fluids, normal saline at 125 mL

in hour.  I would recommend two units of blood transfusion for hemoglobin

of 8.2.  Pain control with Dilaudid p.r.n. and discontinue the fentanyl

patch.  She is anuric.  Management as per Dr. Goldsmith.  Prognosis is poor.

We will contact the healthcare proxy and update the status.







__________________________________________

Dang Matthews MD



DD:  07/02/2018 7:54:23

DT:  07/02/2018 8:44:24

Job # [id number]

## 2018-07-02 NOTE — CP.PCM.PN
Subjective





- Date & Time of Evaluation


Date of Evaluation: 07/02/18


Time of Evaluation: 11:24





- Subjective


Subjective: 


General Surgery Note for Dr. Mcgrath


Pt seen and examined at bedside. Pt was agitated due to restraints placed 

overnight. Pt pulled NG tube overnight, and then it was replaced as per 

nursing. Reports discomfort for NG tube. Pt reports abdominal pain. Pt reports 

she passed gas this am. 





Objective





- Vital Signs/Intake and Output


Vital Signs (last 24 hours): 


 











Temp Pulse Resp BP Pulse Ox


 


 97.8 F   107 H  19   107/64   96 


 


 07/02/18 06:00  07/02/18 06:00  07/02/18 06:00  07/02/18 06:00  07/02/18 06:00








Intake and Output: 


 











 07/02/18 07/02/18





 06:59 18:59


 


Intake Total 1925 


 


Output Total 575 


 


Balance 1350 














- Medications


Medications: 


 Current Medications





Heparin Sodium (Porcine) (Heparin)  5,000 units SC Q8 ASNDY


   PRN Reason: Protocol


   Last Admin: 07/02/18 06:00 Dose:  5,000 units


Hydromorphone HCl (Dilaudid)  0.25 mg IVP Q3H PRN


   PRN Reason: Pain, severe (8-10)


Metronidazole (Flagyl)  500 mg in 100 mls @ 100 mls/hr IVPB Q8 SANDY


   PRN Reason: Protocol


   Last Admin: 07/02/18 06:00 Dose:  100 mls/hr


Meropenem (Merrem Iv 1 Gm Premix)  50 mls @ 100 mls/hr IVPB Q12 SANDY


   PRN Reason: Protocol


   Stop: 07/10/18 10:01


   Last Admin: 07/02/18 09:13 Dose:  100 mls/hr


Linezolid (Zyvox 600mg/300ml D5w)  600 mg in 300 mls @ 200 mls/hr IVPB Q12 SANDY


   PRN Reason: Protocol


   Stop: 07/10/18 10:16


   Last Admin: 07/02/18 09:14 Dose:  200 mls/hr


Dextrose/Sodium Chloride (Dextrose 5%/0.9% Ns 1000 Ml)  1,000 mls @ 75 mls/hr 

IV .[id number] SANDY


   Last Admin: 07/02/18 09:51 Dose:  75 mls/hr


Insulin Human Regular (Humulin R Med)  0 units SC Q6 SANDY


   PRN Reason: Protocol


   Last Admin: 07/02/18 05:59 Dose:  5 units


Insulin Human Regular (Humulin R)  5 units SC BID [facility]


Ipratropium Bromide (Atrovent)  0.5 mg IH Q3 PRN


   PRN Reason: Shortness of Breath


   Last Admin: 07/02/18 00:18 Dose:  0.5 mg


Levalbuterol HCl (Xopenex)  1.25 mg IH Q3 PRN


   PRN Reason: Shortness of Breath


   Last Admin: 07/02/18 00:18 Dose:  1.25 mg


Metoprolol Tartrate (Lopressor)  5 mg IVP Q6H PRN


   PRN Reason: heart rate >100


   Last Admin: 07/01/18 18:29 Dose:  5 mg


Ondansetron HCl (Zofran Inj)  8 mg IVP Q8H [facility]


   Last Admin: 07/02/18 08:34 Dose:  8 mg


Simethicone (Mylicon Chew Tab)  80 mg PO QID [facility]


   Last Admin: 07/02/18 09:11 Dose:  Not Given











- Labs


Labs: 


 





 07/02/18 06:20 





 07/02/18 06:20 





 











PT  12.6 SECONDS (9.4-12.5)  H  06/30/18  21:32    


 


INR  1.10  (0.93-1.08)  H  06/30/18  21:32    


 


APTT  24.1 Seconds (25.1-36.5)  L  06/30/18  21:32    














- Constitutional


Appears: Agitated





- Head Exam


Head Exam: NORMAL INSPECTION





- ENT Exam


ENT Exam: Mucous Membranes Dry





- Respiratory Exam


Respiratory Exam: NORMAL BREATHING PATTERN





- Cardiovascular Exam


Cardiovascular Exam: Tachycardia, REGULAR RHYTHM





- GI/Abdominal Exam


GI & Abdominal Exam: Distended, Soft, Tenderness, Diminished Bowel Sounds, Mass





- Extremities Exam


Extremities Exam: Normal Inspection





- Neurological Exam


Neurological Exam: Alert, Awake





- Psychiatric Exam


Psychiatric exam: Agitated





- Skin


Skin Exam: Dry, Intact, Warm





Assessment and Plan





- Assessment and Plan (Free Text)


Assessment: 





62 F with PMH of ovarian cancer on carboplatin/taxol 2 weeks ago, s/p debulking 

surgery 2 months ago presents for nausea/vomiting and abdominal pain; found to 

have 3 new pulmonary nodules;  peritoneal carcinomatosis; multiple complex 

masses in the abdomen and pelvis


Plan: 





-NPO


-Continue IV fluids


-Continue IV antibiotic


-Analgesics/Anti-emetics


-Strict I's & O's


-Serial abdominal exams


-Follow up palliative care consult


-Further recommendations as per Dr. Mcgrath

## 2018-07-02 NOTE — CP.PCM.PCO
Physician Communication Note





- Physician Communication Note


Physician Communication Note: Discussed end of life decisions with patient





Addendum


Addendum: 





07/02/18 00:03


Patient seen and examined at bedside after nurse reported multiple episodes of 

vomiting and worsened abdominal pain. Inserted an NGT at bedside with 300cc's 

of brown, feculent appearing fluid output immediately, patient tolerated the 

procedure well. Patient has low urine output and worsened renal function on lab 

work and worsening leukocytosis with persistent tachycardia and mild 

hypotension. Discussed end of life wishes with patient with nurse Navas at 

bedside and she stated that she does not wish to have CPR or to be intubated 

for cardiac or respiratory failure. Stated her medical surrogate decision maker 

is Selena Owen, a friend. Living will in the chart confirms Ms. Owen as medical 

decision maker. Called Ms. Owen at her home and confirmed over the phone that 

patient is DNR/DNI. Filled out a DNR/DNI consent form in her chart which Ms. Owen states she will sign in the AM. Spoke to patient again with nurse present 

as witness and patient confirmed DNR/DNI status

## 2018-07-02 NOTE — PN
DATE:  07/02/2018



SUBJECTIVE:  The patient is in bed, in no acute distress, was seen earlier

today in 270, bed 2.  No fevers and no chills.



PHYSICAL EXAMINATION:

VITAL SIGNS:  Temperature is 99, blood pressure is 100/60, respiratory rate

of 20, heart rate of 103.

HEENT:  Unremarkable.

NECK:  Supple.

LUNGS:  Have decreased breath sounds.

HEART:  Normal S1 and S2.

ABDOMEN:  Soft, nontender.



LABORATORY DATA:  Reveals a white count of 29,500, hemoglobin of 7.1,

platelets of 252.  Coagulation is noted.  BUN of 49, creatinine of 3.3,

glucose is 310.  Urinalysis is noted.  Microbiology reveals a gram-positive

cocci in the blood in 1 bottle.  I was not notified of this

coagulase-negative Staph by [facility].  The patient also has a gram-negative yves

in the urine culture.  Of note, this patient only had 2 to 5 wbc's in the

urine.  Review of orders reveals the patient to be on meropenem and Zyvox. 

Dr. Dr. Emeka Thibodeaux's progress note is reviewed.  Dr. Roldan De Santiago's

progress note is reviewed.  Rosario Freed's consultation is appreciated. 

Dr. Matthews's consultation is reviewed.



ASSESSMENT AND PLAN:  This is a 62-year-old female seen earlier today in

room 270, bed 2 with a nasogastric tube in with severe sepsis with small

bowel obstruction and possible intraabdominal collection in a patient with

ovarian cancer, status post chemotherapy, status post abdominal surgery,

now with pulmonary metastasis with pulmonary nodules and acute kidney

injury with metabolic acidosis and increased anion gap, now gram-positive

cocci, one bottle coag-negative Staphylococcus, most likely a

contamination, not a pathogen and with a gram-negative yves in the urine,

also the urinalysis is unremarkable.  We will give a short course of

antibiotic.  Overall prognosis is quite poor for this patient.  We will

repeat the blood cultures x2.  Dr. De Santiago has already ordered repeat blood

cultures.  We will follow those results.  Overall prognosis is poor.







__________________________________________

Eugene Bishop MD



DD:  07/02/2018 12:09:52

DT:  07/02/2018 12:43:35

Job # [id number]

## 2018-07-02 NOTE — CP.PCM.CON
History of Present Illness





- History of Present Illness


History of Present Illness: 





Palliative consult requested by Dr MARU Goldsmith


Reason: Goals of care





62 year old female with history of ovarian cancer s/p debulking surgery one 

month ago, currently receiving chemotherapy who was sent on 7/1/18 from [facility] 

[facility] with weakness, nausea, vomiting, abdominal discomfort, decreased 

appetite. She also had an episode of diaphoresis and almost collapsed. CT of 

abdomen showed marked disease progression, 3 new pulmonary nodules suspicious 

for metastasis, peritoneal carcinomatosis, multiple complex masses  in the 

abdomen and pelvis consistent with metastatic deposits, loculated fluid 

collection, possible early developing small bowel obstruction.





PMH: metastatic ovarian cancer, DM , HTN, cholelithiasis, left eye blindness, 

small bowel obstruction, Meniere's disease





PSH: hysterectomy with debulking surgery, rotator cuff tare





Social History: Never smoker, no alcohol or drug use. , lives by herself.





Family History: Non contributory.





Advance Care Planning: She does not have an Advanced Directive. She has 

appointed her friend, Selena Owen as her health care surrogate. 





Review of Systems: As per HPI, 12 point review otherwise negative. 








Labs:Wbc 29.5,Hgb 7.1, Plt 252,, K5.5, BUN 49, Creat 3.3, glucose 303, 

, ALT 91.


Vital Signs:T 97.8, P101, /70,R 19, O2 sat 96 on room air





Past Patient History





- Infectious Disease


Hx of Infectious Diseases: None





- Past Social History


Smoking Status: Never Smoked





- CARDIAC


Hx Cardiac Disorders: Yes


Hx Hypertension: Yes





- PULMONARY


Hx Respiratory Disorders: No





- NEUROLOGICAL


Hx Neurological Disorder: No





- HEENT


Hx HEENT Problems: Yes


Hx Blind: Yes (left eye)


Other/Comment: Meniere's Disease





- RENAL


Hx Chronic Kidney Disease: No





- ENDOCRINE/METABOLIC


Hx Diabetes Mellitus Type 2: Yes





- HEMATOLOGICAL/ONCOLOGICAL


Hx Blood Disorders: No





- INTEGUMENTARY


Hx Dermatological Problems: No





- MUSCULOSKELETAL/RHEUMATOLOGICAL


Hx Falls: No





- GASTROINTESTINAL


Hx Gastrointestinal Disorders: Yes


Other/Comment: Peritonitis





- GENITOURINARY/GYNECOLOGICAL


Hx Genitourinary Disorders: No





- PSYCHIATRIC


Hx Psychophysiologic Disorder: No


Hx Substance Use: No





- SURGICAL HISTORY


Hx Orthopedic Surgery: Yes (right rotator cuff)


Other/Comment: pelvic mass removed lap.





- ANESTHESIA


Hx Anesthesia: Yes


Hx Anesthesia Reactions: No


Hx Malignant Hyperthermia: No





Meds


Allergies/Adverse Reactions: 


 Allergies











Allergy/AdvReac Type Severity Reaction Status Date / Time


 


Penicillins Allergy  URTICARIA Verified 06/21/18 11:43


 


acetaminophen [From Percocet] AdvReac  DIZZINESS Verified 06/21/18 11:43


 


aspirin AdvReac  PAIN Verified 06/21/18 11:43


 


oxycodone AdvReac  DIZZINESS Verified 06/21/18 11:43














- Medications


Medications: 


 Current Medications





Heparin Sodium (Porcine) (Heparin)  5,000 units SC Q8 SANDY


   PRN Reason: Protocol


   Last Admin: 07/02/18 06:00 Dose:  5,000 units


Hydromorphone HCl (Dilaudid)  0.25 mg IVP Q3H PRN


   PRN Reason: Pain, severe (8-10)


Metronidazole (Flagyl)  500 mg in 100 mls @ 100 mls/hr IVPB Q8 SANDY


   PRN Reason: Protocol


   Last Admin: 07/02/18 06:00 Dose:  100 mls/hr


Meropenem (Merrem Iv 1 Gm Premix)  50 mls @ 100 mls/hr IVPB Q12 SANDY


   PRN Reason: Protocol


   Stop: 07/10/18 10:01


   Last Admin: 07/02/18 09:13 Dose:  100 mls/hr


Linezolid (Zyvox 600mg/300ml D5w)  600 mg in 300 mls @ 200 mls/hr IVPB Q12 SANDY


   PRN Reason: Protocol


   Stop: 07/10/18 10:16


   Last Admin: 07/02/18 09:14 Dose:  200 mls/hr


Dextrose/Sodium Chloride (Dextrose 5%/0.9% Ns 1000 Ml)  1,000 mls @ 75 mls/hr 

IV .[id number] [facility]


   Last Admin: 07/02/18 09:51 Dose:  75 mls/hr


Insulin Human Regular (Humulin R Med)  0 units SC Q6 SANDY


   PRN Reason: Protocol


   Last Admin: 07/02/18 05:59 Dose:  5 units


Insulin Human Regular (Humulin R)  5 units SC BID [facility]


Ipratropium Bromide (Atrovent)  0.5 mg IH Q3 PRN


   PRN Reason: Shortness of Breath


   Last Admin: 07/02/18 00:18 Dose:  0.5 mg


Levalbuterol HCl (Xopenex)  1.25 mg IH Q3 PRN


   PRN Reason: Shortness of Breath


   Last Admin: 07/02/18 00:18 Dose:  1.25 mg


Metoprolol Tartrate (Lopressor)  5 mg IVP Q6H PRN


   PRN Reason: heart rate >100


   Last Admin: 07/01/18 18:29 Dose:  5 mg


Ondansetron HCl (Zofran Inj)  8 mg IVP Q8H [facility]


   Last Admin: 07/02/18 08:34 Dose:  8 mg


Simethicone (Mylicon Chew Tab)  80 mg PO QID [facility]


   Last Admin: 07/02/18 09:11 Dose:  Not Given











Physical Exam





- Constitutional


Appears: Chronically Ill





- Head Exam


Head Exam: NORMAL INSPECTION





- Eye Exam


Additional comments: 





blind left eye 





- ENT Exam


ENT Exam: Mucous Membranes Moist, Normal Oropharynx





- Neck Exam


Neck exam: Positive for: Normal Inspection





- Respiratory Exam


Respiratory Exam: Decreased Breath Sounds, NORMAL BREATHING PATTERN





- Cardiovascular Exam


Cardiovascular Exam: REGULAR RHYTHM, +S1, +S2





- GI/Abdominal Exam


GI & Abdominal Exam: Distended


Additional comments: 





diffuse abdominal tenderness., NG tube draining green fluid





- Extremities Exam


Extremities exam: Positive for: pedal edema, pedal pulses present





- Back Exam


Back exam: NORMAL INSPECTION





- Neurological Exam


Neurological exam: Alert





- Psychiatric Exam


Psychiatric exam: Anxious





- Skin


Skin Exam: Dry, Pallor





- Additional Findings


Additional findings: 





palliative performance scale rating 30%





Results





- Vital Signs


Recent Vital Signs: 


 Last Vital Signs











Temp  97.8 F   07/02/18 06:00


 


Pulse  107 H  07/02/18 06:00


 


Resp  19   07/02/18 06:00


 


BP  107/64   07/02/18 06:00


 


Pulse Ox  96   07/02/18 06:00














- Labs


Result Diagrams: 


 07/02/18 06:20





 07/02/18 06:20


Labs: 


 Laboratory Results - last 24 hr











  07/01/18 07/01/18 07/01/18





  07:50 11:24 13:13


 


WBC   


 


RBC   


 


Hgb   


 


Hct   


 


MCV   


 


MCH   


 


MCHC   


 


RDW   


 


Plt Count   


 


MPV   


 


Gran %   


 


Lymph % (Auto)   


 


Mono % (Auto)   


 


Eos % (Auto)   


 


Baso % (Auto)   


 


Gran #   


 


Lymph # (Auto)   


 


Mono # (Auto)   


 


Eos # (Auto)   


 


Baso # (Auto)   


 


Neutrophils % (Manual)   


 


Lymphocytes % (Manual)   


 


Monocytes % (Manual)   


 


Platelet Evaluation   


 


Large Platelets   


 


Hypochromasia   


 


Anisocytosis (manual)   


 


Microcytosis (manual)   


 


pO2    32


 


VBG pH    7.30 L


 


VBG pCO2    33.0 L


 


VBG HCO3    16.2 L


 


VBG Total CO2    17.2 L


 


VBG O2 Sat (Calc)    66.8 H


 


VBG Base Excess    -9.2 L


 


VBG Potassium    5.6 H


 


Sodium    130.0 L


 


Chloride    102.0


 


Glucose    326 H


 


Lactate    3.1 H


 


FiO2    21.0


 


Potassium   


 


Carbon Dioxide   


 


Anion Gap   


 


BUN   


 


Creatinine   


 


Est GFR ( Amer)   


 


Est GFR (Non-Af Amer)   


 


POC Glucose (mg/dL)   227 H 


 


Random Glucose   


 


Hemoglobin A1c  6.0  


 


Calcium   


 


Total Bilirubin   


 


AST   


 


ALT   


 


Alkaline Phosphatase   


 


Troponin I   


 


Total Protein   


 


Albumin   


 


Globulin   


 


Albumin/Globulin Ratio   


 


Venous Blood Potassium    5.6 H














  07/01/18 07/01/18 07/01/18





  14:24 22:00 22:00


 


WBC   32.9 H* D 


 


RBC   2.84 L 


 


Hgb   8.2 L 


 


Hct   25.1 L 


 


MCV   88.4 


 


MCH   28.9 


 


MCHC   32.7 


 


RDW   17.5 H 


 


Plt Count   283 


 


MPV   9.1 


 


Gran %   


 


Lymph % (Auto)   


 


Mono % (Auto)   


 


Eos % (Auto)   


 


Baso % (Auto)   


 


Gran #   


 


Lymph # (Auto)   


 


Mono # (Auto)   


 


Eos # (Auto)   


 


Baso # (Auto)   


 


Neutrophils % (Manual)   


 


Lymphocytes % (Manual)   


 


Monocytes % (Manual)   


 


Platelet Evaluation   


 


Large Platelets   


 


Hypochromasia   


 


Anisocytosis (manual)   


 


Microcytosis (manual)   


 


pO2    35


 


VBG pH    7.30 L


 


VBG pCO2    33.0 L


 


VBG HCO3    16.2 L


 


VBG Total CO2    17.2 L


 


VBG O2 Sat (Calc)    70.5 H


 


VBG Base Excess    -9.2 L


 


VBG Potassium    5.8 H


 


Sodium    129.0 L


 


Chloride    101.0


 


Glucose    337 H


 


Lactate    3.6 H


 


FiO2    21.0


 


Potassium   


 


Carbon Dioxide   


 


Anion Gap   


 


BUN   


 


Creatinine   


 


Est GFR ( Amer)   


 


Est GFR (Non-Af Amer)   


 


POC Glucose (mg/dL)  266 H  


 


Random Glucose   


 


Hemoglobin A1c   


 


Calcium   


 


Total Bilirubin   


 


AST   


 


ALT   


 


Alkaline Phosphatase   


 


Troponin I   


 


Total Protein   


 


Albumin   


 


Globulin   


 


Albumin/Globulin Ratio   


 


Venous Blood Potassium    5.8 H














  07/01/18 07/02/18 07/02/18





  22:00 05:54 06:20


 


WBC    29.5 H*


 


RBC    2.48 L


 


Hgb    7.1 L


 


Hct    21.9 L


 


MCV    88.3


 


MCH    28.6


 


MCHC    32.4


 


RDW    17.3 H


 


Plt Count    252


 


MPV    9.3


 


Gran %    83.4 H


 


Lymph % (Auto)    3.8 L


 


Mono % (Auto)    12.7 H


 


Eos % (Auto)    0.0 L


 


Baso % (Auto)    0.1


 


Gran #    24.57 H


 


Lymph # (Auto)    1.1 L


 


Mono # (Auto)    3.8 H


 


Eos # (Auto)    0.0


 


Baso # (Auto)    0.03


 


Neutrophils % (Manual)    87 H


 


Lymphocytes % (Manual)    3 L


 


Monocytes % (Manual)    10 H


 


Platelet Evaluation    Normal


 


Large Platelets    Present


 


Hypochromasia    Slight


 


Anisocytosis (manual)    Slight


 


Microcytosis (manual)    1+


 


pO2   


 


VBG pH   


 


VBG pCO2   


 


VBG HCO3   


 


VBG Total CO2   


 


VBG O2 Sat (Calc)   


 


VBG Base Excess   


 


VBG Potassium   


 


Sodium  132  


 


Chloride  99  


 


Glucose   


 


Lactate   


 


FiO2   


 


Potassium  5.8 H*  


 


Carbon Dioxide  16 L  


 


Anion Gap  23 H  


 


BUN  47 H  


 


Creatinine  3.0 H  


 


Est GFR ( Amer)  19  


 


Est GFR (Non-Af Amer)  16  


 


POC Glucose (mg/dL)   273 H 


 


Random Glucose  313 H* D  


 


Hemoglobin A1c   


 


Calcium  8.4  


 


Total Bilirubin  0.5  


 


AST  132 H D  


 


ALT  65 H  


 


Alkaline Phosphatase  114  


 


Troponin I  < 0.01  


 


Total Protein  6.5  


 


Albumin  3.5  


 


Globulin  3.0  


 


Albumin/Globulin Ratio  1.1  


 


Venous Blood Potassium   














  07/02/18 07/02/18





  06:20 06:20


 


WBC  


 


RBC  


 


Hgb  


 


Hct  


 


MCV  


 


MCH  


 


MCHC  


 


RDW  


 


Plt Count  


 


MPV  


 


Gran %  


 


Lymph % (Auto)  


 


Mono % (Auto)  


 


Eos % (Auto)  


 


Baso % (Auto)  


 


Gran #  


 


Lymph # (Auto)  


 


Mono # (Auto)  


 


Eos # (Auto)  


 


Baso # (Auto)  


 


Neutrophils % (Manual)  


 


Lymphocytes % (Manual)  


 


Monocytes % (Manual)  


 


Platelet Evaluation  


 


Large Platelets  


 


Hypochromasia  


 


Anisocytosis (manual)  


 


Microcytosis (manual)  


 


pO2   54


 


VBG pH   7.28 L


 


VBG pCO2   36.0 L


 


VBG HCO3   16.9 L


 


VBG Total CO2   18.0 L


 


VBG O2 Sat (Calc)   91.6 H


 


VBG Base Excess   -9.0 L


 


VBG Potassium   5.5 H


 


Sodium  133  131.0 L


 


Chloride  100  101.0


 


Glucose   335 H


 


Lactate   2.5 H


 


FiO2   21.0


 


Potassium  5.5 H 


 


Carbon Dioxide  17 L 


 


Anion Gap  21 H 


 


BUN  49 H 


 


Creatinine  3.3 H 


 


Est GFR ( Amer)  17 


 


Est GFR (Non-Af Amer)  14 


 


POC Glucose (mg/dL)  


 


Random Glucose  310 H* 


 


Hemoglobin A1c  


 


Calcium  8.2 L 


 


Total Bilirubin  0.6 


 


AST  174 H D 


 


ALT  91 H 


 


Alkaline Phosphatase  106 


 


Troponin I  


 


Total Protein  6.0 


 


Albumin  3.0 


 


Globulin  3.0 


 


Albumin/Globulin Ratio  1.0 L 


 


Venous Blood Potassium   5.5 H














Assessment & Plan





- Assessment and Plan (Free Text)


Assessment: 





62 year old female with history of metastatic ovarian cancer s/p hysterectomy 

and debulking, HTN, DM small bowel obstruction who is admitted with abdominal 

pain, nausea, vomiting, small bowel obstruction, BRYAN on CKD, hyperkalemia and 

leukocytosis.





The patient is alert oriented to place and self. She is a poor historian. She 

is unable to verbalize reason for hospitalization. She complains of diffuse 

abdominal pain and occasional nausea. She has NG tube draining green fluid. 





The patient is known to me from previous admission. She affirms that she is DNR/

DNI and that her friend Inessa Owen is her health care surrogate. We discussed 

enacting POLST directive. Patent amenable, POLST: DNR/DNI completed, a copy is 

placed in the patients chart. Patient aware that kidney functions are declining

, preliminary discussion regarding goals of care and dialysis initiated. 

Patient not ready to discuss at this time. Reassured that palliative services 

will help provide support with decision making.





Time spent with patient in goals of care and advance care planning , 30 minutes


Plan: 





Goals of care and advance care planning; POLST:DNR/DNI





SBO: NG tube to suction, NPO, zofran as needed. being followed by surgery





Hyperkalemia: IVF's, Kaexalate





Leukocytosis: On Merrem,Flagyl and Zyvox,ID following.





Ascites: IR consulted for possible paracentisis.





BRYAN: IVF's, monitor labs





Pain: Dilaudid 0.25mg as needed

## 2018-07-02 NOTE — CP.PCM.PN
<Roldan De Santiago - Last Filed: 07/02/18 11:08>





Subjective





- Date & Time of Evaluation


Date of Evaluation: 07/02/18


Time of Evaluation: 10:42





- Subjective


Subjective: 


Medicine progress note for Dr. Goldsmith's service - SANDRA De Santiago PGY3





Patient seen and examined at bedside this morning. No acute overnight events or 

new complaints reported. Patient wished for wrist restraints to be removed. 

Extensive discussion was had regarding importance of keeping her NG tube in 

place due to nausea/vomiting overnight and patient was agreeable to avoid 

pulling out her NGT. She complained of general malaise. Denied chest pain, 

palpitations, SOB.





Objective





- Vital Signs/Intake and Output


Vital Signs (last 24 hours): 


 











Temp Pulse Resp BP Pulse Ox


 


 97.8 F   107 H  19   107/64   96 


 


 07/02/18 06:00  07/02/18 06:00  07/02/18 06:00  07/02/18 06:00  07/02/18 06:00








Intake and Output: 


 











 07/02/18 07/02/18





 06:59 18:59


 


Intake Total 1925 


 


Output Total 575 


 


Balance 1350 














- Medications


Medications: 


 Current Medications





Heparin Sodium (Porcine) (Heparin)  5,000 units SC Q8 [facility]


   PRN Reason: Protocol


   Last Admin: 07/02/18 06:00 Dose:  5,000 units


Hydromorphone HCl (Dilaudid)  0.25 mg IVP Q3H PRN


   PRN Reason: Pain, severe (8-10)


Metronidazole (Flagyl)  500 mg in 100 mls @ 100 mls/hr IVPB Q8 [facility]


   PRN Reason: Protocol


   Last Admin: 07/02/18 06:00 Dose:  100 mls/hr


Meropenem (Merrem Iv 1 Gm Premix)  50 mls @ 100 mls/hr IVPB Q12 S[facility]


   PRN Reason: Protocol


   Stop: 07/10/18 10:01


   Last Admin: 07/02/18 09:13 Dose:  100 mls/hr


Linezolid (Zyvox 600mg/300ml D5w)  600 mg in 300 mls @ 200 mls/hr IVPB Q12 S[facility]Y


   PRN Reason: Protocol


   Stop: 07/10/18 10:16


   Last Admin: 07/02/18 09:14 Dose:  200 mls/hr


Dextrose/Sodium Chloride (Dextrose 5%/0.9% Ns 1000 Ml)  1,000 mls @ 75 mls/hr 

IV .[id number] [facility]


   Last Admin: 07/02/18 09:51 Dose:  75 mls/hr


Insulin Human Regular (Humulin R Med)  0 units SC Q6 SANDY


   PRN Reason: Protocol


   Last Admin: 07/02/18 05:59 Dose:  5 units


Insulin Human Regular (Humulin R)  5 units SC BID [facility]


Ipratropium Bromide (Atrovent)  0.5 mg IH Q3 PRN


   PRN Reason: Shortness of Breath


   Last Admin: 07/02/18 00:18 Dose:  0.5 mg


Levalbuterol HCl (Xopenex)  1.25 mg IH Q3 PRN


   PRN Reason: Shortness of Breath


   Last Admin: 07/02/18 00:18 Dose:  1.25 mg


Metoprolol Tartrate (Lopressor)  5 mg IVP Q6H PRN


   PRN Reason: heart rate >100


   Last Admin: 07/01/18 18:29 Dose:  5 mg


Ondansetron HCl (Zofran Inj)  8 mg IVP Q8H [facility]


   Last Admin: 07/02/18 08:34 Dose:  8 mg


Simethicone (Mylicon Chew Tab)  80 mg PO QID [facility]


   Last Admin: 07/02/18 09:11 Dose:  Not Given











- Labs


Labs: 


 





 07/02/18 06:20 





 07/02/18 06:20 





 











PT  12.6 SECONDS (9.4-12.5)  H  06/30/18  21:32    


 


INR  1.10  (0.93-1.08)  H  06/30/18  21:32    


 


APTT  24.1 Seconds (25.1-36.5)  L  06/30/18  21:32    














- Constitutional


Appears: Older Than Stated Age, Chronically Ill





- Head Exam


Head Exam: ATRAUMATIC, NORMOCEPHALIC





- ENT Exam


ENT Exam: Mucous Membranes Moist





- Neck Exam


Neck Exam: Normal Inspection





- Respiratory Exam


Respiratory Exam: Decreased Breath Sounds.  absent: Rales, Rhonchi, Wheezes





- GI/Abdominal Exam


GI & Abdominal Exam: Distended, Firm.  absent: Guarding, Rebound


Additional comments: 


laparotomy scar


firm abdomen


no rebound/guarding











- Neurological Exam


Neurological Exam: Alert, Awake, Oriented x3





- Psychiatric Exam


Psychiatric exam: Anxious





- Skin


Skin Exam: Dry, Intact, Normal Color, Warm





Assessment and Plan





- Assessment and Plan (Free Text)


Plan: 


61yo female with extensive medical history including metastatic carcinosarcoma 

involving the ovary s/p debulking surgery one month prior recently on 

chemotherapy, HTN, DM2, cholelithiasis, hx of peritonitis, left eye blindness 

since birth [place] [facility] [place] associated with nausea

/vomiting, weakness and decreased appetite. 





1. Abdominal pain with nausea/vomiting


2. Metastatic carcinosarcoma involving the ovary s/p debulking surgery


3. Anemia


4. Acute kidney injury


5. Sepsis secondary UTI/bacteremia


6. Hyperkalemia


7. Hypertension


8. Diabetes mellitus type 2





-Abdominal/Pelvis CT was notable for marked disease progression, 3 new 

pulmonary nodules suspicious for metastasis, peritoneal carcinomatosis, 

multiple complex masses  in the abdomen and pelvis consistent with metastatic 

deposits, loculated fluid collection, possible early developing small bowel 

obstruction.


-PET CT from 5/2018 revealed large heterogenous mass in pelvis (sepation, cyst, 

necrotic) 13.4cm x 11.5cm x 9.6cm, Ascites in abdomen/pelvis, medium size R 

effusion, 9mm nodule L lower lobe


-Patient is on broad spectrum antibiotics with meropenem, aztreonam and flagyl; 

ID has been consulted and blood cultures were positive as well as urine cultures

; repeat cultures have been ordered and are pending


-For her BRYAN, patients ramapril has been discontinued and she was started on D5 

NS @ 75cc/hr; villagomez will be replaced and urine output will be monitored


-She was started on insulin scheduled and sliding scale for glycemic control


-IR was consulted for evaluation for paracentesis and surgery is following as 

well.


-As per patient's wishes, she is DNR/DNI and palliative care has been 

consulted. Discussed case with her healthcare proxy as well.








Patient seen and case discussed/reviewed with attending, Dr. Goldsmith





<Javan Goldsmith - Last Filed: 07/02/18 17:02>





Objective





- Vital Signs/Intake and Output


Vital Signs (last 24 hours): 


 











Temp Pulse Resp BP Pulse Ox


 


 98.8 F   114 H  18   94/51 L  100 


 


 07/02/18 12:54  07/02/18 12:54  07/02/18 12:54  07/02/18 12:54  07/02/18 12:54








Intake and Output: 


 











 07/02/18 07/02/18





 06:59 18:59


 


Intake Total 1925 575


 


Output Total 575 150


 


Balance 1350 425














- Medications


Medications: 


 Current Medications





Heparin Sodium (Porcine) (Heparin)  5,000 units SC Q8 [facility]


   PRN Reason: Protocol


   Last Admin: 07/02/18 13:23 Dose:  5,000 units


Hydromorphone HCl (Dilaudid)  0.25 mg IVP Q3H PRN


   PRN Reason: Pain, severe (8-10)


Metronidazole (Flagyl)  500 mg in 100 mls @ 100 mls/hr IVPB Q8 [facility]


   PRN Reason: Protocol


   Last Admin: 07/02/18 13:23 Dose:  100 mls/hr


Meropenem (Merrem Iv 1 Gm Premix)  50 mls @ 100 mls/hr IVPB Q12 [facility]


   PRN Reason: Protocol


   Stop: 07/10/18 10:01


   Last Admin: 07/02/18 09:13 Dose:  100 mls/hr


Linezolid (Zyvox 600mg/300ml D5w)  600 mg in 300 mls @ 200 mls/hr IVPB Q12 [facility]


   PRN Reason: Protocol


   Stop: 07/10/18 10:16


   Last Admin: 07/02/18 09:14 Dose:  200 mls/hr


Dextrose/Sodium Chloride (Dextrose 5%/0.9% Ns 1000 Ml)  1,000 mls @ 75 mls/hr 

IV .[id number] [facility]


   Last Admin: 07/02/18 09:51 Dose:  75 mls/hr


Insulin Human Regular (Humulin R Med)  0 units SC Q6 [facility]


   PRN Reason: Protocol


   Last Admin: 07/02/18 12:05 Dose:  5 units


Insulin Human Regular (Humulin R)  5 units SC BID [facility]


   Last Admin: 07/02/18 12:06 Dose:  5 units


Ipratropium Bromide (Atrovent)  0.5 mg IH Q3 PRN


   PRN Reason: Shortness of Breath


   Last Admin: 07/02/18 00:18 Dose:  0.5 mg


Levalbuterol HCl (Xopenex)  1.25 mg IH Q3 PRN


   PRN Reason: Shortness of Breath


   Last Admin: 07/02/18 00:18 Dose:  1.25 mg


Metoprolol Tartrate (Lopressor)  5 mg IVP Q6H PRN


   PRN Reason: heart rate >100


   Last Admin: 07/01/18 18:29 Dose:  5 mg


Ondansetron HCl (Zofran Inj)  8 mg IVP Q8H [facility]


   Last Admin: 07/02/18 08:34 Dose:  8 mg


Simethicone (Mylicon Chew Tab)  80 mg PO QID [facility]


   Last Admin: 07/02/18 13:23 Dose:  80 mg











- Labs


Labs: 


 





 07/02/18 06:20 





 07/02/18 06:20 





 











PT  12.6 SECONDS (9.4-12.5)  H  06/30/18  21:32    


 


INR  1.10  (0.93-1.08)  H  06/30/18  21:32    


 


APTT  24.1 Seconds (25.1-36.5)  L  06/30/18  21:32    














Assessment and Plan





- Assessment and Plan (Free Text)


Plan: 





Pt seen and examined. Agree with the note of the medical resident. Labs and 

medications have been reviewed. Pt with multiple acute issues. Pt has high K 

and will hold transfusion until improved. PRBC can increase K. Spoke to proxy 

and updated her on poor prognosis. Pt is anuric. Spoke to proxy about dialysis 

and she prefers comfort. Palliative care conult. Spoke to Myesha. Poor progosis. 

Pt is DNR

## 2018-07-03 ENCOUNTER — HOSPITAL ENCOUNTER (INPATIENT)
Dept: HOSPITAL 42 - 3RNO | Age: 63
DRG: 872 | End: 2018-07-03
Attending: INTERNAL MEDICINE | Admitting: INTERNAL MEDICINE
Payer: COMMERCIAL

## 2018-07-03 VITALS
RESPIRATION RATE: 17 BRPM | TEMPERATURE: 97.8 F | SYSTOLIC BLOOD PRESSURE: 98 MMHG | HEART RATE: 115 BPM | DIASTOLIC BLOOD PRESSURE: 52 MMHG | OXYGEN SATURATION: 98 %

## 2018-07-03 VITALS — RESPIRATION RATE: 18 BRPM

## 2018-07-03 VITALS — BODY MASS INDEX: 36.4 KG/M2

## 2018-07-03 DIAGNOSIS — A41.9: Primary | ICD-10-CM

## 2018-07-03 DIAGNOSIS — N39.0: ICD-10-CM

## 2018-07-03 DIAGNOSIS — N17.9: ICD-10-CM

## 2018-07-03 DIAGNOSIS — K56.609: ICD-10-CM

## 2018-07-03 DIAGNOSIS — C78.00: ICD-10-CM

## 2018-07-03 DIAGNOSIS — R65.20: ICD-10-CM

## 2018-07-03 DIAGNOSIS — Z66: ICD-10-CM

## 2018-07-03 DIAGNOSIS — C56.9: ICD-10-CM

## 2018-07-03 DIAGNOSIS — Z51.5: ICD-10-CM

## 2018-07-03 RX ADMIN — METRONIDAZOLE SCH MLS/HR: 500 INJECTION, SOLUTION INTRAVENOUS at 06:03

## 2018-07-03 NOTE — CP.PCM.PRO
Pronouncement of Death Note





- Clinical Findings


Physical Exam: No Response Verbal/Painful Stimuli, Absent Peripheral Pulses{

Carotid & Femoral}, Absent Heart & Breath Sounds, No Pupillary Light Reflex, No 

Corneal Reflex, Pupils Fixed & Dilated, Absence of Vital Signs





- Pronouncement Time


Time of Pronouncement of Death: 17:31





- Notifications


Pronouncement Notifications: Family Notified, Atending Notified


Medical Examiner Notified: Yes





- [place]


[place]: [id number]

## 2018-07-03 NOTE — CP.PCM.PN
Subjective





- Date & Time of Evaluation


Date of Evaluation: 07/03/18


Time of Evaluation: 07:13





- Subjective


Subjective: 


Medicine progress note for Dr. Goldsmith's service - SANDRA De Santiago PGY3





Patient seen and examined at bedside this morning. Reported an episode of 

vomiting overnight and NG tube is removed. Patient reportedly producing minimal 

urine. Palliative care has been consulted and patient is aware of poor 

prognosis. She reports abdominal pain on palpation. 





Objective





- Vital Signs/Intake and Output


Vital Signs (last 24 hours): 


 











Temp Pulse Resp BP Pulse Ox


 


 97.4 F L  108 H  19   84/48 L  99 


 


 07/02/18 18:33  07/02/18 18:33  07/02/18 18:33  07/02/18 18:33  07/02/18 18:33








Intake and Output: 


 











 07/03/18 07/03/18





 06:59 18:59


 


Intake Total 0 


 


Output Total 3 


 


Balance -3 














- Medications


Medications: 


 Current Medications





Heparin Sodium (Porcine) (Heparin)  5,000 units SC Q8 [facility]Y


   PRN Reason: Protocol


   Last Admin: 07/03/18 06:04 Dose:  5,000 units


Hydromorphone HCl (Dilaudid)  0.25 mg IVP Q3H PRN


   PRN Reason: Pain, severe (8-10)


   Last Admin: 07/03/18 02:24 Dose:  0.25 mg


Metronidazole (Flagyl)  500 mg in 100 mls @ 100 mls/hr IVPB Q8 [facility]


   PRN Reason: Protocol


   Last Admin: 07/03/18 06:03 Dose:  100 mls/hr


Meropenem (Merrem Iv 1 Gm Premix)  50 mls @ 100 mls/hr IVPB Q12 [facility]


   PRN Reason: Protocol


   Stop: 07/10/18 10:01


   Last Admin: 07/02/18 22:16 Dose:  100 mls/hr


Linezolid (Zyvox 600mg/300ml D5w)  600 mg in 300 mls @ 200 mls/hr IVPB Q12 [facility]


   PRN Reason: Protocol


   Stop: 07/10/18 10:16


   Last Admin: 07/02/18 22:18 Dose:  200 mls/hr


Dextrose/Sodium Chloride (Dextrose 5%/0.9% Ns 1000 Ml)  1,000 mls @ 75 mls/hr 

[id number]


   Last Admin: 07/02/18 09:51 Dose:  75 mls/hr


Insulin Human Regular (Humulin R Med)  0 units SC Q6 SANDY


   PRN Reason: Protocol


   Last Admin: 07/02/18 17:49 Dose:  Not Given


Insulin Human Regular (Humulin R)  5 units SC BID [facility]


   Last Admin: 07/02/18 17:57 Dose:  5 units


Ipratropium Bromide (Atrovent)  0.5 mg IH Q3 PRN


   PRN Reason: Shortness of Breath


   Last Admin: 07/02/18 00:18 Dose:  0.5 mg


Levalbuterol HCl (Xopenex)  1.25 mg IH Q3 PRN


   PRN Reason: Shortness of Breath


   Last Admin: 07/02/18 00:18 Dose:  1.25 mg


Metoprolol Tartrate (Lopressor)  5 mg IVP Q6H PRN


   PRN Reason: heart rate >100


   Last Admin: 07/01/18 18:29 Dose:  5 mg


Ondansetron HCl (Zofran Inj)  8 mg IVP Q8H [facility]


   Last Admin: 07/03/18 05:20 Dose:  Not Given


Simethicone (Mylicon Chew Tab)  80 mg PO QID [facility]


   Last Admin: 07/02/18 22:15 Dose:  80 mg











- Labs


Labs: 


 





 07/02/18 06:20 





 07/02/18 06:20 





 











PT  12.6 SECONDS (9.4-12.5)  H  06/30/18  21:32    


 


INR  1.10  (0.93-1.08)  H  06/30/18  21:32    


 


APTT  24.1 Seconds (25.1-36.5)  L  06/30/18  21:32    














- Constitutional


Appears: Confused, Chronically Ill





- Head Exam


Head Exam: ATRAUMATIC, NORMOCEPHALIC





- Neck Exam


Neck Exam: Normal Inspection





- Respiratory Exam


Respiratory Exam: Decreased Breath Sounds.  absent: Rales, Rhonchi, Wheezes





- Cardiovascular Exam


Cardiovascular Exam: +S1, +S2.  absent: Gallop, Rubs





- GI/Abdominal Exam


GI & Abdominal Exam: Distended, Firm, Tenderness.  absent: Guarding, Soft, 

Rebound





- Neurological Exam


Neurological Exam: Alert, Awake





- Psychiatric Exam


Psychiatric exam: Anxious





- Skin


Skin Exam: Dry, Intact, Normal Color, Warm





Assessment and Plan





- Assessment and Plan (Free Text)


Plan: 


61yo female with extensive medical history including metastatic carcinosarcoma 

involving the ovary s/p debulking surgery one month prior recently on 

chemotherapy, HTN, DM2, cholelithiasis, hx of peritonitis, left eye blindness 

since birth that presented to [facility] [place] with c/o abdominal pain associated with nausea

/vomiting, weakness and decreased appetite. 





1. Abdominal pain with nausea/vomiting


2. Metastatic carcinosarcoma involving the ovary s/p debulking surgery


3. Anemia


4. Acute kidney injury


5. Sepsis secondary UTI/bacteremia


6. Hyperkalemia


7. Hypertension


8. Diabetes mellitus type 2





-Patient is on broad spectrum antibiotics with meropenem, zyvox and flagyl; ID 

has been consulted and blood culture was positive for coag negative staph in 1 

bottle likely contamination, however repeat cultures have been ordered and are 

pending; Urine culture growing graph negative rods


-For her BRYAN, patients ramapril has been discontinued and she was started on D5 

NS @ 75cc/hr; Patient is anuric and discussion regarding dialysis was had with 

proxy and at this time prefers comfort care. 


-She was started on insulin scheduled and sliding scale for glycemic control


-IR was consulted for evaluation for paracentesis and surgery is following as 

well


-Prognosis is poor and as per patient's wishes, she is DNR/DNI. Palliative care 

has been consulted. Discussed case with her healthcare proxy as well.


-Abdominal/Pelvis CT was notable for marked disease progression, 3 new 

pulmonary nodules suspicious for metastasis, peritoneal carcinomatosis, 

multiple complex masses  in the abdomen and pelvis consistent with metastatic 

deposits, loculated fluid collection, possible early developing small bowel 

obstruction.


-PET CT from 5/2018 revealed large heterogenous mass in pelvis (sepation, cyst, 

necrotic) 13.4cm x 11.5cm x 9.6cm, Ascites in abdomen/pelvis, medium size R 

effusion, 9mm nodule L lower lobe





Patient seen and case discussed/reviewed with attending, Dr. Goldsmith

## 2018-07-03 NOTE — CP.PCM.PN
Subjective





- Date & Time of Evaluation


Date of Evaluation: 07/03/18


Time of Evaluation: 09:00





- Subjective


Subjective: 





Complains of persistent diffuse abdominal pain, intractable nausea and vomiting





Objective





- Vital Signs/Intake and Output


Vital Signs (last 24 hours): 


 











Temp Pulse Resp BP Pulse Ox


 


 97.8 F   115 H  17   98/52 L  98 


 


 07/03/18 08:40  07/03/18 08:40  07/03/18 08:40  07/03/18 08:40  07/03/18 08:40








Intake and Output: 


 











 07/03/18 07/03/18





 06:59 18:59


 


Intake Total 0 


 


Output Total 3 


 


Balance -3 














- Medications


Medications: 


 Current Medications





Heparin Sodium (Porcine) (Heparin)  5,000 units SC Q8 [facility]


   PRN Reason: Protocol


   Last Admin: 07/03/18 06:04 Dose:  5,000 units


Hydromorphone HCl (Dilaudid)  0.25 mg IVP Q3H PRN


   PRN Reason: Pain, severe (8-10)


   Last Admin: 07/03/18 02:24 Dose:  0.25 mg


Metronidazole (Flagyl)  500 mg in 100 mls @ 100 mls/hr IVPB Q8 [facility]


   PRN Reason: Protocol


   Last Admin: 07/03/18 06:03 Dose:  100 mls/hr


Meropenem (Merrem Iv 1 Gm Premix)  50 mls @ 100 mls/hr IVPB Q12 [facility]


   PRN Reason: Protocol


   Stop: 07/10/18 10:01


   Last Admin: 07/02/18 22:16 Dose:  100 mls/hr


Linezolid (Zyvox 600mg/300ml D5w)  600 mg in 300 mls @ 200 mls/hr IVPB Q12 [facility]


   PRN Reason: Protocol


   Stop: 07/10/18 10:16


   Last Admin: 07/02/18 22:18 Dose:  200 mls/hr


Dextrose/Sodium Chloride (Dextrose 5%/0.9% Ns 1000 Ml)  1,000 mls @ 75 mls/hr 

IV .[id number] [facility]


   Last Admin: 07/02/18 09:51 Dose:  75 mls/hr


Insulin Human Regular (Humulin R Med)  0 units SC Q6 SANDY


   PRN Reason: Protocol


   Last Admin: 07/02/18 17:49 Dose:  Not Given


Insulin Human Regular (Humulin R)  5 units SC BID [facility]


   Last Admin: 07/02/18 17:57 Dose:  5 units


Ipratropium Bromide (Atrovent)  0.5 mg IH Q3 PRN


   PRN Reason: Shortness of Breath


   Last Admin: 07/02/18 00:18 Dose:  0.5 mg


Levalbuterol HCl (Xopenex)  1.25 mg IH Q3 PRN


   PRN Reason: Shortness of Breath


   Last Admin: 07/02/18 00:18 Dose:  1.25 mg


Metoprolol Tartrate (Lopressor)  5 mg IVP Q6H PRN


   PRN Reason: heart rate >100


   Last Admin: 07/01/18 18:29 Dose:  5 mg


Ondansetron HCl (Zofran Inj)  8 mg IVP Q6 [facility]


Simethicone (Mylicon Chew Tab)  80 mg PO QID [facility]


   Last Admin: 07/02/18 22:15 Dose:  80 mg











- Labs


Labs: 


 





 07/02/18 06:20 





 07/02/18 06:20 





 











PT  12.6 SECONDS (9.4-12.5)  H  06/30/18  21:32    


 


INR  1.10  (0.93-1.08)  H  06/30/18  21:32    


 


APTT  24.1 Seconds (25.1-36.5)  L  06/30/18  21:32    














- Constitutional


Appears: Cachectic, Chronically Ill





- Eye Exam


Additional comments: 





blind left eye, right pupil EERL





- ENT Exam


ENT Exam: Mucous Membranes Moist, Normal Oropharynx





- Respiratory Exam


Respiratory Exam: Decreased Breath Sounds, NORMAL BREATHING PATTERN





- Cardiovascular Exam


Cardiovascular Exam: Tachycardia, +S1, +S2





- GI/Abdominal Exam


GI & Abdominal Exam: Soft, Tenderness, Diminished Bowel Sounds





- Extremities Exam


Extremities Exam: Normal Capillary Refill, Pedal Edema





- Neurological Exam


Neurological Exam: Alert





- Skin


Skin Exam: Dry, Pallor





Assessment and Plan





- Assessment and Plan (Free Text)


Assessment: 





62 year old female with history of advanced ovarian cancer, s/p debulking 

surgery and chemotherapy who is admitted with sepsis,small bowel obstruction, 

BRYAN, intractable pain, nausea and vomiting.





The patient had earlier discussion with Dr DORA Goldsmith regarding her medical 

condition and goals of care. Ms. Adams does not want dialysis,nor does she 

want NG tube. She is requesting comfort measures instead.  I also spoke with 

her and she reiterated her request for comfort care. Hospice services 

explained. Questions answered. Ms. Adams amenable to [facility] hospice services.





Patients POA, Selena Starkey notified of patients medical condion and intent to 

initiate hospice care. POA in agreement with this plan


Plan: 





Hospice evaluation for [facility] services





Pain management: Morphine IVP as needed, will switch to continuous Morphine 

infusion once on hospice





Management of nausea and vomiting, Zofran every 6 hours scheduled dosing. 





End of life care

## 2018-07-03 NOTE — PN
DATE:  07/03/2018



SUBJECTIVE:  Patient is seen early this morning, overall in poor condition

and continued to have abdominal pain; however, she did continue to look

poor.



PHYSICAL EXAMINATION:

VITAL SIGNS:  Temperature of 98, blood pressure is 90/60, respiratory rate

of 18, heart rate of 115.

HEENT:  Unremarkable.

NECK:  Supple.

LUNGS:  Have decreased breath sounds.

HEART:  Normal S1 and S2.

ABDOMEN:  Soft; however, mild tenderness.  No rebound or guarding.



ASSESSMENT AND PLAN:  A 62-year-old female who was seen early this morning

with severe sepsis with small bowel obstruction, questionable

intraabdominal collection with end-stage ovarian cancer with metastasis to

the lung.  Hospice setting.





__________________________________________

Eugene Bishop MD



DD:  07/03/2018 19:34:44

DT:  07/03/2018 21:35:53

Job # [id number]

## 2018-07-03 NOTE — CP.PCM.DIS
<Roldan De Santiago - Last Filed: 07/03/18 10:55>





Provider





- Provider


Date of Admission: 


07/01/18 03:56





Attending physician: 


Javan Goldsmith MD





Primary care physician: 


Javan Goldsmith MD





Consults: 


Surgery - Dr. Alcides CALDERÓN - Dr. Polk


Palliative care - [facility]


Oncology - Dr. Matthews


Time Spent in preparation of Discharge (in minutes): 40





Hospital Course





- Lab Results


Lab Results: 


 Micro Results





07/01/18 17:14   Urine,Clean Catch   Urine Culture - Final


                            ,000 CFU/ML.


                            MULTIPLE SPECIES. SUGGEST REPEAT SPECIMEN.





 Most Recent Lab Values











WBC  29.5 10^3/ul (4.5-11.0)  H*  07/02/18  06:20    


 


RBC  2.48 10^6/uL (3.5-6.1)  L  07/02/18  06:20    


 


Hgb  7.1 g/dL (12.0-16.0)  L  07/02/18  06:20    


 


Hct  21.9 % (36.0-48.0)  L  07/02/18  06:20    


 


MCV  88.3 fl (80.0-105.0)   07/02/18  06:20    


 


MCH  28.6 pg (25.0-35.0)   07/02/18  06:20    


 


MCHC  32.4 g/dl (31.0-37.0)   07/02/18  06:20    


 


RDW  17.3 % (11.5-14.5)  H  07/02/18  06:20    


 


Plt Count  252 10^3/uL (120.0-450.0)   07/02/18  06:20    


 


MPV  9.3 fl (7.0-11.0)   07/02/18  06:20    


 


Gran %  83.4 % (50.0-68.0)  H  07/02/18  06:20    


 


Lymph % (Auto)  3.8 % (22.0-35.0)  L  07/02/18  06:20    


 


Mono % (Auto)  12.7 % (1.0-6.0)  H  07/02/18  06:20    


 


Eos % (Auto)  0.0 % (1.5-5.0)  L  07/02/18  06:20    


 


Baso % (Auto)  0.1 % (0.0-3.0)   07/02/18  06:20    


 


Gran #  24.57  (1.4-6.5)  H  07/02/18  06:20    


 


Lymph # (Auto)  1.1  (1.2-3.4)  L  07/02/18  06:20    


 


Mono # (Auto)  3.8  (0.1-0.6)  H  07/02/18  06:20    


 


Eos # (Auto)  0.0  (0.0-0.7)   07/02/18  06:20    


 


Baso # (Auto)  0.03 K/mm3 (0.0-2.0)   07/02/18  06:20    


 


Neutrophils % (Manual)  87 % (50.0-70.0)  H  07/02/18  06:20    


 


Lymphocytes % (Manual)  3 % (22.0-35.0)  L  07/02/18  06:20    


 


Monocytes % (Manual)  10 % (1.0-6.0)  H  07/02/18  06:20    


 


Platelet Evaluation  Normal  (NORMAL)   07/02/18  06:20    


 


Large Platelets  Present   07/02/18  06:20    


 


Hypochromasia  Slight   07/02/18  06:20    


 


Anisocytosis (manual)  Slight   07/02/18  06:20    


 


Microcytosis (manual)  1+   07/02/18  06:20    


 


PT  12.6 SECONDS (9.4-12.5)  H  06/30/18  21:32    


 


INR  1.10  (0.93-1.08)  H  06/30/18  21:32    


 


APTT  24.1 Seconds (25.1-36.5)  L  06/30/18  21:32    


 


pCO2  26 mm/Hg (35-45)  L  07/01/18  04:45    


 


pO2  56 mm/Hg (30-55)  H  07/02/18  11:50    


 


HCO3  15.4 mmol/L (21-28)  L  07/01/18  04:45    


 


ABG pH  7.38  (7.35-7.45)   07/01/18  04:45    


 


ABG Total CO2  16.2 mmol.L (22-28)  L  07/01/18  04:45    


 


ABG O2 Saturation  99.2 % (95-98)  H  07/01/18  04:45    


 


ABG Base Excess  -8.0 mmol/L (-2.0-3.0)  L  07/01/18  04:45    


 


ABG Potassium  4.9 mmol/L (3.6-5.2)   07/01/18  04:45    


 


VBG pH  7.30  (7.32-7.43)  L  07/02/18  11:50    


 


VBG pCO2  35.0  (40-60)  L  07/02/18  11:50    


 


VBG HCO3  17.2 mmol/l (21-28)  L  07/02/18  11:50    


 


VBG Total CO2  18.3 mmol.L (22-28)  L  07/02/18  11:50    


 


VBG O2 Sat (Calc)  91.5 % (40-65)  H  07/02/18  11:50    


 


VBG Base Excess  -8.4 mmol/L (0.0-2.0)  L  07/02/18  11:50    


 


VBG Potassium  5.5 mmol/L (3.6-5.2)  H  07/02/18  11:50    


 


Sodium  130.0 mmol/L (132-148)  L  07/02/18  11:50    


 


Chloride  102.0 mmol/L ()   07/02/18  11:50    


 


Glucose  288 mg/dl ()  H  07/02/18  11:50    


 


Lactate  2.2 mmol/L (0.7-2.1)  H  07/02/18  11:50    


 


FiO2  21.0 %  07/02/18  11:50    


 


Sodium  133 mmol/L (132-148)   07/02/18  06:20    


 


Potassium  5.5 mmol/L (3.6-5.0)  H  07/02/18  06:20    


 


Chloride  100 mmol/L ()   07/02/18  06:20    


 


Carbon Dioxide  17 mmol/L (21-33)  L  07/02/18  06:20    


 


Anion Gap  21  (10-20)  H  07/02/18  06:20    


 


BUN  49 mg/dL (7-21)  H  07/02/18  06:20    


 


Creatinine  3.3 mg/dl (0.7-1.2)  H  07/02/18  06:20    


 


Est GFR ( Amer)  17   07/02/18  06:20    


 


Est GFR (Non-Af Amer)  14   07/02/18  06:20    


 


POC Glucose (mg/dL)  239 mg/dL ()  H  07/03/18  08:05    


 


Random Glucose  310 mg/dL ()  H*  07/02/18  06:20    


 


Hemoglobin A1c  6.0 % (4.2-6.5)   07/01/18  07:50    


 


Calcium  8.2 mg/dL (8.4-10.5)  L  07/02/18  06:20    


 


Phosphorus  4.3 mg/dL (2.5-4.5)   07/01/18  03:08    


 


Magnesium  1.5 mg/dL (1.7-2.2)  L  07/01/18  03:08    


 


Total Bilirubin  0.6 mg/dL (0.2-1.3)   07/02/18  06:20    


 


AST  174 U/L (14-36)  H D 07/02/18  06:20    


 


ALT  91 U/L (7-56)  H  07/02/18  06:20    


 


Alkaline Phosphatase  106 U/L ()   07/02/18  06:20    


 


Lactate Dehydrogenase  865 U/L (333-699)  H  07/01/18  07:50    


 


Total Creatine Kinase  20 U/L ()  L  07/01/18  07:50    


 


Troponin I  < 0.01 ng/mL  07/01/18  22:00    


 


NT-Pro-B Natriuret Pep  204 pg/mL (0-450)   07/01/18  03:08    


 


Total Protein  6.0 g/dL (5.8-8.3)   07/02/18  06:20    


 


Albumin  3.0 g/dL (3.0-4.8)   07/02/18  06:20    


 


Globulin  3.0 gm/dL  07/02/18  06:20    


 


Albumin/Globulin Ratio  1.0  (1.1-1.8)  L  07/02/18  06:20    


 


Arterial Blood Potassium  4.9 mmol/L (3.6-5.2)   07/01/18  04:45    


 


Venous Blood Potassium  5.5 mmol/L (3.6-5.2)  H  07/02/18  11:50    


 


Urine Color  Yellow  (YELLOW)   07/01/18  00:24    


 


Urine Appearance  Sl cloudy  (CLEAR)   07/01/18  00:24    


 


Urine pH  6.0  (4.7-8.0)   07/01/18  00:24    


 


Ur Specific Gravity  >= 1.030  (1.005-1.035)   07/01/18  00:24    


 


Urine Protein  100 mg/dL (<30 mg/dL)  H  07/01/18  00:24    


 


Urine Glucose (UA)  Negative mg/dL (NEGATIVE)   07/01/18  00:24    


 


Urine Ketones  15 mg/dL (NEGATIVE)  H  07/01/18  00:24    


 


Urine Blood  Trace-intact  (NEGATIVE)  H  07/01/18  00:24    


 


Urine Nitrate  Negative  (NEGATIVE)   07/01/18  00:24    


 


Urine Bilirubin  Small  (NEGATIVE)  H  07/01/18  00:24    


 


Urine Urobilinogen  1.0 E.U./dL (<1 E.U./dL)  H  07/01/18  00:24    


 


Ur Leukocyte Esterase  Trace Dennis/uL (NEGATIVE)  H  07/01/18  00:24    


 


Urine RBC  1 - 3 /hpf (0-2)   07/01/18  00:24    


 


Urine WBC  2 - 5 /hpf (0-6)   07/01/18  00:24    


 


Ur Epithelial Cells  1 - 3 /hpf (0-5)   07/01/18  00:24    


 


Urine Bacteria  Mod  (NEG)   07/01/18  00:24    


 


Emesis for Blood  Positive  (NEGATIVE)  H  07/02/18  16:33    


 


Blood Type  AB POSITIVE   07/01/18  03:28    


 


Antibody Screen  Negative   07/01/18  03:28    


 


Crossmatch  See Detail   07/01/18  03:28    


 


BBK History Checked  Patient has bt   07/01/18  03:28    














- Hospital Course


Hospital Course: 


Patient is a 63yo female with extensive past medical history including 

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 

prior recently on chemotherapy, hypertension, DM2, cholelithiasis, hx of 

peritonitis, left eye blindness since birth that had presented to [facility] with c/o 

abdominal pain associated with nausea/vomiting, weakness and decreased 

appetite. An abdominal/pelvis CT was obtained which revealed marked disease 

progression, 3 new pulmonary nodules suspicious for metastasis, peritoneal 

carcinomatosis, multiple complex masses  in the abdomen and pelvis consistent 

with metastatic deposits, loculated fluid collection, possible early developing 

small bowel obstruction. A PET scan from revealed large heterogenous mass in 

pelvis (sepation, cyst, necrotic) 13.4cm x 11.5cm x 9.6cm, Ascites in abdomen/

pelvis, medium size R effusion, 9mm nodule L lower lobe. She was noted to have 

marked leukocytosis, elevated lactic acid and tachycardia. She was started on 

IV fluids and broad spectrum antibiotics for severe sepsis presumably secondary 

to UTI which grew gram negative rods. Surgery was consulted for small bowel 

obstruction and an NG tube was placed which drained over 600cc of fluid. She 

was anuric and had an elevated creatinine secondary to acute kidney injury. Due 

to her poor prognosis and metastatic carcinosarcoma, palliative care was 

consulted and an extensive conversation was had with both the patient as well 

as her healthcare proxy. The patient and proxy both agreed that she wishes to 

be DNR/DNI and wants comfort measures. She did not want an NG tube or dialysis. 

Patient was subsequently discharged to inpatient hospice services. 





Discharge Exam





- Head Exam


Head Exam: ATRAUMATIC, NORMOCEPHALIC





- Respiratory Exam


Respiratory Exam: Decreased Breath Sounds.  absent: Rales, Rhonchi, Wheezes





- Cardiovascular Exam


Cardiovascular Exam: +S1, +S2.  absent: Gallop, Rubs





- GI/Abdominal Exam


GI & Abdominal Exam: Distended, Firm, Tenderness.  absent: Guarding, Rigid, Soft





- Extremities Exam


Extremities exam: normal inspection, pedal pulses present





- Neurological Exam


Neurological exam: Alert, Oriented x3





- Psychiatric Exam


Psychiatric exam: Normal Affect, Normal Mood





- Skin


Skin Exam: Dry, Intact, Normal Color, Warm





Discharge Plan





- Follow Up Plan


Condition: GUARDED


Disposition: HOSPICE - MEDICAL FACILITY


Referrals: 


Javan Goldsmith MD [Primary Care Provider] - 





<Javan Goldsmith - Last Filed: 07/04/18 10:44>





Provider





- Provider


Date of Admission: 


07/01/18 03:56





Attending physician: 


Javna Goldsmith MD





Primary care physician: 


Javan Goldsmith MD








Hospital Course





- Lab Results


Lab Results: 


 Micro Results





07/02/18 11:55   Blood   Blood Culture - Preliminary


                            NO GROWTH AFTER 24 HOURS


07/02/18 11:20   Blood   Blood Culture - Preliminary


                            NO GROWTH AFTER 24 HOURS


07/02/18 10:40   Blood   Blood Culture - Preliminary


                            NO GROWTH AFTER 24 HOURS


07/01/18 17:14   Urine,Clean Catch   Urine Culture - Final


                            ,000 CFU/ML.


                            MULTIPLE SPECIES. SUGGEST REPEAT SPECIMEN.





 Most Recent Lab Values











WBC  29.5 10^3/ul (4.5-11.0)  H*  07/02/18  06:20    


 


RBC  2.48 10^6/uL (3.5-6.1)  L  07/02/18  06:20    


 


Hgb  7.1 g/dL (12.0-16.0)  L  07/02/18  06:20    


 


Hct  21.9 % (36.0-48.0)  L  07/02/18  06:20    


 


MCV  88.3 fl (80.0-105.0)   07/02/18  06:20    


 


MCH  28.6 pg (25.0-35.0)   07/02/18  06:20    


 


MCHC  32.4 g/dl (31.0-37.0)   07/02/18  06:20    


 


RDW  17.3 % (11.5-14.5)  H  07/02/18  06:20    


 


Plt Count  252 10^3/uL (120.0-450.0)   07/02/18  06:20    


 


MPV  9.3 fl (7.0-11.0)   07/02/18  06:20    


 


Gran %  83.4 % (50.0-68.0)  H  07/02/18  06:20    


 


Lymph % (Auto)  3.8 % (22.0-35.0)  L  07/02/18  06:20    


 


Mono % (Auto)  12.7 % (1.0-6.0)  H  07/02/18  06:20    


 


Eos % (Auto)  0.0 % (1.5-5.0)  L  07/02/18  06:20    


 


Baso % (Auto)  0.1 % (0.0-3.0)   07/02/18  06:20    


 


Gran #  24.57  (1.4-6.5)  H  07/02/18  06:20    


 


Lymph # (Auto)  1.1  (1.2-3.4)  L  07/02/18  06:20    


 


Mono # (Auto)  3.8  (0.1-0.6)  H  07/02/18  06:20    


 


Eos # (Auto)  0.0  (0.0-0.7)   07/02/18  06:20    


 


Baso # (Auto)  0.03 K/mm3 (0.0-2.0)   07/02/18  06:20    


 


Neutrophils % (Manual)  87 % (50.0-70.0)  H  07/02/18  06:20    


 


Lymphocytes % (Manual)  3 % (22.0-35.0)  L  07/02/18  06:20    


 


Monocytes % (Manual)  10 % (1.0-6.0)  H  07/02/18  06:20    


 


Platelet Evaluation  Normal  (NORMAL)   07/02/18  06:20    


 


Large Platelets  Present   07/02/18  06:20    


 


Hypochromasia  Slight   07/02/18  06:20    


 


Anisocytosis (manual)  Slight   07/02/18  06:20    


 


Microcytosis (manual)  1+   07/02/18  06:20    


 


PT  12.6 SECONDS (9.4-12.5)  H  06/30/18  21:32    


 


INR  1.10  (0.93-1.08)  H  06/30/18  21:32    


 


APTT  24.1 Seconds (25.1-36.5)  L  06/30/18  21:32    


 


pCO2  26 mm/Hg (35-45)  L  07/01/18  04:45    


 


pO2  56 mm/Hg (30-55)  H  07/02/18  11:50    


 


HCO3  15.4 mmol/L (21-28)  L  07/01/18  04:45    


 


ABG pH  7.38  (7.35-7.45)   07/01/18  04:45    


 


ABG Total CO2  16.2 mmol.L (22-28)  L  07/01/18  04:45    


 


ABG O2 Saturation  99.2 % (95-98)  H  07/01/18  04:45    


 


ABG Base Excess  -8.0 mmol/L (-2.0-3.0)  L  07/01/18  04:45    


 


ABG Potassium  4.9 mmol/L (3.6-5.2)   07/01/18  04:45    


 


VBG pH  7.30  (7.32-7.43)  L  07/02/18  11:50    


 


VBG pCO2  35.0  (40-60)  L  07/02/18  11:50    


 


VBG HCO3  17.2 mmol/l (21-28)  L  07/02/18  11:50    


 


VBG Total CO2  18.3 mmol.L (22-28)  L  07/02/18  11:50    


 


VBG O2 Sat (Calc)  91.5 % (40-65)  H  07/02/18  11:50    


 


VBG Base Excess  -8.4 mmol/L (0.0-2.0)  L  07/02/18  11:50    


 


VBG Potassium  5.5 mmol/L (3.6-5.2)  H  07/02/18  11:50    


 


Sodium  130.0 mmol/L (132-148)  L  07/02/18  11:50    


 


Chloride  102.0 mmol/L ()   07/02/18  11:50    


 


Glucose  288 mg/dl ()  H  07/02/18  11:50    


 


Lactate  2.2 mmol/L (0.7-2.1)  H  07/02/18  11:50    


 


FiO2  21.0 %  07/02/18  11:50    


 


Sodium  133 mmol/L (132-148)   07/02/18  06:20    


 


Potassium  5.5 mmol/L (3.6-5.0)  H  07/02/18  06:20    


 


Chloride  100 mmol/L ()   07/02/18  06:20    


 


Carbon Dioxide  17 mmol/L (21-33)  L  07/02/18  06:20    


 


Anion Gap  21  (10-20)  H  07/02/18  06:20    


 


BUN  49 mg/dL (7-21)  H  07/02/18  06:20    


 


Creatinine  3.3 mg/dl (0.7-1.2)  H  07/02/18  06:20    


 


Est GFR ( Amer)  17   07/02/18  06:20    


 


Est GFR (Non-Af Amer)  14   07/02/18  06:20    


 


POC Glucose (mg/dL)  239 mg/dL ()  H  07/03/18  08:05    


 


Random Glucose  310 mg/dL ()  H*  07/02/18  06:20    


 


Hemoglobin A1c  6.0 % (4.2-6.5)   07/01/18  07:50    


 


Calcium  8.2 mg/dL (8.4-10.5)  L  07/02/18  06:20    


 


Phosphorus  4.3 mg/dL (2.5-4.5)   07/01/18  03:08    


 


Magnesium  1.5 mg/dL (1.7-2.2)  L  07/01/18  03:08    


 


Total Bilirubin  0.6 mg/dL (0.2-1.3)   07/02/18  06:20    


 


AST  174 U/L (14-36)  H D 07/02/18  06:20    


 


ALT  91 U/L (7-56)  H  07/02/18  06:20    


 


Alkaline Phosphatase  106 U/L ()   07/02/18  06:20    


 


Lactate Dehydrogenase  865 U/L (333-699)  H  07/01/18  07:50    


 


Total Creatine Kinase  20 U/L ()  L  07/01/18  07:50    


 


Troponin I  < 0.01 ng/mL  07/01/18  22:00    


 


NT-Pro-B Natriuret Pep  204 pg/mL (0-450)   07/01/18  03:08    


 


Total Protein  6.0 g/dL (5.8-8.3)   07/02/18  06:20    


 


Albumin  3.0 g/dL (3.0-4.8)   07/02/18  06:20    


 


Globulin  3.0 gm/dL  07/02/18  06:20    


 


Albumin/Globulin Ratio  1.0  (1.1-1.8)  L  07/02/18  06:20    


 


Arterial Blood Potassium  4.9 mmol/L (3.6-5.2)   07/01/18  04:45    


 


Venous Blood Potassium  5.5 mmol/L (3.6-5.2)  H  07/02/18  11:50    


 


Urine Color  Yellow  (YELLOW)   07/01/18  00:24    


 


Urine Appearance  Sl cloudy  (CLEAR)   07/01/18  00:24    


 


Urine pH  6.0  (4.7-8.0)   07/01/18  00:24    


 


Ur Specific Gravity  >= 1.030  (1.005-1.035)   07/01/18  00:24    


 


Urine Protein  100 mg/dL (<30 mg/dL)  H  07/01/18  00:24    


 


Urine Glucose (UA)  Negative mg/dL (NEGATIVE)   07/01/18  00:24    


 


Urine Ketones  15 mg/dL (NEGATIVE)  H  07/01/18  00:24    


 


Urine Blood  Trace-intact  (NEGATIVE)  H  07/01/18  00:24    


 


Urine Nitrate  Negative  (NEGATIVE)   07/01/18  00:24    


 


Urine Bilirubin  Small  (NEGATIVE)  H  07/01/18  00:24    


 


Urine Urobilinogen  1.0 E.U./dL (<1 E.U./dL)  H  07/01/18  00:24    


 


Ur Leukocyte Esterase  Trace Dennis/uL (NEGATIVE)  H  07/01/18  00:24    


 


Urine RBC  1 - 3 /hpf (0-2)   07/01/18  00:24    


 


Urine WBC  2 - 5 /hpf (0-6)   07/01/18  00:24    


 


Ur Epithelial Cells  1 - 3 /hpf (0-5)   07/01/18  00:24    


 


Urine Bacteria  Mod  (NEG)   07/01/18  00:24    


 


Emesis for Blood  Positive  (NEGATIVE)  H  07/02/18  16:33    


 


Blood Type  AB POSITIVE   07/01/18  03:28    


 


Antibody Screen  Negative   07/01/18  03:28    


 


Crossmatch  See Detail   07/01/18  03:28    


 


BBK History Checked  Patient has bt   07/01/18  03:28    














- Hospital Course


Hospital Course: 





Pt seen and examined yesterday. The labs and medications have been reviewed. 

She has a poor prognosis. She has agreed to comfort care. Will give Morphine 

and and Zofran. She is oligouric. Spoke to Palliative care and she has been 

accepted to hospice.